# Patient Record
Sex: FEMALE | Race: OTHER | Employment: UNEMPLOYED | URBAN - METROPOLITAN AREA
[De-identification: names, ages, dates, MRNs, and addresses within clinical notes are randomized per-mention and may not be internally consistent; named-entity substitution may affect disease eponyms.]

---

## 2019-01-14 ENCOUNTER — OFFICE VISIT (OUTPATIENT)
Dept: PRIMARY CARE CLINIC | Age: 54
End: 2019-01-14
Payer: COMMERCIAL

## 2019-01-14 VITALS
WEIGHT: 157.2 LBS | OXYGEN SATURATION: 98 % | HEIGHT: 60 IN | DIASTOLIC BLOOD PRESSURE: 82 MMHG | SYSTOLIC BLOOD PRESSURE: 138 MMHG | BODY MASS INDEX: 30.86 KG/M2 | HEART RATE: 61 BPM

## 2019-01-14 DIAGNOSIS — Z13.1 SCREENING FOR DIABETES MELLITUS: ICD-10-CM

## 2019-01-14 DIAGNOSIS — Z00.00 HEALTHCARE MAINTENANCE: Primary | ICD-10-CM

## 2019-01-14 DIAGNOSIS — Z12.31 SCREENING MAMMOGRAM, ENCOUNTER FOR: ICD-10-CM

## 2019-01-14 DIAGNOSIS — Z13.220 SCREENING FOR HYPERLIPIDEMIA: ICD-10-CM

## 2019-01-14 PROCEDURE — 99386 PREV VISIT NEW AGE 40-64: CPT | Performed by: FAMILY MEDICINE

## 2019-01-14 ASSESSMENT — PATIENT HEALTH QUESTIONNAIRE - PHQ9
1. LITTLE INTEREST OR PLEASURE IN DOING THINGS: 0
SUM OF ALL RESPONSES TO PHQ QUESTIONS 1-9: 0
SUM OF ALL RESPONSES TO PHQ9 QUESTIONS 1 & 2: 0
SUM OF ALL RESPONSES TO PHQ QUESTIONS 1-9: 0
2. FEELING DOWN, DEPRESSED OR HOPELESS: 0

## 2019-01-14 ASSESSMENT — ENCOUNTER SYMPTOMS
DIARRHEA: 0
NAUSEA: 0
VOMITING: 0
BLOOD IN STOOL: 0

## 2019-01-24 ENCOUNTER — HOSPITAL ENCOUNTER (OUTPATIENT)
Age: 54
Discharge: HOME | End: 2019-01-24
Payer: COMMERCIAL

## 2019-01-24 DIAGNOSIS — Z00.00 HEALTHCARE MAINTENANCE: ICD-10-CM

## 2019-01-24 DIAGNOSIS — Z13.1 SCREENING FOR DIABETES MELLITUS: ICD-10-CM

## 2019-01-24 DIAGNOSIS — Z13.220 SCREENING FOR HYPERLIPIDEMIA: ICD-10-CM

## 2019-01-24 LAB
ALBUMIN SERPL-MCNC: 4.6 G/DL (ref 3.5–5.2)
ALBUMIN/GLOBULIN RATIO: ABNORMAL (ref 1–2.5)
ALP BLD-CCNC: 73 U/L (ref 35–104)
ALT SERPL-CCNC: 27 U/L (ref 5–33)
ANION GAP SERPL CALCULATED.3IONS-SCNC: 11 MMOL/L (ref 9–17)
AST SERPL-CCNC: 21 U/L
BILIRUB SERPL-MCNC: 1.52 MG/DL (ref 0.3–1.2)
BUN BLDV-MCNC: 9 MG/DL (ref 6–20)
BUN/CREAT BLD: ABNORMAL (ref 9–20)
CALCIUM SERPL-MCNC: 9.9 MG/DL (ref 8.6–10.4)
CHLORIDE BLD-SCNC: 101 MMOL/L (ref 98–107)
CHOLESTEROL/HDL RATIO: 5.4
CHOLESTEROL: 207 MG/DL
CO2: 28 MMOL/L (ref 20–31)
CREAT SERPL-MCNC: 0.57 MG/DL (ref 0.5–0.9)
ESTIMATED AVERAGE GLUCOSE: 123 MG/DL
GFR AFRICAN AMERICAN: >60 ML/MIN
GFR NON-AFRICAN AMERICAN: >60 ML/MIN
GFR SERPL CREATININE-BSD FRML MDRD: ABNORMAL ML/MIN/{1.73_M2}
GFR SERPL CREATININE-BSD FRML MDRD: ABNORMAL ML/MIN/{1.73_M2}
GLUCOSE BLD-MCNC: 109 MG/DL (ref 70–99)
HBA1C MFR BLD: 5.9 % (ref 4–6)
HDLC SERPL-MCNC: 38 MG/DL
LDL CHOLESTEROL: 99 MG/DL (ref 0–130)
POTASSIUM SERPL-SCNC: 4.4 MMOL/L (ref 3.7–5.3)
SODIUM BLD-SCNC: 140 MMOL/L (ref 135–144)
TOTAL PROTEIN: 7.6 G/DL (ref 6.4–8.3)
TRIGL SERPL-MCNC: 349 MG/DL
VLDLC SERPL CALC-MCNC: ABNORMAL MG/DL (ref 1–30)

## 2019-01-24 PROCEDURE — 80053 COMPREHEN METABOLIC PANEL: CPT

## 2019-01-24 PROCEDURE — 80061 LIPID PANEL: CPT

## 2019-01-24 PROCEDURE — 36415 COLL VENOUS BLD VENIPUNCTURE: CPT

## 2019-01-24 PROCEDURE — 83036 HEMOGLOBIN GLYCOSYLATED A1C: CPT

## 2019-02-01 ENCOUNTER — OFFICE VISIT (OUTPATIENT)
Dept: PRIMARY CARE CLINIC | Age: 54
End: 2019-02-01
Payer: COMMERCIAL

## 2019-02-01 ENCOUNTER — HOSPITAL ENCOUNTER (OUTPATIENT)
Age: 54
Setting detail: SPECIMEN
Discharge: HOME | End: 2019-02-01

## 2019-02-01 VITALS
OXYGEN SATURATION: 97 % | WEIGHT: 158.8 LBS | SYSTOLIC BLOOD PRESSURE: 126 MMHG | BODY MASS INDEX: 31.18 KG/M2 | DIASTOLIC BLOOD PRESSURE: 70 MMHG | HEART RATE: 63 BPM | HEIGHT: 60 IN

## 2019-02-01 DIAGNOSIS — G47.00 INSOMNIA, UNSPECIFIED TYPE: ICD-10-CM

## 2019-02-01 DIAGNOSIS — R73.03 PRE-DIABETES: ICD-10-CM

## 2019-02-01 DIAGNOSIS — R17 ELEVATED BILIRUBIN: ICD-10-CM

## 2019-02-01 DIAGNOSIS — E78.1 HYPERTRIGLYCERIDEMIA: ICD-10-CM

## 2019-02-01 DIAGNOSIS — Z12.4 SCREENING FOR CERVICAL CANCER: Primary | ICD-10-CM

## 2019-02-01 PROCEDURE — 99214 OFFICE O/P EST MOD 30 MIN: CPT | Performed by: FAMILY MEDICINE

## 2019-02-01 ASSESSMENT — ENCOUNTER SYMPTOMS
VOMITING: 0
CONSTIPATION: 0
DIARRHEA: 0
SHORTNESS OF BREATH: 0
ABDOMINAL PAIN: 0
NAUSEA: 0

## 2019-02-01 ASSESSMENT — PATIENT HEALTH QUESTIONNAIRE - PHQ9
SUM OF ALL RESPONSES TO PHQ9 QUESTIONS 1 & 2: 0
SUM OF ALL RESPONSES TO PHQ QUESTIONS 1-9: 0
1. LITTLE INTEREST OR PLEASURE IN DOING THINGS: 0
2. FEELING DOWN, DEPRESSED OR HOPELESS: 0
SUM OF ALL RESPONSES TO PHQ QUESTIONS 1-9: 0

## 2019-02-04 LAB
HPV SAMPLE: ABNORMAL
HPV SOURCE: ABNORMAL
HPV, GENOTYPE 16: NOT DETECTED
HPV, GENOTYPE 18: NOT DETECTED
HPV, HIGH RISK OTHER: DETECTED
HPV, INTERPRETATION: ABNORMAL

## 2019-02-06 ENCOUNTER — HOSPITAL ENCOUNTER (OUTPATIENT)
Dept: WOMENS IMAGING | Age: 54
Discharge: HOME | End: 2019-02-08
Payer: COMMERCIAL

## 2019-02-06 DIAGNOSIS — Z12.31 SCREENING MAMMOGRAM, ENCOUNTER FOR: ICD-10-CM

## 2019-02-06 PROCEDURE — 77067 SCR MAMMO BI INCL CAD: CPT

## 2019-02-15 LAB — CYTOLOGY REPORT: NORMAL

## 2019-02-18 DIAGNOSIS — R87.610 ASCUS WITH POSITIVE HIGH RISK HPV CERVICAL: Primary | ICD-10-CM

## 2019-02-18 DIAGNOSIS — R87.810 ASCUS WITH POSITIVE HIGH RISK HPV CERVICAL: Primary | ICD-10-CM

## 2019-03-08 ENCOUNTER — OFFICE VISIT (OUTPATIENT)
Dept: OBGYN CLINIC | Age: 54
End: 2019-03-08
Payer: COMMERCIAL

## 2019-03-08 ENCOUNTER — HOSPITAL ENCOUNTER (OUTPATIENT)
Age: 54
Discharge: HOME | End: 2019-03-08
Payer: COMMERCIAL

## 2019-03-08 VITALS
HEART RATE: 53 BPM | BODY MASS INDEX: 30.66 KG/M2 | WEIGHT: 157 LBS | DIASTOLIC BLOOD PRESSURE: 87 MMHG | SYSTOLIC BLOOD PRESSURE: 141 MMHG

## 2019-03-08 DIAGNOSIS — R87.810 ASCUS WITH POSITIVE HIGH RISK HPV CERVICAL: Primary | ICD-10-CM

## 2019-03-08 DIAGNOSIS — R87.610 ASCUS WITH POSITIVE HIGH RISK HPV CERVICAL: Primary | ICD-10-CM

## 2019-03-08 DIAGNOSIS — R17 ELEVATED BILIRUBIN: ICD-10-CM

## 2019-03-08 LAB
ALBUMIN SERPL-MCNC: 4.8 G/DL (ref 3.5–5.2)
ALBUMIN/GLOBULIN RATIO: ABNORMAL (ref 1–2.5)
ALP BLD-CCNC: 78 U/L (ref 35–104)
ALT SERPL-CCNC: 32 U/L (ref 5–33)
ANION GAP SERPL CALCULATED.3IONS-SCNC: 11 MMOL/L (ref 9–17)
AST SERPL-CCNC: 28 U/L
BILIRUB SERPL-MCNC: 1.75 MG/DL (ref 0.3–1.2)
BILIRUBIN DIRECT: 0.22 MG/DL
BUN BLDV-MCNC: 10 MG/DL (ref 6–20)
BUN/CREAT BLD: ABNORMAL (ref 9–20)
CALCIUM SERPL-MCNC: 9.9 MG/DL (ref 8.6–10.4)
CHLORIDE BLD-SCNC: 101 MMOL/L (ref 98–107)
CO2: 28 MMOL/L (ref 20–31)
CREAT SERPL-MCNC: 0.57 MG/DL (ref 0.5–0.9)
GFR AFRICAN AMERICAN: >60 ML/MIN
GFR NON-AFRICAN AMERICAN: >60 ML/MIN
GFR SERPL CREATININE-BSD FRML MDRD: ABNORMAL ML/MIN/{1.73_M2}
GFR SERPL CREATININE-BSD FRML MDRD: ABNORMAL ML/MIN/{1.73_M2}
GLUCOSE BLD-MCNC: 106 MG/DL (ref 70–99)
POTASSIUM SERPL-SCNC: 4.4 MMOL/L (ref 3.7–5.3)
SODIUM BLD-SCNC: 140 MMOL/L (ref 135–144)
TOTAL PROTEIN: 7.9 G/DL (ref 6.4–8.3)

## 2019-03-08 PROCEDURE — 36415 COLL VENOUS BLD VENIPUNCTURE: CPT

## 2019-03-08 PROCEDURE — 80053 COMPREHEN METABOLIC PANEL: CPT

## 2019-03-08 PROCEDURE — 57452 EXAM OF CERVIX W/SCOPE: CPT | Performed by: SPECIALIST

## 2019-03-08 PROCEDURE — 82248 BILIRUBIN DIRECT: CPT

## 2019-03-08 ASSESSMENT — ENCOUNTER SYMPTOMS
DIARRHEA: 0
EYE PAIN: 0
ABDOMINAL DISTENTION: 0
COUGH: 0
VOMITING: 0
APNEA: 0
NAUSEA: 0
ABDOMINAL PAIN: 0
CONSTIPATION: 0

## 2019-03-11 ENCOUNTER — TELEPHONE (OUTPATIENT)
Dept: PRIMARY CARE CLINIC | Age: 54
End: 2019-03-11

## 2019-03-22 ENCOUNTER — OFFICE VISIT (OUTPATIENT)
Dept: PRIMARY CARE CLINIC | Age: 54
End: 2019-03-22
Payer: COMMERCIAL

## 2019-03-22 VITALS
OXYGEN SATURATION: 97 % | WEIGHT: 156.97 LBS | DIASTOLIC BLOOD PRESSURE: 78 MMHG | SYSTOLIC BLOOD PRESSURE: 128 MMHG | BODY MASS INDEX: 30.82 KG/M2 | HEIGHT: 60 IN | RESPIRATION RATE: 16 BRPM | HEART RATE: 62 BPM

## 2019-03-22 DIAGNOSIS — D64.9 ANEMIA, UNSPECIFIED TYPE: ICD-10-CM

## 2019-03-22 DIAGNOSIS — E80.6 INDIRECT HYPERBILIRUBINEMIA: Primary | ICD-10-CM

## 2019-03-22 DIAGNOSIS — H10.9 CONJUNCTIVITIS OF BOTH EYES, UNSPECIFIED CONJUNCTIVITIS TYPE: ICD-10-CM

## 2019-03-22 PROCEDURE — 3017F COLORECTAL CA SCREEN DOC REV: CPT | Performed by: FAMILY MEDICINE

## 2019-03-22 PROCEDURE — 99213 OFFICE O/P EST LOW 20 MIN: CPT | Performed by: FAMILY MEDICINE

## 2019-03-22 PROCEDURE — 1036F TOBACCO NON-USER: CPT | Performed by: FAMILY MEDICINE

## 2019-03-22 PROCEDURE — G8427 DOCREV CUR MEDS BY ELIG CLIN: HCPCS | Performed by: FAMILY MEDICINE

## 2019-03-22 PROCEDURE — G8417 CALC BMI ABV UP PARAM F/U: HCPCS | Performed by: FAMILY MEDICINE

## 2019-03-22 PROCEDURE — G8484 FLU IMMUNIZE NO ADMIN: HCPCS | Performed by: FAMILY MEDICINE

## 2019-03-22 RX ORDER — POLYMYXIN B SULFATE AND TRIMETHOPRIM 1; 10000 MG/ML; [USP'U]/ML
1 SOLUTION OPHTHALMIC EVERY 6 HOURS
Qty: 1 BOTTLE | Refills: 0 | Status: SHIPPED | OUTPATIENT
Start: 2019-03-22 | End: 2019-03-29

## 2019-03-22 ASSESSMENT — ENCOUNTER SYMPTOMS
EYE ITCHING: 1
SHORTNESS OF BREATH: 0
CONSTIPATION: 0
EYE PAIN: 0
COUGH: 0
DIARRHEA: 0
SORE THROAT: 0
RHINORRHEA: 0
VOMITING: 0
ABDOMINAL PAIN: 0
EYE DISCHARGE: 1
NAUSEA: 0

## 2019-04-04 ENCOUNTER — TELEPHONE (OUTPATIENT)
Dept: PRIMARY CARE CLINIC | Age: 54
End: 2019-04-04

## 2019-04-04 NOTE — TELEPHONE ENCOUNTER
Patient's daughter Annabelle Quiroga ([phone]) called stating that Patient came in to see Dr Lobito Barber on 3/22/19 for dry eyes, and a script for eye drops was sent in. The eye drops did not help, and actually made her eyes worse and says it actually feels like \"sand in her eyes\"    She is hoping something else can be called in for her instead of having to come in again.

## 2019-04-22 ENCOUNTER — TELEPHONE (OUTPATIENT)
Dept: PRIMARY CARE CLINIC | Age: 54
End: 2019-04-22

## 2019-05-17 ENCOUNTER — HOSPITAL ENCOUNTER (OUTPATIENT)
Age: 54
Discharge: HOME | End: 2019-05-17
Payer: COMMERCIAL

## 2019-05-17 DIAGNOSIS — D64.9 ANEMIA, UNSPECIFIED TYPE: ICD-10-CM

## 2019-05-17 DIAGNOSIS — E80.6 INDIRECT HYPERBILIRUBINEMIA: ICD-10-CM

## 2019-05-17 LAB
ABSOLUTE EOS #: 0.2 K/UL (ref 0–0.4)
ABSOLUTE IMMATURE GRANULOCYTE: NORMAL K/UL (ref 0–0.3)
ABSOLUTE LYMPH #: 2 K/UL (ref 1–4.8)
ABSOLUTE MONO #: 0.3 K/UL (ref 0.1–1.3)
ABSOLUTE RETIC #: 0.06 M/UL (ref 0.02–0.1)
BASOPHILS # BLD: 1 % (ref 0–2)
BASOPHILS ABSOLUTE: 0.1 K/UL (ref 0–0.2)
DIFFERENTIAL TYPE: NORMAL
EOSINOPHILS RELATIVE PERCENT: 4 % (ref 0–4)
HAPTOGLOBIN: 134 MG/DL (ref 30–200)
HCT VFR BLD CALC: 41.2 % (ref 36–46)
HEMOGLOBIN: 14 G/DL (ref 12–16)
IMMATURE GRANULOCYTES: NORMAL %
IMMATURE RETIC FRACT: NORMAL %
LACTATE DEHYDROGENASE: 185 U/L (ref 135–214)
LYMPHOCYTES # BLD: 34 % (ref 24–44)
MCH RBC QN AUTO: 28.1 PG (ref 26–34)
MCHC RBC AUTO-ENTMCNC: 34 G/DL (ref 31–37)
MCV RBC AUTO: 82.6 FL (ref 80–100)
MONOCYTES # BLD: 6 % (ref 1–7)
NRBC AUTOMATED: NORMAL PER 100 WBC
PDW BLD-RTO: 13.5 % (ref 11.5–14.9)
PLATELET # BLD: 261 K/UL (ref 150–450)
PLATELET ESTIMATE: NORMAL
PMV BLD AUTO: 7.7 FL (ref 6–12)
RBC # BLD: 4.98 M/UL (ref 4–5.2)
RBC # BLD: NORMAL 10*6/UL
RETIC %: 1.3 % (ref 0.5–2)
RETIC HEMOGLOBIN: NORMAL PG (ref 28.2–35.7)
SEG NEUTROPHILS: 55 % (ref 36–66)
SEGMENTED NEUTROPHILS ABSOLUTE COUNT: 3.4 K/UL (ref 1.3–9.1)
WBC # BLD: 6 K/UL (ref 3.5–11)
WBC # BLD: NORMAL 10*3/UL

## 2019-05-17 PROCEDURE — 85025 COMPLETE CBC W/AUTO DIFF WBC: CPT

## 2019-05-17 PROCEDURE — 85045 AUTOMATED RETICULOCYTE COUNT: CPT

## 2019-05-17 PROCEDURE — 83615 LACTATE (LD) (LDH) ENZYME: CPT

## 2019-05-17 PROCEDURE — 36415 COLL VENOUS BLD VENIPUNCTURE: CPT

## 2019-05-17 PROCEDURE — 83010 ASSAY OF HAPTOGLOBIN QUANT: CPT

## 2019-07-18 ENCOUNTER — TELEPHONE (OUTPATIENT)
Dept: PRIMARY CARE CLINIC | Age: 54
End: 2019-07-18

## 2019-09-20 ENCOUNTER — OFFICE VISIT (OUTPATIENT)
Dept: PRIMARY CARE CLINIC | Age: 54
End: 2019-09-20
Payer: COMMERCIAL

## 2019-09-20 VITALS
DIASTOLIC BLOOD PRESSURE: 84 MMHG | RESPIRATION RATE: 18 BRPM | WEIGHT: 156.97 LBS | BODY MASS INDEX: 30.82 KG/M2 | HEIGHT: 60 IN | SYSTOLIC BLOOD PRESSURE: 120 MMHG

## 2019-09-20 DIAGNOSIS — H04.123 DRY EYES: ICD-10-CM

## 2019-09-20 DIAGNOSIS — E80.6 INDIRECT HYPERBILIRUBINEMIA: ICD-10-CM

## 2019-09-20 DIAGNOSIS — R87.810 ASCUS WITH POSITIVE HIGH RISK HPV CERVICAL: ICD-10-CM

## 2019-09-20 DIAGNOSIS — Z12.11 SCREENING FOR COLON CANCER: ICD-10-CM

## 2019-09-20 DIAGNOSIS — R10.31 RIGHT LOWER QUADRANT ABDOMINAL PAIN: ICD-10-CM

## 2019-09-20 DIAGNOSIS — R87.610 ASCUS WITH POSITIVE HIGH RISK HPV CERVICAL: ICD-10-CM

## 2019-09-20 PROCEDURE — 3017F COLORECTAL CA SCREEN DOC REV: CPT | Performed by: FAMILY MEDICINE

## 2019-09-20 PROCEDURE — G8427 DOCREV CUR MEDS BY ELIG CLIN: HCPCS | Performed by: FAMILY MEDICINE

## 2019-09-20 PROCEDURE — 1036F TOBACCO NON-USER: CPT | Performed by: FAMILY MEDICINE

## 2019-09-20 PROCEDURE — 99214 OFFICE O/P EST MOD 30 MIN: CPT | Performed by: FAMILY MEDICINE

## 2019-09-20 PROCEDURE — G8417 CALC BMI ABV UP PARAM F/U: HCPCS | Performed by: FAMILY MEDICINE

## 2019-09-20 NOTE — PROGRESS NOTES
medications for this visit. No Known Allergies    Health Maintenance   Topic Date Due    Hepatitis C screen  1965    HIV screen  02/12/1980    DTaP/Tdap/Td vaccine (1 - Tdap) 02/12/1984    Shingles Vaccine (1 of 2) 02/12/2015    Colon cancer screen colonoscopy  02/12/2015    Flu vaccine (1) 09/01/2019    A1C test (Diabetic or Prediabetic)  01/24/2020    Breast cancer screen  02/06/2021    Lipid screen  01/24/2024    Cervical cancer screen  02/01/2024    Pneumococcal 0-64 years Vaccine  Aged Out       Subjective:      Review of Systems   Constitutional: Negative for chills and fever. Eyes:        Dry eyes   Respiratory: Negative for shortness of breath. Gastrointestinal: Negative for blood in stool, constipation, diarrhea, nausea and vomiting. Once in a while gets mild RLQ/ pelvic discomfort   Genitourinary: Negative for dysuria and vaginal bleeding. Objective:     Physical Exam   Constitutional: She appears well-developed and well-nourished. No distress. HENT:   Head: Normocephalic and atraumatic. Mouth/Throat: Oropharynx is clear and moist. No oropharyngeal exudate. Eyes: Pupils are equal, round, and reactive to light. Right eye exhibits no discharge. Left eye exhibits no discharge. No scleral icterus. Neck: Neck supple. Cardiovascular: Normal rate, regular rhythm and normal heart sounds. No murmur heard. Pulmonary/Chest: Effort normal and breath sounds normal. No stridor. No respiratory distress. Abdominal: Soft. Bowel sounds are normal. She exhibits no distension. There is no tenderness. Neurological: She is alert. Skin: Skin is warm and dry. She is not diaphoretic. Psychiatric: She has a normal mood and affect. Her behavior is normal.     /84 (Site: Left Upper Arm, Position: Sitting, Cuff Size: Large Adult)   Resp 18   Ht 5' (1.524 m)   Wt 156 lb 15.5 oz (71.2 kg)   LMP 10/13/2015 (Approximate) Comment: possible menopause  Breastfeeding?  No Instructed to continue current medications, diet and exercise. Patient agreed with treatment plan. Follow up as directed.      Electronically signed by Capo Reyes DO on 9/21/2019 at 8:57 PM

## 2019-09-21 ASSESSMENT — ENCOUNTER SYMPTOMS
SHORTNESS OF BREATH: 0
CONSTIPATION: 0
DIARRHEA: 0
VOMITING: 0
BLOOD IN STOOL: 0
NAUSEA: 0

## 2019-09-27 ENCOUNTER — OFFICE VISIT (OUTPATIENT)
Dept: OBGYN CLINIC | Age: 54
End: 2019-09-27
Payer: COMMERCIAL

## 2019-09-27 ENCOUNTER — HOSPITAL ENCOUNTER (OUTPATIENT)
Age: 54
Setting detail: SPECIMEN
Discharge: HOME | End: 2019-09-27
Payer: COMMERCIAL

## 2019-09-27 VITALS
BODY MASS INDEX: 30.43 KG/M2 | WEIGHT: 155 LBS | DIASTOLIC BLOOD PRESSURE: 85 MMHG | SYSTOLIC BLOOD PRESSURE: 145 MMHG | HEART RATE: 54 BPM | HEIGHT: 60 IN

## 2019-09-27 DIAGNOSIS — R87.810 ASCUS WITH POSITIVE HIGH RISK HPV CERVICAL: Primary | ICD-10-CM

## 2019-09-27 DIAGNOSIS — R10.2 PELVIC PAIN IN FEMALE: ICD-10-CM

## 2019-09-27 DIAGNOSIS — R87.610 ASCUS WITH POSITIVE HIGH RISK HPV CERVICAL: Primary | ICD-10-CM

## 2019-09-27 PROCEDURE — 1036F TOBACCO NON-USER: CPT | Performed by: SPECIALIST

## 2019-09-27 PROCEDURE — G8417 CALC BMI ABV UP PARAM F/U: HCPCS | Performed by: SPECIALIST

## 2019-09-27 PROCEDURE — 3017F COLORECTAL CA SCREEN DOC REV: CPT | Performed by: SPECIALIST

## 2019-09-27 PROCEDURE — G8427 DOCREV CUR MEDS BY ELIG CLIN: HCPCS | Performed by: SPECIALIST

## 2019-09-27 PROCEDURE — 99213 OFFICE O/P EST LOW 20 MIN: CPT | Performed by: SPECIALIST

## 2019-09-27 ASSESSMENT — ENCOUNTER SYMPTOMS
CONSTIPATION: 0
DIARRHEA: 0
EYE PAIN: 0
VOMITING: 0
NAUSEA: 0
APNEA: 0
ABDOMINAL DISTENTION: 0
COUGH: 0
ABDOMINAL PAIN: 0

## 2019-10-08 LAB — CYTOLOGY REPORT: NORMAL

## 2019-10-10 LAB
HPV SAMPLE: ABNORMAL
HPV, GENOTYPE 16: NOT DETECTED
HPV, GENOTYPE 18: NOT DETECTED
HPV, HIGH RISK OTHER: DETECTED
HPV, INTERPRETATION: ABNORMAL
SPECIMEN DESCRIPTION: ABNORMAL

## 2019-10-21 ENCOUNTER — OFFICE VISIT (OUTPATIENT)
Dept: OBGYN CLINIC | Age: 54
End: 2019-10-21
Payer: COMMERCIAL

## 2019-10-21 VITALS
BODY MASS INDEX: 30.63 KG/M2 | SYSTOLIC BLOOD PRESSURE: 126 MMHG | HEIGHT: 60 IN | HEART RATE: 60 BPM | WEIGHT: 156 LBS | DIASTOLIC BLOOD PRESSURE: 75 MMHG

## 2019-10-21 DIAGNOSIS — D21.9 FIBROID: Primary | ICD-10-CM

## 2019-10-21 DIAGNOSIS — R87.810 ASCUS WITH POSITIVE HIGH RISK HPV CERVICAL: ICD-10-CM

## 2019-10-21 DIAGNOSIS — R87.610 ASCUS WITH POSITIVE HIGH RISK HPV CERVICAL: ICD-10-CM

## 2019-10-21 PROCEDURE — 3017F COLORECTAL CA SCREEN DOC REV: CPT | Performed by: SPECIALIST

## 2019-10-21 PROCEDURE — G8427 DOCREV CUR MEDS BY ELIG CLIN: HCPCS | Performed by: SPECIALIST

## 2019-10-21 PROCEDURE — G8417 CALC BMI ABV UP PARAM F/U: HCPCS | Performed by: SPECIALIST

## 2019-10-21 PROCEDURE — G8484 FLU IMMUNIZE NO ADMIN: HCPCS | Performed by: SPECIALIST

## 2019-10-21 PROCEDURE — 1036F TOBACCO NON-USER: CPT | Performed by: SPECIALIST

## 2019-10-21 PROCEDURE — 99213 OFFICE O/P EST LOW 20 MIN: CPT | Performed by: SPECIALIST

## 2019-10-21 ASSESSMENT — ENCOUNTER SYMPTOMS
ABDOMINAL PAIN: 0
COUGH: 0
NAUSEA: 0
EYE PAIN: 0
APNEA: 0
DIARRHEA: 0
CONSTIPATION: 0
ABDOMINAL DISTENTION: 0
VOMITING: 0

## 2020-01-27 ENCOUNTER — TELEPHONE (OUTPATIENT)
Dept: PRIMARY CARE CLINIC | Age: 55
End: 2020-01-27

## 2020-05-08 ENCOUNTER — OFFICE VISIT (OUTPATIENT)
Dept: OBGYN CLINIC | Age: 55
End: 2020-05-08
Payer: COMMERCIAL

## 2020-05-08 ENCOUNTER — HOSPITAL ENCOUNTER (OUTPATIENT)
Age: 55
Setting detail: SPECIMEN
Discharge: HOME | End: 2020-05-08
Payer: COMMERCIAL

## 2020-05-08 VITALS
HEART RATE: 58 BPM | SYSTOLIC BLOOD PRESSURE: 150 MMHG | WEIGHT: 157 LBS | DIASTOLIC BLOOD PRESSURE: 94 MMHG | BODY MASS INDEX: 30.66 KG/M2 | TEMPERATURE: 98.3 F

## 2020-05-08 PROBLEM — R87.610 ASCUS WITH POSITIVE HIGH RISK HPV CERVICAL: Status: ACTIVE | Noted: 2020-05-08

## 2020-05-08 PROBLEM — R87.810 ASCUS WITH POSITIVE HIGH RISK HPV CERVICAL: Status: ACTIVE | Noted: 2020-05-08

## 2020-05-08 PROCEDURE — 3017F COLORECTAL CA SCREEN DOC REV: CPT | Performed by: SPECIALIST

## 2020-05-08 PROCEDURE — G8417 CALC BMI ABV UP PARAM F/U: HCPCS | Performed by: SPECIALIST

## 2020-05-08 PROCEDURE — 99213 OFFICE O/P EST LOW 20 MIN: CPT | Performed by: SPECIALIST

## 2020-05-08 PROCEDURE — 1036F TOBACCO NON-USER: CPT | Performed by: SPECIALIST

## 2020-05-08 PROCEDURE — G8427 DOCREV CUR MEDS BY ELIG CLIN: HCPCS | Performed by: SPECIALIST

## 2020-05-08 ASSESSMENT — ENCOUNTER SYMPTOMS
ABDOMINAL PAIN: 0
APNEA: 0
VOMITING: 0
COUGH: 0
NAUSEA: 0
ABDOMINAL DISTENTION: 0
CONSTIPATION: 0
DIARRHEA: 0
EYE PAIN: 0

## 2020-05-12 LAB — CYTOLOGY REPORT: NORMAL

## 2023-03-27 ENCOUNTER — HOSPITAL ENCOUNTER (OUTPATIENT)
Age: 58
Setting detail: SPECIMEN
Discharge: HOME | End: 2023-03-27

## 2023-03-27 ENCOUNTER — OFFICE VISIT (OUTPATIENT)
Dept: OBGYN CLINIC | Age: 58
End: 2023-03-27
Payer: COMMERCIAL

## 2023-03-27 VITALS
DIASTOLIC BLOOD PRESSURE: 72 MMHG | WEIGHT: 158 LBS | HEIGHT: 60 IN | BODY MASS INDEX: 31.02 KG/M2 | HEART RATE: 97 BPM | SYSTOLIC BLOOD PRESSURE: 116 MMHG

## 2023-03-27 DIAGNOSIS — Z11.51 SCREENING FOR HPV (HUMAN PAPILLOMAVIRUS): ICD-10-CM

## 2023-03-27 DIAGNOSIS — Z01.419 WELL WOMAN EXAM: Primary | ICD-10-CM

## 2023-03-27 DIAGNOSIS — G47.9 SLEEP DISTURBANCE: ICD-10-CM

## 2023-03-27 DIAGNOSIS — Z12.31 SCREENING MAMMOGRAM FOR BREAST CANCER: ICD-10-CM

## 2023-03-27 PROCEDURE — 1036F TOBACCO NON-USER: CPT | Performed by: SPECIALIST

## 2023-03-27 PROCEDURE — G8427 DOCREV CUR MEDS BY ELIG CLIN: HCPCS | Performed by: SPECIALIST

## 2023-03-27 PROCEDURE — G8484 FLU IMMUNIZE NO ADMIN: HCPCS | Performed by: SPECIALIST

## 2023-03-27 PROCEDURE — 99396 PREV VISIT EST AGE 40-64: CPT | Performed by: SPECIALIST

## 2023-03-27 PROCEDURE — 3017F COLORECTAL CA SCREEN DOC REV: CPT | Performed by: SPECIALIST

## 2023-03-27 PROCEDURE — G8417 CALC BMI ABV UP PARAM F/U: HCPCS | Performed by: SPECIALIST

## 2023-03-27 SDOH — ECONOMIC STABILITY: INCOME INSECURITY: HOW HARD IS IT FOR YOU TO PAY FOR THE VERY BASICS LIKE FOOD, HOUSING, MEDICAL CARE, AND HEATING?: NOT HARD AT ALL

## 2023-03-27 SDOH — ECONOMIC STABILITY: FOOD INSECURITY: WITHIN THE PAST 12 MONTHS, THE FOOD YOU BOUGHT JUST DIDN'T LAST AND YOU DIDN'T HAVE MONEY TO GET MORE.: NEVER TRUE

## 2023-03-27 SDOH — ECONOMIC STABILITY: FOOD INSECURITY: WITHIN THE PAST 12 MONTHS, YOU WORRIED THAT YOUR FOOD WOULD RUN OUT BEFORE YOU GOT MONEY TO BUY MORE.: NEVER TRUE

## 2023-03-27 SDOH — ECONOMIC STABILITY: HOUSING INSECURITY
IN THE LAST 12 MONTHS, WAS THERE A TIME WHEN YOU DID NOT HAVE A STEADY PLACE TO SLEEP OR SLEPT IN A SHELTER (INCLUDING NOW)?: NO

## 2023-03-27 ASSESSMENT — ENCOUNTER SYMPTOMS
NAUSEA: 0
COUGH: 0
CONSTIPATION: 0
ABDOMINAL DISTENTION: 0
APNEA: 0
ABDOMINAL PAIN: 0
EYE PAIN: 0
DIARRHEA: 0
VOMITING: 0

## 2023-03-27 ASSESSMENT — PATIENT HEALTH QUESTIONNAIRE - PHQ9
2. FEELING DOWN, DEPRESSED OR HOPELESS: 0
SUM OF ALL RESPONSES TO PHQ QUESTIONS 1-9: 0
SUM OF ALL RESPONSES TO PHQ9 QUESTIONS 1 & 2: 0
SUM OF ALL RESPONSES TO PHQ QUESTIONS 1-9: 0
1. LITTLE INTEREST OR PLEASURE IN DOING THINGS: 0

## 2023-03-27 NOTE — PROGRESS NOTES
discharge. Uterus: Normal. Not enlarged, not fixed and not tender. Adnexa: Right adnexa normal and left adnexa normal.        Right: No mass or tenderness. Left: No mass or tenderness. Rectum: Normal. No mass or anal fissure. Normal anal tone. Musculoskeletal:         General: No tenderness. Normal range of motion. Skin:     General: Skin is warm and dry. Neurological:      Mental Status: She is alert and oriented to person, place, and time. Psychiatric:         Judgment: Judgment normal.       Assessment:      Patient with normal annual exam.  Pap smear was done. Patient was told to have mammogram done. Patient was advised to use OTC Melatonin or Unison for sleep disturbance. Plan:      Orders Placed This Encounter   Procedures    [facility] DIGITAL SCREEN W OR WO CAD BILATERAL    PAP SMEAR       Appointment in 1 year    IShiv am scribing for, and in the presence of Dr. Paolo Green. Electronically signed by: Shiv Tapia 3/27/23 11:20 AM       I agree to the above documentation placed by my scribe Shiv Tapia. I reviewed the scribe's note and agree with the documented findings and plan of care. Any areas of disagreement are noted on the chart. I have personally evaluated this patient. Additional findings are as noted. I agree with the chief complaint, past medical history, past surgical history, allergies, medications, social and family history as documented unless otherwise noted below.      Electronically signed by Paolo Green MD on 3/28/2023 at 11:23 AM

## 2023-03-29 LAB
HPV SAMPLE: NORMAL
HPV, GENOTYPE 16: NOT DETECTED
HPV, GENOTYPE 18: NOT DETECTED
HPV, HIGH RISK OTHER: NOT DETECTED
HPV, INTERPRETATION: NORMAL
SPECIMEN DESCRIPTION: NORMAL

## 2023-04-03 LAB — CYTOLOGY REPORT: NORMAL

## 2023-04-24 ENCOUNTER — HOSPITAL ENCOUNTER (OUTPATIENT)
Dept: WOMENS IMAGING | Age: 58
Discharge: HOME | End: 2023-04-26
Payer: COMMERCIAL

## 2023-04-24 DIAGNOSIS — Z12.31 SCREENING MAMMOGRAM FOR BREAST CANCER: ICD-10-CM

## 2023-04-24 PROCEDURE — 77063 BREAST TOMOSYNTHESIS BI: CPT

## 2024-10-15 NOTE — PROGRESS NOTES
documentation placed by my scribe Lucy Lui. I reviewed the scribe's note and agree with the documented findings and plan of care. Any areas of disagreement are noted on the chart. I have personally evaluated this patient. Additional findings are as noted. I agree with the chief complaint, past medical history, past surgical history, allergies, medications, social and family history as documented unless otherwise noted below.      Electronically signed by Bia Kahn MD on 5/9/2020 at 5:43 PM 24

## 2024-12-16 ENCOUNTER — APPOINTMENT (OUTPATIENT)
Dept: CT IMAGING | Age: 59
End: 2024-12-16
Payer: COMMERCIAL

## 2024-12-16 ENCOUNTER — APPOINTMENT (OUTPATIENT)
Dept: GENERAL RADIOLOGY | Age: 59
End: 2024-12-16
Payer: COMMERCIAL

## 2024-12-16 ENCOUNTER — HOSPITAL ENCOUNTER (EMERGENCY)
Age: 59
Discharge: HOME | End: 2024-12-16
Attending: EMERGENCY MEDICINE
Payer: COMMERCIAL

## 2024-12-16 VITALS
SYSTOLIC BLOOD PRESSURE: 151 MMHG | OXYGEN SATURATION: 97 % | DIASTOLIC BLOOD PRESSURE: 83 MMHG | TEMPERATURE: 98 F | BODY MASS INDEX: 31.41 KG/M2 | HEART RATE: 54 BPM | WEIGHT: 160 LBS | RESPIRATION RATE: 15 BRPM | HEIGHT: 60 IN

## 2024-12-16 DIAGNOSIS — M54.6 ACUTE LEFT-SIDED THORACIC BACK PAIN: Primary | ICD-10-CM

## 2024-12-16 LAB
ANION GAP SERPL CALCULATED.3IONS-SCNC: 10 MMOL/L (ref 9–16)
BASOPHILS # BLD: 0.1 K/UL (ref 0–0.2)
BASOPHILS NFR BLD: 1 % (ref 0–2)
BUN SERPL-MCNC: 18 MG/DL (ref 6–20)
CALCIUM SERPL-MCNC: 9.1 MG/DL (ref 8.6–10.4)
CHLORIDE SERPL-SCNC: 103 MMOL/L (ref 98–107)
CO2 SERPL-SCNC: 25 MMOL/L (ref 20–31)
CREAT SERPL-MCNC: 0.7 MG/DL (ref 0.7–1.2)
D DIMER PPP FEU-MCNC: 0.73 UG/ML FEU (ref 0–0.59)
EKG ATRIAL RATE: 54 BPM
EKG ATRIAL RATE: 55 BPM
EKG P AXIS: 49 DEGREES
EKG P AXIS: 70 DEGREES
EKG P-R INTERVAL: 140 MS
EKG P-R INTERVAL: 144 MS
EKG Q-T INTERVAL: 426 MS
EKG Q-T INTERVAL: 438 MS
EKG QRS DURATION: 76 MS
EKG QRS DURATION: 82 MS
EKG QTC CALCULATION (BAZETT): 407 MS
EKG QTC CALCULATION (BAZETT): 415 MS
EKG R AXIS: -33 DEGREES
EKG R AXIS: -41 DEGREES
EKG T AXIS: 29 DEGREES
EKG T AXIS: 53 DEGREES
EKG VENTRICULAR RATE: 54 BPM
EKG VENTRICULAR RATE: 55 BPM
EOSINOPHIL # BLD: 0.7 K/UL (ref 0–0.4)
EOSINOPHILS RELATIVE PERCENT: 10 % (ref 0–4)
ERYTHROCYTE [DISTWIDTH] IN BLOOD BY AUTOMATED COUNT: 14.5 % (ref 11.5–14.9)
GFR, ESTIMATED: >90 ML/MIN/1.73M2
GLUCOSE SERPL-MCNC: 106 MG/DL (ref 74–99)
HCT VFR BLD AUTO: 39.4 % (ref 36–46)
HGB BLD-MCNC: 13.1 G/DL (ref 12–16)
INR PPP: 1
LYMPHOCYTES NFR BLD: 1.3 K/UL (ref 1–4.8)
LYMPHOCYTES RELATIVE PERCENT: 17 % (ref 24–44)
MCH RBC QN AUTO: 27.6 PG (ref 26–34)
MCHC RBC AUTO-ENTMCNC: 33.2 G/DL (ref 31–37)
MCV RBC AUTO: 83.1 FL (ref 80–100)
MONOCYTES NFR BLD: 0.6 K/UL (ref 0.1–1.3)
MONOCYTES NFR BLD: 8 % (ref 1–7)
MYOGLOBIN SERPL-MCNC: 112 NG/ML (ref 25–58)
MYOGLOBIN SERPL-MCNC: 118 NG/ML (ref 25–58)
NEUTROPHILS NFR BLD: 64 % (ref 36–66)
NEUTS SEG NFR BLD: 4.8 K/UL (ref 1.3–9.1)
PARTIAL THROMBOPLASTIN TIME: 36 SEC (ref 24–36)
PLATELET # BLD AUTO: 175 K/UL (ref 150–450)
PMV BLD AUTO: 7.6 FL (ref 6–12)
POTASSIUM SERPL-SCNC: 3.9 MMOL/L (ref 3.7–5.3)
PROTHROMBIN TIME: 13.8 SEC (ref 11.8–14.6)
RBC # BLD AUTO: 4.74 M/UL (ref 4–5.2)
SODIUM SERPL-SCNC: 138 MMOL/L (ref 136–145)
T4 FREE SERPL-MCNC: 1.1 NG/DL (ref 0.9–1.7)
TROPONIN I SERPL HS-MCNC: 17 NG/L (ref 0–14)
TROPONIN I SERPL HS-MCNC: 20 NG/L (ref 0–14)
TSH SERPL DL<=0.05 MIU/L-ACNC: 3.38 UIU/ML (ref 0.27–4.2)
WBC OTHER # BLD: 7.4 K/UL (ref 3.5–11)

## 2024-12-16 PROCEDURE — 6360000004 HC RX CONTRAST MEDICATION: Performed by: EMERGENCY MEDICINE

## 2024-12-16 PROCEDURE — 6360000002 HC RX W HCPCS: Performed by: EMERGENCY MEDICINE

## 2024-12-16 PROCEDURE — 93010 ELECTROCARDIOGRAM REPORT: CPT | Performed by: INTERNAL MEDICINE

## 2024-12-16 PROCEDURE — 85025 COMPLETE CBC W/AUTO DIFF WBC: CPT

## 2024-12-16 PROCEDURE — 71260 CT THORAX DX C+: CPT

## 2024-12-16 PROCEDURE — 2580000003 HC RX 258: Performed by: EMERGENCY MEDICINE

## 2024-12-16 PROCEDURE — 84484 ASSAY OF TROPONIN QUANT: CPT

## 2024-12-16 PROCEDURE — 80048 BASIC METABOLIC PNL TOTAL CA: CPT

## 2024-12-16 PROCEDURE — 71045 X-RAY EXAM CHEST 1 VIEW: CPT

## 2024-12-16 PROCEDURE — 85730 THROMBOPLASTIN TIME PARTIAL: CPT

## 2024-12-16 PROCEDURE — 84439 ASSAY OF FREE THYROXINE: CPT

## 2024-12-16 PROCEDURE — 84443 ASSAY THYROID STIM HORMONE: CPT

## 2024-12-16 PROCEDURE — 85610 PROTHROMBIN TIME: CPT

## 2024-12-16 PROCEDURE — 36415 COLL VENOUS BLD VENIPUNCTURE: CPT

## 2024-12-16 PROCEDURE — 85379 FIBRIN DEGRADATION QUANT: CPT

## 2024-12-16 PROCEDURE — 99285 EMERGENCY DEPT VISIT HI MDM: CPT

## 2024-12-16 PROCEDURE — 6370000000 HC RX 637 (ALT 250 FOR IP): Performed by: EMERGENCY MEDICINE

## 2024-12-16 PROCEDURE — 83874 ASSAY OF MYOGLOBIN: CPT

## 2024-12-16 PROCEDURE — 96374 THER/PROPH/DIAG INJ IV PUSH: CPT

## 2024-12-16 PROCEDURE — 93005 ELECTROCARDIOGRAM TRACING: CPT | Performed by: EMERGENCY MEDICINE

## 2024-12-16 RX ORDER — ASPIRIN 81 MG/1
324 TABLET, CHEWABLE ORAL ONCE
Status: COMPLETED | OUTPATIENT
Start: 2024-12-16 | End: 2024-12-16

## 2024-12-16 RX ORDER — CYCLOBENZAPRINE HCL 10 MG
10 TABLET ORAL 3 TIMES DAILY PRN
Qty: 20 TABLET | Refills: 0 | Status: ON HOLD | OUTPATIENT
Start: 2024-12-16 | End: 2024-12-23

## 2024-12-16 RX ORDER — NAPROXEN 500 MG/1
500 TABLET ORAL 2 TIMES DAILY PRN
Qty: 20 TABLET | Refills: 0 | Status: ON HOLD | OUTPATIENT
Start: 2024-12-16 | End: 2024-12-21

## 2024-12-16 RX ORDER — SODIUM CHLORIDE 0.9 % (FLUSH) 0.9 %
10 SYRINGE (ML) INJECTION PRN
Status: DISCONTINUED | OUTPATIENT
Start: 2024-12-16 | End: 2024-12-16 | Stop reason: HOSPADM

## 2024-12-16 RX ORDER — LIDOCAINE 50 MG/G
1 PATCH TOPICAL EVERY 24 HOURS
Qty: 30 PATCH | Refills: 0 | Status: ON HOLD | OUTPATIENT
Start: 2024-12-16 | End: 2025-01-15

## 2024-12-16 RX ORDER — IOPAMIDOL 755 MG/ML
75 INJECTION, SOLUTION INTRAVASCULAR
Status: COMPLETED | OUTPATIENT
Start: 2024-12-16 | End: 2024-12-16

## 2024-12-16 RX ORDER — ORPHENADRINE CITRATE 30 MG/ML
60 INJECTION INTRAMUSCULAR; INTRAVENOUS ONCE
Status: COMPLETED | OUTPATIENT
Start: 2024-12-16 | End: 2024-12-16

## 2024-12-16 RX ORDER — 0.9 % SODIUM CHLORIDE 0.9 %
100 INTRAVENOUS SOLUTION INTRAVENOUS ONCE
Status: COMPLETED | OUTPATIENT
Start: 2024-12-16 | End: 2024-12-16

## 2024-12-16 RX ADMIN — IOPAMIDOL 75 ML: 755 INJECTION, SOLUTION INTRAVENOUS at 03:39

## 2024-12-16 RX ADMIN — SODIUM CHLORIDE 100 ML: 9 INJECTION, SOLUTION INTRAVENOUS at 03:40

## 2024-12-16 RX ADMIN — ASPIRIN 324 MG: 81 TABLET, CHEWABLE ORAL at 02:36

## 2024-12-16 RX ADMIN — ORPHENADRINE CITRATE 60 MG: 60 INJECTION INTRAMUSCULAR; INTRAVENOUS at 02:36

## 2024-12-16 RX ADMIN — SODIUM CHLORIDE, PRESERVATIVE FREE 10 ML: 5 INJECTION INTRAVENOUS at 03:40

## 2024-12-16 ASSESSMENT — PAIN SCALES - GENERAL
PAINLEVEL_OUTOF10: 4
PAINLEVEL_OUTOF10: 7

## 2024-12-16 ASSESSMENT — ENCOUNTER SYMPTOMS
EYE PAIN: 0
WHEEZING: 0
ABDOMINAL PAIN: 0
SINUS PRESSURE: 0
RHINORRHEA: 0
BLOOD IN STOOL: 0
EYE DISCHARGE: 0
CHEST TIGHTNESS: 0
EYE REDNESS: 0
VOMITING: 0
TROUBLE SWALLOWING: 0
CONSTIPATION: 0
NAUSEA: 0
COLOR CHANGE: 0
SHORTNESS OF BREATH: 0
DIARRHEA: 0
SORE THROAT: 0
FACIAL SWELLING: 0
COUGH: 0
BACK PAIN: 1

## 2024-12-16 ASSESSMENT — PAIN DESCRIPTION - LOCATION
LOCATION: ABDOMEN
LOCATION: ABDOMEN

## 2024-12-16 ASSESSMENT — PAIN DESCRIPTION - ORIENTATION
ORIENTATION: MID
ORIENTATION: MID

## 2024-12-16 ASSESSMENT — LIFESTYLE VARIABLES
HOW MANY STANDARD DRINKS CONTAINING ALCOHOL DO YOU HAVE ON A TYPICAL DAY: PATIENT DOES NOT DRINK
HOW OFTEN DO YOU HAVE A DRINK CONTAINING ALCOHOL: NEVER

## 2024-12-16 ASSESSMENT — PAIN DESCRIPTION - DESCRIPTORS: DESCRIPTORS: STABBING

## 2024-12-16 ASSESSMENT — PAIN - FUNCTIONAL ASSESSMENT: PAIN_FUNCTIONAL_ASSESSMENT: 0-10

## 2024-12-16 NOTE — ED NOTES
Mode of arrival (squad #, walk in, police, etc) : Walk-in        Chief complaint(s): CP, Back pain, joint pain        Arrival Note (brief scenario, treatment PTA, etc).: Pt arrived to the ED with above complaints x3 days. Pt states that the pain startes in her back and will radiate to chest and down arms in the joints.         C= \"Have you ever felt that you should Cut down on your drinking?\"  No  A= \"Have people Annoyed you by criticizing your drinking?\"  No  G= \"Have you ever felt bad or Guilty about your drinking?\"  No  E= \"Have you ever had a drink as an Eye-opener first thing in the morning to steady your nerves or to help a hangover?\"  No      Deferred []      Reason for deferring: N/A    *If yes to two or more: probable alcohol abuse.*

## 2024-12-16 NOTE — ED PROVIDER NOTES
eMERGENCY dEPARTMENT eNCOUnter      Pt Name: Suzan Garcia  MRN: [id number]  Birthdate 1965  Date of evaluation: 12/16/24      CHIEF COMPLAINT       Chief Complaint   Patient presents with    Back Pain    Chest Pain    Joint Pain         HISTORY OF PRESENT ILLNESS    Suzan Garcia is a 59 y.o. female who presents complaining of back pain.  Patient is a Niuean-speaking patient that we used a certified  for.  Patient states for the last 3 days she has been having pain mostly in her left upper back going down to the middle part of her back and around into her chest.  Patient states it hurts when she takes of breath but no shortness of breath or cough.  Patient states she gets tingling in both arms.  Patient denies any injuries.  Patient states she also gets pain going into her head a little bit.  Patient states she had something like this about 10 years ago but never got any kind diagnosis for it.      REVIEW OF SYSTEMS       Review of Systems   Constitutional:  Negative for activity change, appetite change, chills, diaphoresis and fever.   HENT:  Negative for congestion, ear pain, facial swelling, nosebleeds, rhinorrhea, sinus pressure, sore throat and trouble swallowing.    Eyes:  Negative for pain, discharge and redness.   Respiratory:  Negative for cough, chest tightness, shortness of breath and wheezing.    Cardiovascular:  Positive for chest pain. Negative for palpitations and leg swelling.   Gastrointestinal:  Negative for abdominal pain, blood in stool, constipation, diarrhea, nausea and vomiting.   Genitourinary:  Negative for difficulty urinating, dysuria, flank pain, frequency, genital sores and hematuria.   Musculoskeletal:  Positive for back pain. Negative for arthralgias, gait problem, joint swelling, myalgias and neck pain.   Skin:  Negative for color change, pallor, rash and wound.   Neurological:  Negative for dizziness, tremors, seizures, syncope, speech difficulty,

## 2024-12-21 ENCOUNTER — HOSPITAL ENCOUNTER (INPATIENT)
Age: 59
LOS: 3 days | Discharge: HOME | DRG: 804 | End: 2024-12-24
Attending: STUDENT IN AN ORGANIZED HEALTH CARE EDUCATION/TRAINING PROGRAM
Payer: COMMERCIAL

## 2024-12-21 ENCOUNTER — APPOINTMENT (OUTPATIENT)
Dept: CT IMAGING | Age: 59
DRG: 804 | End: 2024-12-21
Payer: COMMERCIAL

## 2024-12-21 DIAGNOSIS — K80.50 BILIARY COLIC: Primary | ICD-10-CM

## 2024-12-21 DIAGNOSIS — K80.20 GALLSTONES: ICD-10-CM

## 2024-12-21 LAB
ALBUMIN SERPL-MCNC: 3.9 G/DL (ref 3.5–5.2)
ALP SERPL-CCNC: 124 U/L (ref 35–104)
ALT SERPL-CCNC: 29 U/L (ref 10–35)
ANION GAP SERPL CALCULATED.3IONS-SCNC: 10 MMOL/L (ref 9–16)
AST SERPL-CCNC: 21 U/L (ref 10–35)
BACTERIA URNS QL MICRO: ABNORMAL
BASOPHILS # BLD: 0.1 K/UL (ref 0–0.2)
BASOPHILS NFR BLD: 1 % (ref 0–2)
BILIRUB DIRECT SERPL-MCNC: 0.5 MG/DL (ref 0–0.3)
BILIRUB INDIRECT SERPL-MCNC: 0.8 MG/DL (ref 0–1)
BILIRUB SERPL-MCNC: 1.3 MG/DL (ref 0–1.2)
BILIRUB UR QL STRIP: NEGATIVE
BUN SERPL-MCNC: 9 MG/DL (ref 6–20)
CALCIUM SERPL-MCNC: 9.3 MG/DL (ref 8.6–10.4)
CHLORIDE SERPL-SCNC: 98 MMOL/L (ref 98–107)
CLARITY UR: CLEAR
CO2 SERPL-SCNC: 26 MMOL/L (ref 20–31)
COLOR UR: YELLOW
CREAT SERPL-MCNC: 0.6 MG/DL (ref 0.7–1.2)
EOSINOPHIL # BLD: 1 K/UL (ref 0–0.4)
EOSINOPHILS RELATIVE PERCENT: 12 % (ref 0–4)
EPI CELLS #/AREA URNS HPF: ABNORMAL /HPF
ERYTHROCYTE [DISTWIDTH] IN BLOOD BY AUTOMATED COUNT: 14.5 % (ref 11.5–14.9)
GFR, ESTIMATED: >90 ML/MIN/1.73M2
GLUCOSE SERPL-MCNC: 133 MG/DL (ref 74–99)
GLUCOSE UR STRIP-MCNC: NEGATIVE MG/DL
HCT VFR BLD AUTO: 39.7 % (ref 36–46)
HGB BLD-MCNC: 13.3 G/DL (ref 12–16)
HGB UR QL STRIP.AUTO: NEGATIVE
KETONES UR STRIP-MCNC: NEGATIVE MG/DL
LEUKOCYTE ESTERASE UR QL STRIP: ABNORMAL
LIPASE SERPL-CCNC: 32 U/L (ref 13–60)
LYMPHOCYTES NFR BLD: 1.1 K/UL (ref 1–4.8)
LYMPHOCYTES RELATIVE PERCENT: 12 % (ref 24–44)
MAGNESIUM SERPL-MCNC: 2.2 MG/DL (ref 1.6–2.6)
MCH RBC QN AUTO: 27.6 PG (ref 26–34)
MCHC RBC AUTO-ENTMCNC: 33.5 G/DL (ref 31–37)
MCV RBC AUTO: 82.5 FL (ref 80–100)
MONOCYTES NFR BLD: 0.6 K/UL (ref 0.1–1.3)
MONOCYTES NFR BLD: 7 % (ref 1–7)
NEUTROPHILS NFR BLD: 68 % (ref 36–66)
NEUTS SEG NFR BLD: 6 K/UL (ref 1.3–9.1)
NITRITE UR QL STRIP: NEGATIVE
PH UR STRIP: 6.5 [PH] (ref 5–8)
PLATELET # BLD AUTO: 136 K/UL (ref 150–450)
PMV BLD AUTO: 7.9 FL (ref 6–12)
POTASSIUM SERPL-SCNC: 3.8 MMOL/L (ref 3.7–5.3)
PROT SERPL-MCNC: 8.3 G/DL (ref 6.6–8.7)
PROT UR STRIP-MCNC: NEGATIVE MG/DL
RBC # BLD AUTO: 4.81 M/UL (ref 4–5.2)
RBC #/AREA URNS HPF: ABNORMAL /HPF
SODIUM SERPL-SCNC: 134 MMOL/L (ref 136–145)
SP GR UR STRIP: 1.05 (ref 1–1.03)
TROPONIN I SERPL HS-MCNC: 26 NG/L (ref 0–14)
UROBILINOGEN UR STRIP-ACNC: NORMAL EU/DL (ref 0–1)
WBC #/AREA URNS HPF: ABNORMAL /HPF
WBC OTHER # BLD: 8.7 K/UL (ref 3.5–11)

## 2024-12-21 PROCEDURE — 84484 ASSAY OF TROPONIN QUANT: CPT

## 2024-12-21 PROCEDURE — 1200000000 HC SEMI PRIVATE

## 2024-12-21 PROCEDURE — 2580000003 HC RX 258: Performed by: STUDENT IN AN ORGANIZED HEALTH CARE EDUCATION/TRAINING PROGRAM

## 2024-12-21 PROCEDURE — 80048 BASIC METABOLIC PNL TOTAL CA: CPT

## 2024-12-21 PROCEDURE — 85025 COMPLETE CBC W/AUTO DIFF WBC: CPT

## 2024-12-21 PROCEDURE — 2500000003 HC RX 250 WO HCPCS: Performed by: STUDENT IN AN ORGANIZED HEALTH CARE EDUCATION/TRAINING PROGRAM

## 2024-12-21 PROCEDURE — 83735 ASSAY OF MAGNESIUM: CPT

## 2024-12-21 PROCEDURE — 6360000002 HC RX W HCPCS

## 2024-12-21 PROCEDURE — 74177 CT ABD & PELVIS W/CONTRAST: CPT

## 2024-12-21 PROCEDURE — 6360000002 HC RX W HCPCS: Performed by: STUDENT IN AN ORGANIZED HEALTH CARE EDUCATION/TRAINING PROGRAM

## 2024-12-21 PROCEDURE — 6360000004 HC RX CONTRAST MEDICATION: Performed by: STUDENT IN AN ORGANIZED HEALTH CARE EDUCATION/TRAINING PROGRAM

## 2024-12-21 PROCEDURE — 93005 ELECTROCARDIOGRAM TRACING: CPT | Performed by: STUDENT IN AN ORGANIZED HEALTH CARE EDUCATION/TRAINING PROGRAM

## 2024-12-21 PROCEDURE — 81001 URINALYSIS AUTO W/SCOPE: CPT

## 2024-12-21 PROCEDURE — 99285 EMERGENCY DEPT VISIT HI MDM: CPT

## 2024-12-21 PROCEDURE — 96374 THER/PROPH/DIAG INJ IV PUSH: CPT

## 2024-12-21 PROCEDURE — 96375 TX/PRO/DX INJ NEW DRUG ADDON: CPT

## 2024-12-21 PROCEDURE — 83690 ASSAY OF LIPASE: CPT

## 2024-12-21 PROCEDURE — 36415 COLL VENOUS BLD VENIPUNCTURE: CPT

## 2024-12-21 PROCEDURE — 2580000003 HC RX 258

## 2024-12-21 PROCEDURE — 80076 HEPATIC FUNCTION PANEL: CPT

## 2024-12-21 PROCEDURE — 2500000003 HC RX 250 WO HCPCS

## 2024-12-21 RX ORDER — SODIUM CHLORIDE 9 MG/ML
INJECTION, SOLUTION INTRAVENOUS PRN
Status: DISCONTINUED | OUTPATIENT
Start: 2024-12-21 | End: 2024-12-24 | Stop reason: HOSPADM

## 2024-12-21 RX ORDER — POTASSIUM CHLORIDE 1500 MG/1
40 TABLET, EXTENDED RELEASE ORAL PRN
Status: DISCONTINUED | OUTPATIENT
Start: 2024-12-21 | End: 2024-12-24 | Stop reason: HOSPADM

## 2024-12-21 RX ORDER — FENTANYL CITRATE 0.05 MG/ML
25 INJECTION, SOLUTION INTRAMUSCULAR; INTRAVENOUS
Status: DISCONTINUED | OUTPATIENT
Start: 2024-12-21 | End: 2024-12-22

## 2024-12-21 RX ORDER — ACETAMINOPHEN 650 MG/1
650 SUPPOSITORY RECTAL EVERY 6 HOURS PRN
Status: DISCONTINUED | OUTPATIENT
Start: 2024-12-21 | End: 2024-12-24 | Stop reason: HOSPADM

## 2024-12-21 RX ORDER — SODIUM CHLORIDE 0.9 % (FLUSH) 0.9 %
10 SYRINGE (ML) INJECTION PRN
Status: COMPLETED | OUTPATIENT
Start: 2024-12-21 | End: 2024-12-21

## 2024-12-21 RX ORDER — POTASSIUM CHLORIDE 7.45 MG/ML
10 INJECTION INTRAVENOUS PRN
Status: DISCONTINUED | OUTPATIENT
Start: 2024-12-21 | End: 2024-12-24 | Stop reason: HOSPADM

## 2024-12-21 RX ORDER — ONDANSETRON 4 MG/1
4 TABLET, ORALLY DISINTEGRATING ORAL EVERY 8 HOURS PRN
Status: DISCONTINUED | OUTPATIENT
Start: 2024-12-21 | End: 2024-12-24 | Stop reason: HOSPADM

## 2024-12-21 RX ORDER — IOPAMIDOL 755 MG/ML
75 INJECTION, SOLUTION INTRAVASCULAR
Status: COMPLETED | OUTPATIENT
Start: 2024-12-21 | End: 2024-12-21

## 2024-12-21 RX ORDER — SODIUM CHLORIDE 0.9 % (FLUSH) 0.9 %
10 SYRINGE (ML) INJECTION PRN
Status: DISCONTINUED | OUTPATIENT
Start: 2024-12-21 | End: 2024-12-24 | Stop reason: HOSPADM

## 2024-12-21 RX ORDER — LIDOCAINE 4 G/G
1 PATCH TOPICAL DAILY
Status: DISCONTINUED | OUTPATIENT
Start: 2024-12-22 | End: 2024-12-24 | Stop reason: HOSPADM

## 2024-12-21 RX ORDER — BISACODYL 10 MG
10 SUPPOSITORY, RECTAL RECTAL DAILY PRN
Status: DISCONTINUED | OUTPATIENT
Start: 2024-12-21 | End: 2024-12-22

## 2024-12-21 RX ORDER — ONDANSETRON 2 MG/ML
4 INJECTION INTRAMUSCULAR; INTRAVENOUS EVERY 6 HOURS PRN
Status: DISCONTINUED | OUTPATIENT
Start: 2024-12-21 | End: 2024-12-24 | Stop reason: HOSPADM

## 2024-12-21 RX ORDER — MAGNESIUM SULFATE HEPTAHYDRATE 40 MG/ML
2000 INJECTION, SOLUTION INTRAVENOUS PRN
Status: DISCONTINUED | OUTPATIENT
Start: 2024-12-21 | End: 2024-12-24 | Stop reason: HOSPADM

## 2024-12-21 RX ORDER — KETOROLAC TROMETHAMINE 30 MG/ML
15 INJECTION, SOLUTION INTRAMUSCULAR; INTRAVENOUS ONCE
Status: COMPLETED | OUTPATIENT
Start: 2024-12-21 | End: 2024-12-21

## 2024-12-21 RX ORDER — ENOXAPARIN SODIUM 100 MG/ML
40 INJECTION SUBCUTANEOUS DAILY
Status: DISCONTINUED | OUTPATIENT
Start: 2024-12-22 | End: 2024-12-24 | Stop reason: HOSPADM

## 2024-12-21 RX ORDER — DIPHENHYDRAMINE HYDROCHLORIDE 50 MG/ML
25 INJECTION INTRAMUSCULAR; INTRAVENOUS ONCE
Status: COMPLETED | OUTPATIENT
Start: 2024-12-21 | End: 2024-12-21

## 2024-12-21 RX ORDER — ACETAMINOPHEN 325 MG/1
650 TABLET ORAL EVERY 6 HOURS PRN
Status: DISCONTINUED | OUTPATIENT
Start: 2024-12-21 | End: 2024-12-24 | Stop reason: HOSPADM

## 2024-12-21 RX ORDER — SODIUM CHLORIDE 9 MG/ML
INJECTION, SOLUTION INTRAVENOUS CONTINUOUS
Status: ACTIVE | OUTPATIENT
Start: 2024-12-21 | End: 2024-12-22

## 2024-12-21 RX ORDER — SODIUM CHLORIDE 0.9 % (FLUSH) 0.9 %
5-40 SYRINGE (ML) INJECTION EVERY 12 HOURS SCHEDULED
Status: DISCONTINUED | OUTPATIENT
Start: 2024-12-21 | End: 2024-12-24 | Stop reason: HOSPADM

## 2024-12-21 RX ORDER — MORPHINE SULFATE 4 MG/ML
4 INJECTION, SOLUTION INTRAMUSCULAR; INTRAVENOUS ONCE
Status: COMPLETED | OUTPATIENT
Start: 2024-12-21 | End: 2024-12-21

## 2024-12-21 RX ORDER — FENTANYL CITRATE 0.05 MG/ML
50 INJECTION, SOLUTION INTRAMUSCULAR; INTRAVENOUS ONCE
Status: COMPLETED | OUTPATIENT
Start: 2024-12-21 | End: 2024-12-21

## 2024-12-21 RX ORDER — SODIUM CHLORIDE 9 MG/ML
INJECTION, SOLUTION INTRAVENOUS ONCE
Status: COMPLETED | OUTPATIENT
Start: 2024-12-21 | End: 2024-12-21

## 2024-12-21 RX ORDER — POLYETHYLENE GLYCOL 3350 17 G/17G
17 POWDER, FOR SOLUTION ORAL DAILY PRN
Status: DISCONTINUED | OUTPATIENT
Start: 2024-12-21 | End: 2024-12-22

## 2024-12-21 RX ADMIN — FENTANYL CITRATE 50 MCG: 50 INJECTION INTRAMUSCULAR; INTRAVENOUS at 18:17

## 2024-12-21 RX ADMIN — SODIUM CHLORIDE: 9 INJECTION, SOLUTION INTRAVENOUS at 19:13

## 2024-12-21 RX ADMIN — MORPHINE SULFATE 4 MG: 4 INJECTION, SOLUTION INTRAMUSCULAR; INTRAVENOUS at 20:40

## 2024-12-21 RX ADMIN — KETOROLAC TROMETHAMINE 15 MG: 30 INJECTION, SOLUTION INTRAMUSCULAR at 18:17

## 2024-12-21 RX ADMIN — IOPAMIDOL 75 ML: 755 INJECTION, SOLUTION INTRAVENOUS at 19:11

## 2024-12-21 RX ADMIN — DIPHENHYDRAMINE HYDROCHLORIDE 25 MG: 50 INJECTION INTRAMUSCULAR; INTRAVENOUS at 18:16

## 2024-12-21 RX ADMIN — FENTANYL CITRATE 25 MCG: 0.05 INJECTION, SOLUTION INTRAMUSCULAR; INTRAVENOUS at 23:33

## 2024-12-21 RX ADMIN — SODIUM CHLORIDE, PRESERVATIVE FREE 10 ML: 5 INJECTION INTRAVENOUS at 23:41

## 2024-12-21 RX ADMIN — SODIUM CHLORIDE, PRESERVATIVE FREE 10 ML: 5 INJECTION INTRAVENOUS at 19:11

## 2024-12-21 RX ADMIN — SODIUM CHLORIDE: 9 INJECTION, SOLUTION INTRAVENOUS at 23:30

## 2024-12-21 RX ADMIN — ONDANSETRON 4 MG: 2 INJECTION, SOLUTION INTRAMUSCULAR; INTRAVENOUS at 23:33

## 2024-12-21 ASSESSMENT — PAIN DESCRIPTION - LOCATION
LOCATION: BACK
LOCATION: ABDOMEN;BACK

## 2024-12-21 ASSESSMENT — PAIN - FUNCTIONAL ASSESSMENT: PAIN_FUNCTIONAL_ASSESSMENT: ACTIVITIES ARE NOT PREVENTED

## 2024-12-21 ASSESSMENT — PAIN SCALES - GENERAL
PAINLEVEL_OUTOF10: 5
PAINLEVEL_OUTOF10: 10

## 2024-12-21 ASSESSMENT — PAIN DESCRIPTION - ORIENTATION: ORIENTATION: RIGHT

## 2024-12-21 NOTE — ED PROVIDER NOTES
[facility]  Emergency Department Encounter  Emergency Medicine Physician     Pt Name: Suzan Garcia  MRN: [id number]  Birthdate 1965  Date of evaluation: 24  PCP:  No primary care provider on file.    CHIEF COMPLAINT       Chief Complaint   Patient presents with    Back Pain    Abdominal Pain    Nausea       HISTORY OF PRESENT ILLNESS  (Location/Symptom, Timing/Onset, Context/Setting, Quality, Duration, Modifying Factors, Severity.)    Suzan Garcia is a 59 y.o. female who presents with right-sided back pain.  Patient only speak Sami, utilizing a certified , interview was conducted.      Patient states it has been going on for a few weeks now.  Worsening.  States is becoming more severe, radiates throughout her back, abdomen into her pelvis.  She denies any numbness or tingling or paresthesias.  She denies any dysuria or hematuria.        PAST MEDICAL / SURGICAL / SOCIAL / FAMILY HISTORY    has a past medical history of Chronic back pain.     has a past surgical history that includes  section.    Social History     Socioeconomic History    Marital status:      Spouse name: Not on file    Number of children: Not on file    Years of education: Not on file    Highest education level: Not on file   Occupational History    Not on file   Tobacco Use    Smoking status: Never    Smokeless tobacco: Never   Vaping Use    Vaping status: Never Used   Substance and Sexual Activity    Alcohol use: No     Alcohol/week: 0.0 standard drinks of alcohol    Drug use: No    Sexual activity: Never     Partners: Male   Other Topics Concern    Not on file   Social History Narrative    Not on file     Social Determinants of Health     Financial Resource Strain: Low Risk  (3/27/2023)    Overall Financial Resource Strain (CARDIA)     Difficulty of Paying Living Expenses: Not hard at all   Food Insecurity: Not on file (3/27/2023)   Transportation Needs: Unknown

## 2024-12-22 ENCOUNTER — APPOINTMENT (OUTPATIENT)
Dept: GENERAL RADIOLOGY | Age: 59
DRG: 804 | End: 2024-12-22
Payer: COMMERCIAL

## 2024-12-22 PROBLEM — R59.1 LYMPHADENOPATHY, GENERALIZED: Status: ACTIVE | Noted: 2024-12-22

## 2024-12-22 PROBLEM — K80.50 BILIARY COLIC: Status: ACTIVE | Noted: 2024-12-22

## 2024-12-22 PROBLEM — D69.6 THROMBOCYTOPENIA (HCC): Status: ACTIVE | Noted: 2024-12-22

## 2024-12-22 PROBLEM — R59.0 CERVICAL LYMPHADENOPATHY: Status: ACTIVE | Noted: 2024-12-22

## 2024-12-22 LAB
ANION GAP SERPL CALCULATED.3IONS-SCNC: 8 MMOL/L (ref 9–16)
BASOPHILS # BLD: 0 K/UL (ref 0–0.2)
BASOPHILS NFR BLD: 0 % (ref 0–2)
BUN SERPL-MCNC: 9 MG/DL (ref 6–20)
CALCIUM SERPL-MCNC: 8.3 MG/DL (ref 8.6–10.4)
CHLORIDE SERPL-SCNC: 102 MMOL/L (ref 98–107)
CO2 SERPL-SCNC: 26 MMOL/L (ref 20–31)
CREAT SERPL-MCNC: 0.7 MG/DL (ref 0.7–1.2)
EOSINOPHIL # BLD: 1.48 K/UL (ref 0–0.4)
EOSINOPHILS RELATIVE PERCENT: 19 % (ref 0–4)
ERYTHROCYTE [DISTWIDTH] IN BLOOD BY AUTOMATED COUNT: 14.7 % (ref 11.5–14.9)
ERYTHROCYTE [SEDIMENTATION RATE] IN BLOOD BY PHOTOMETRIC METHOD: 12 MM/HR (ref 0–30)
FOLATE SERPL-MCNC: 24.1 NG/ML (ref 4.8–24.2)
GFR, ESTIMATED: >90 ML/MIN/1.73M2
GLUCOSE SERPL-MCNC: 113 MG/DL (ref 74–99)
HCT VFR BLD AUTO: 39.3 % (ref 36–46)
HGB BLD-MCNC: 13.1 G/DL (ref 12–16)
LDH SERPL-CCNC: 198 U/L (ref 135–214)
LYMPHOCYTES NFR BLD: 0.86 K/UL (ref 1–4.8)
LYMPHOCYTES RELATIVE PERCENT: 11 % (ref 24–44)
MCH RBC QN AUTO: 28.2 PG (ref 26–34)
MCHC RBC AUTO-ENTMCNC: 33.5 G/DL (ref 31–37)
MCV RBC AUTO: 84.3 FL (ref 80–100)
MONOCYTES NFR BLD: 0.78 K/UL (ref 0.1–1.3)
MONOCYTES NFR BLD: 10 % (ref 1–7)
MORPHOLOGY: ABNORMAL
NEUTROPHILS NFR BLD: 60 % (ref 36–66)
NEUTS SEG NFR BLD: 4.68 K/UL (ref 1.3–9.1)
PLATELET # BLD AUTO: 135 K/UL (ref 150–450)
PMV BLD AUTO: 7.9 FL (ref 6–12)
POTASSIUM SERPL-SCNC: 4.2 MMOL/L (ref 3.7–5.3)
RBC # BLD AUTO: 4.66 M/UL (ref 4–5.2)
RETICS # AUTO: 0.06 M/UL (ref 0.02–0.1)
RETICS/RBC NFR AUTO: 1.3 % (ref 0.5–2)
SODIUM SERPL-SCNC: 136 MMOL/L (ref 136–145)
TROPONIN I SERPL HS-MCNC: 22 NG/L (ref 0–14)
VIT B12 SERPL-MCNC: >2000 PG/ML (ref 232–1245)
WBC OTHER # BLD: 7.8 K/UL (ref 3.5–11)

## 2024-12-22 PROCEDURE — 86665 EPSTEIN-BARR CAPSID VCA: CPT

## 2024-12-22 PROCEDURE — 85025 COMPLETE CBC W/AUTO DIFF WBC: CPT

## 2024-12-22 PROCEDURE — 99222 1ST HOSP IP/OBS MODERATE 55: CPT | Performed by: INTERNAL MEDICINE

## 2024-12-22 PROCEDURE — 86663 EPSTEIN-BARR ANTIBODY: CPT

## 2024-12-22 PROCEDURE — 84484 ASSAY OF TROPONIN QUANT: CPT

## 2024-12-22 PROCEDURE — 72050 X-RAY EXAM NECK SPINE 4/5VWS: CPT

## 2024-12-22 PROCEDURE — 83615 LACTATE (LD) (LDH) ENZYME: CPT

## 2024-12-22 PROCEDURE — 88230 TISSUE CULTURE LYMPHOCYTE: CPT

## 2024-12-22 PROCEDURE — 85045 AUTOMATED RETICULOCYTE COUNT: CPT

## 2024-12-22 PROCEDURE — 6370000000 HC RX 637 (ALT 250 FOR IP): Performed by: STUDENT IN AN ORGANIZED HEALTH CARE EDUCATION/TRAINING PROGRAM

## 2024-12-22 PROCEDURE — 1200000000 HC SEMI PRIVATE

## 2024-12-22 PROCEDURE — 88185 FLOWCYTOMETRY/TC ADD-ON: CPT

## 2024-12-22 PROCEDURE — 6370000000 HC RX 637 (ALT 250 FOR IP)

## 2024-12-22 PROCEDURE — 85652 RBC SED RATE AUTOMATED: CPT

## 2024-12-22 PROCEDURE — 88271 CYTOGENETICS DNA PROBE: CPT

## 2024-12-22 PROCEDURE — 99223 1ST HOSP IP/OBS HIGH 75: CPT

## 2024-12-22 PROCEDURE — 2500000003 HC RX 250 WO HCPCS

## 2024-12-22 PROCEDURE — APPNB60 APP NON BILLABLE TIME 46-60 MINS: Performed by: NURSE PRACTITIONER

## 2024-12-22 PROCEDURE — 86664 EPSTEIN-BARR NUCLEAR ANTIGEN: CPT

## 2024-12-22 PROCEDURE — 2580000003 HC RX 258

## 2024-12-22 PROCEDURE — 88184 FLOWCYTOMETRY/ TC 1 MARKER: CPT

## 2024-12-22 PROCEDURE — 36415 COLL VENOUS BLD VENIPUNCTURE: CPT

## 2024-12-22 PROCEDURE — 82746 ASSAY OF FOLIC ACID SERUM: CPT

## 2024-12-22 PROCEDURE — 88273 CYTOGENETICS 10-30: CPT

## 2024-12-22 PROCEDURE — 82607 VITAMIN B-12: CPT

## 2024-12-22 PROCEDURE — 6360000002 HC RX W HCPCS

## 2024-12-22 PROCEDURE — 80048 BASIC METABOLIC PNL TOTAL CA: CPT

## 2024-12-22 RX ORDER — DICYCLOMINE HYDROCHLORIDE 10 MG/ML
20 INJECTION INTRAMUSCULAR ONCE
Status: COMPLETED | OUTPATIENT
Start: 2024-12-22 | End: 2024-12-22

## 2024-12-22 RX ORDER — AMLODIPINE BESYLATE 2.5 MG/1
2.5 TABLET ORAL DAILY
Status: DISCONTINUED | OUTPATIENT
Start: 2024-12-22 | End: 2024-12-24 | Stop reason: HOSPADM

## 2024-12-22 RX ORDER — POLYETHYLENE GLYCOL 3350 17 G/17G
17 POWDER, FOR SOLUTION ORAL 2 TIMES DAILY
Status: DISCONTINUED | OUTPATIENT
Start: 2024-12-22 | End: 2024-12-24 | Stop reason: HOSPADM

## 2024-12-22 RX ORDER — BISACODYL 10 MG
10 SUPPOSITORY, RECTAL RECTAL ONCE
Status: DISCONTINUED | OUTPATIENT
Start: 2024-12-22 | End: 2024-12-22

## 2024-12-22 RX ADMIN — MAGNESIUM HYDROXIDE 30 ML: 400 SUSPENSION ORAL at 12:58

## 2024-12-22 RX ADMIN — DICYCLOMINE HYDROCHLORIDE 20 MG: 10 INJECTION, SOLUTION INTRAMUSCULAR at 01:56

## 2024-12-22 RX ADMIN — POLYETHYLENE GLYCOL 3350 17 G: 17 POWDER, FOR SOLUTION ORAL at 19:42

## 2024-12-22 RX ADMIN — ONDANSETRON 4 MG: 2 INJECTION, SOLUTION INTRAMUSCULAR; INTRAVENOUS at 19:59

## 2024-12-22 RX ADMIN — BISACODYL 10 MG: 10 SUPPOSITORY RECTAL at 12:29

## 2024-12-22 RX ADMIN — ACETAMINOPHEN 650 MG: 325 TABLET ORAL at 19:40

## 2024-12-22 RX ADMIN — HYDROMORPHONE HYDROCHLORIDE 1 MG: 1 INJECTION, SOLUTION INTRAMUSCULAR; INTRAVENOUS; SUBCUTANEOUS at 09:35

## 2024-12-22 RX ADMIN — POLYETHYLENE GLYCOL 3350 17 G: 17 POWDER, FOR SOLUTION ORAL at 12:29

## 2024-12-22 RX ADMIN — PIPERACILLIN AND TAZOBACTAM 4500 MG: 4; .5 INJECTION, POWDER, FOR SOLUTION INTRAVENOUS at 06:41

## 2024-12-22 RX ADMIN — HYDROMORPHONE HYDROCHLORIDE 1 MG: 1 INJECTION, SOLUTION INTRAMUSCULAR; INTRAVENOUS; SUBCUTANEOUS at 01:28

## 2024-12-22 RX ADMIN — SODIUM CHLORIDE, PRESERVATIVE FREE 10 ML: 5 INJECTION INTRAVENOUS at 09:37

## 2024-12-22 RX ADMIN — PIPERACILLIN AND TAZOBACTAM 3375 MG: 3; .375 INJECTION, POWDER, LYOPHILIZED, FOR SOLUTION INTRAVENOUS at 11:17

## 2024-12-22 RX ADMIN — ONDANSETRON 4 MG: 2 INJECTION, SOLUTION INTRAMUSCULAR; INTRAVENOUS at 13:02

## 2024-12-22 RX ADMIN — MAGNESIUM HYDROXIDE 30 ML: 400 SUSPENSION ORAL at 19:42

## 2024-12-22 RX ADMIN — PIPERACILLIN AND TAZOBACTAM 3375 MG: 3; .375 INJECTION, POWDER, LYOPHILIZED, FOR SOLUTION INTRAVENOUS at 20:01

## 2024-12-22 RX ADMIN — ENOXAPARIN SODIUM 40 MG: 100 INJECTION SUBCUTANEOUS at 09:35

## 2024-12-22 RX ADMIN — HYDROMORPHONE HYDROCHLORIDE 1 MG: 1 INJECTION, SOLUTION INTRAMUSCULAR; INTRAVENOUS; SUBCUTANEOUS at 04:37

## 2024-12-22 RX ADMIN — HYDROMORPHONE HYDROCHLORIDE 1 MG: 1 INJECTION, SOLUTION INTRAMUSCULAR; INTRAVENOUS; SUBCUTANEOUS at 22:19

## 2024-12-22 RX ADMIN — AMLODIPINE BESYLATE 2.5 MG: 2.5 TABLET ORAL at 19:36

## 2024-12-22 ASSESSMENT — PAIN DESCRIPTION - LOCATION
LOCATION: ABDOMEN;BACK
LOCATION: HEAD
LOCATION: ABDOMEN;BACK
LOCATION: HEAD
LOCATION: ABDOMEN;BACK
LOCATION: ABDOMEN

## 2024-12-22 ASSESSMENT — PAIN SCALES - GENERAL
PAINLEVEL_OUTOF10: 0
PAINLEVEL_OUTOF10: 10
PAINLEVEL_OUTOF10: 10
PAINLEVEL_OUTOF10: 4
PAINLEVEL_OUTOF10: 5
PAINLEVEL_OUTOF10: 10
PAINLEVEL_OUTOF10: 4
PAINLEVEL_OUTOF10: 10

## 2024-12-22 ASSESSMENT — PAIN DESCRIPTION - DESCRIPTORS
DESCRIPTORS: THROBBING
DESCRIPTORS: THROBBING
DESCRIPTORS: STABBING
DESCRIPTORS: SHARP
DESCRIPTORS: STABBING

## 2024-12-22 ASSESSMENT — PAIN DESCRIPTION - ORIENTATION
ORIENTATION: RIGHT
ORIENTATION: RIGHT;LEFT
ORIENTATION: RIGHT

## 2024-12-22 ASSESSMENT — PAIN - FUNCTIONAL ASSESSMENT
PAIN_FUNCTIONAL_ASSESSMENT: ACTIVITIES ARE NOT PREVENTED
PAIN_FUNCTIONAL_ASSESSMENT: PREVENTS OR INTERFERES SOME ACTIVE ACTIVITIES AND ADLS

## 2024-12-22 ASSESSMENT — PAIN SCALES - WONG BAKER
WONGBAKER_NUMERICALRESPONSE: 0;2
WONGBAKER_NUMERICALRESPONSE: HURTS A LITTLE BIT

## 2024-12-22 NOTE — PLAN OF CARE
Problem: Discharge Planning  Goal: Discharge to home or other facility with appropriate resources  12/22/2024 1131 by Reena Lopez RN  Outcome: Progressing  Flowsheets (Taken 12/22/2024 1131)  Discharge to home or other facility with appropriate resources: Identify barriers to discharge with patient and caregiver  12/22/2024 0321 by Zeina Cadet RN  Outcome: Progressing     Problem: Safety - Adult  Goal: Free from fall injury  12/22/2024 1131 by Reena Lopez RN  Outcome: Progressing  Flowsheets (Taken 12/22/2024 1131)  Free From Fall Injury: Instruct family/caregiver on patient safety  12/22/2024 0321 by Zeina Cadet RN  Outcome: Progressing     Problem: Pain  Goal: Verbalizes/displays adequate comfort level or baseline comfort level  Outcome: Progressing  Flowsheets (Taken 12/22/2024 1131)  Verbalizes/displays adequate comfort level or baseline comfort level:   Encourage patient to monitor pain and request assistance   Assess pain using appropriate pain scale   Administer analgesics based on type and severity of pain and evaluate response   Implement non-pharmacological measures as appropriate and evaluate response

## 2024-12-22 NOTE — H&P
n.p.o. at midnight.  Elevated LFTs likely secondary to #2.  Continue to monitor.  Thoracic and abdominal lymphadenopathy.  Talked with oncologist, he will proceed with excisional biopsy, will talk with general surgery.  Mild thrombocytopenia.  DVT prophylaxis Lovenox.  PT OT.    Consultations:   IP CONSULT TO INTERNAL MEDICINE  IP CONSULT TO GENERAL SURGERY  IP CONSULT TO GI  IP CONSULT TO HEM/ONC      Patient is admitted as inpatient status because of co-morbidities listed above, severity of signs and symptoms as outlined, requirement for current medical therapies and most importantly because of direct risk to patient if care not provided in a hospital setting.  Expected length of stay > 48 hours.    Edita Hawthorne MD  12/22/2024  4:24 PM    Copy sent to Dr. Raymond primary care provider on file.    Please note that this chart was generated using voice recognition Dragon dictation software.  Although every effort was made to ensure the accuracy of this automated transcription, some errors in transcription may have occurred.

## 2024-12-22 NOTE — CARE COORDINATION
Case Management Assessment  Initial Evaluation    Date/Time of Evaluation: 12/22/2024 9:37 AM  Assessment Completed by: Maryanne Vallejo RN    If patient is discharged prior to next notation, then this note serves as note for discharge by case management.    Patient Name: Suzan Garcia                   YOB: 1965  Diagnosis: Biliary colic [K80.50]  Gallstones [K80.20]  Cholelithiasis without cholangitis [K80.20]                   Date / Time: 12/21/2024  5:06 PM    Patient Admission Status: Inpatient   Readmission Risk (Low < 19, Mod (19-27), High > 27): Readmission Risk Score: 4.7    Current PCP: No primary care provider on file.  PCP verified by CM? Yes    Chart Reviewed: Yes      History Provided by: Patient, Other (see comment) (Used , Damion [id number], Pt. Speaks [place] citizen)  Patient Orientation: Alert and Oriented    Patient Cognition: Alert    Hospitalization in the last 30 days (Readmission):  No    If yes, Readmission Assessment in CM Navigator will be completed.    Advance Directives:      Code Status: Full Code   Patient's Primary Decision Maker is:        Discharge Planning:    Patient lives with: Spouse/Significant Other, Children, Family Members Type of Home: House  Primary Care Giver: Self  Patient Support Systems include: Spouse/Significant Other, Children, Family Members   Current Financial resources: None  Current community resources: None  Current services prior to admission: Durable Medical Equipment            Current DME: Shower Chair            Type of Home Care services:  None    ADLS  Prior functional level: Independent in ADLs/IADLs, Assistance with the following:, Shopping  Current functional level: Independent in ADLs/IADLs, Assistance with the following:, Shopping    PT AM-PAC:   /24  OT AM-PAC:   /24    Family can provide assistance at DC: Yes  Would you like Case Management to discuss the discharge plan with any other family members/significant others, and

## 2024-12-23 ENCOUNTER — APPOINTMENT (OUTPATIENT)
Dept: ULTRASOUND IMAGING | Age: 59
DRG: 804 | End: 2024-12-23
Payer: COMMERCIAL

## 2024-12-23 ENCOUNTER — APPOINTMENT (OUTPATIENT)
Dept: NUCLEAR MEDICINE | Age: 59
DRG: 804 | End: 2024-12-23
Payer: COMMERCIAL

## 2024-12-23 ENCOUNTER — APPOINTMENT (OUTPATIENT)
Dept: INTERVENTIONAL RADIOLOGY/VASCULAR | Age: 59
DRG: 804 | End: 2024-12-23
Attending: STUDENT IN AN ORGANIZED HEALTH CARE EDUCATION/TRAINING PROGRAM
Payer: COMMERCIAL

## 2024-12-23 LAB
ABSOLUTE BANDS: 0.41 K/UL (ref 0–1)
ALBUMIN SERPL-MCNC: 3.7 G/DL (ref 3.5–5.2)
ALP SERPL-CCNC: 111 U/L (ref 35–104)
ALT SERPL-CCNC: 35 U/L (ref 10–35)
ANION GAP SERPL CALCULATED.3IONS-SCNC: 9 MMOL/L (ref 9–16)
AST SERPL-CCNC: 28 U/L (ref 10–35)
BANDS: 5 % (ref 0–10)
BASOPHILS # BLD: 0 K/UL (ref 0–0.2)
BASOPHILS NFR BLD: 0 % (ref 0–2)
BILIRUB DIRECT SERPL-MCNC: 0.6 MG/DL (ref 0–0.3)
BILIRUB INDIRECT SERPL-MCNC: 1 MG/DL (ref 0–1)
BILIRUB SERPL-MCNC: 1.6 MG/DL (ref 0–1.2)
BUN SERPL-MCNC: 11 MG/DL (ref 6–20)
CALCIUM SERPL-MCNC: 8.7 MG/DL (ref 8.6–10.4)
CASE NUMBER:: NORMAL
CHLORIDE SERPL-SCNC: 101 MMOL/L (ref 98–107)
CO2 SERPL-SCNC: 27 MMOL/L (ref 20–31)
CREAT SERPL-MCNC: 0.8 MG/DL (ref 0.7–1.2)
EBV EA-D IGG SER-ACNC: 33 U/ML
EBV INTERPRETATION: ABNORMAL
EBV NA IGG SER IA-ACNC: 499 U/ML
EBV VCA IGG SER-ACNC: 772 U/ML
EBV VCA IGM SER-ACNC: 20 U/ML
EKG ATRIAL RATE: 72 BPM
EKG P AXIS: 55 DEGREES
EKG P-R INTERVAL: 138 MS
EKG Q-T INTERVAL: 396 MS
EKG QRS DURATION: 84 MS
EKG QTC CALCULATION (BAZETT): 433 MS
EKG R AXIS: -51 DEGREES
EKG T AXIS: 29 DEGREES
EKG VENTRICULAR RATE: 72 BPM
EOSINOPHIL # BLD: 0.89 K/UL (ref 0–0.4)
EOSINOPHILS RELATIVE PERCENT: 11 % (ref 0–4)
ERYTHROCYTE [DISTWIDTH] IN BLOOD BY AUTOMATED COUNT: 14.6 % (ref 11.5–14.9)
GFR, ESTIMATED: 85 ML/MIN/1.73M2
GLUCOSE SERPL-MCNC: 106 MG/DL (ref 74–99)
HCT VFR BLD AUTO: 37.1 % (ref 36–46)
HCT VFR BLD AUTO: 37.6 % (ref 36–46)
HGB BLD-MCNC: 12.4 G/DL (ref 12–16)
HGB BLD-MCNC: 12.5 G/DL (ref 12–16)
LYMPHOCYTES NFR BLD: 1.05 K/UL (ref 1–4.8)
LYMPHOCYTES RELATIVE PERCENT: 13 % (ref 24–44)
MCH RBC QN AUTO: 27.8 PG (ref 26–34)
MCHC RBC AUTO-ENTMCNC: 33.4 G/DL (ref 31–37)
MCV RBC AUTO: 83.3 FL (ref 80–100)
MONOCYTES NFR BLD: 0.57 K/UL (ref 0.1–1.3)
MONOCYTES NFR BLD: 7 % (ref 1–7)
MORPHOLOGY: NORMAL
NEUTROPHILS NFR BLD: 64 % (ref 36–66)
NEUTS SEG NFR BLD: 5.18 K/UL (ref 1.3–9.1)
PLATELET # BLD AUTO: 149 K/UL (ref 150–450)
PMV BLD AUTO: 7.4 FL (ref 6–12)
POTASSIUM SERPL-SCNC: 4.1 MMOL/L (ref 3.7–5.3)
PROT SERPL-MCNC: 7.9 G/DL (ref 6.6–8.7)
RBC # BLD AUTO: 4.51 M/UL (ref 4–5.2)
SEND OUT REPORT: NORMAL
SEND OUT REPORT: NORMAL
SODIUM SERPL-SCNC: 137 MMOL/L (ref 136–145)
SPECIMEN DESCRIPTION: NORMAL
SURGICAL PATHOLOGY REPORT: NORMAL
TEST NAME: NORMAL
TEST NAME: NORMAL
WBC OTHER # BLD: 8.1 K/UL (ref 3.5–11)

## 2024-12-23 PROCEDURE — 99232 SBSQ HOSP IP/OBS MODERATE 35: CPT | Performed by: INTERNAL MEDICINE

## 2024-12-23 PROCEDURE — 6360000002 HC RX W HCPCS

## 2024-12-23 PROCEDURE — 85025 COMPLETE CBC W/AUTO DIFF WBC: CPT

## 2024-12-23 PROCEDURE — 2580000003 HC RX 258

## 2024-12-23 PROCEDURE — 88333 PATH CONSLTJ SURG CYTO XM 1: CPT

## 2024-12-23 PROCEDURE — 78227 HEPATOBIL SYST IMAGE W/DRUG: CPT

## 2024-12-23 PROCEDURE — 76705 ECHO EXAM OF ABDOMEN: CPT

## 2024-12-23 PROCEDURE — 88342 IMHCHEM/IMCYTCHM 1ST ANTB: CPT

## 2024-12-23 PROCEDURE — 88307 TISSUE EXAM BY PATHOLOGIST: CPT

## 2024-12-23 PROCEDURE — 88341 IMHCHEM/IMCYTCHM EA ADD ANTB: CPT

## 2024-12-23 PROCEDURE — 2709999900 IR BIOPSY LYMPH NODE SUPERFICIAL

## 2024-12-23 PROCEDURE — 99231 SBSQ HOSP IP/OBS SF/LOW 25: CPT | Performed by: STUDENT IN AN ORGANIZED HEALTH CARE EDUCATION/TRAINING PROGRAM

## 2024-12-23 PROCEDURE — 88173 CYTOPATH EVAL FNA REPORT: CPT

## 2024-12-23 PROCEDURE — 99233 SBSQ HOSP IP/OBS HIGH 50: CPT | Performed by: INTERNAL MEDICINE

## 2024-12-23 PROCEDURE — 6360000002 HC RX W HCPCS: Performed by: INTERNAL MEDICINE

## 2024-12-23 PROCEDURE — A9537 TC99M MEBROFENIN: HCPCS

## 2024-12-23 PROCEDURE — 88185 FLOWCYTOMETRY/TC ADD-ON: CPT

## 2024-12-23 PROCEDURE — 88305 TISSUE EXAM BY PATHOLOGIST: CPT

## 2024-12-23 PROCEDURE — 2500000003 HC RX 250 WO HCPCS

## 2024-12-23 PROCEDURE — 80076 HEPATIC FUNCTION PANEL: CPT

## 2024-12-23 PROCEDURE — 6370000000 HC RX 637 (ALT 250 FOR IP): Performed by: STUDENT IN AN ORGANIZED HEALTH CARE EDUCATION/TRAINING PROGRAM

## 2024-12-23 PROCEDURE — 88184 FLOWCYTOMETRY/ TC 1 MARKER: CPT

## 2024-12-23 PROCEDURE — 80048 BASIC METABOLIC PNL TOTAL CA: CPT

## 2024-12-23 PROCEDURE — 36415 COLL VENOUS BLD VENIPUNCTURE: CPT

## 2024-12-23 PROCEDURE — 85014 HEMATOCRIT: CPT

## 2024-12-23 PROCEDURE — 85018 HEMOGLOBIN: CPT

## 2024-12-23 PROCEDURE — 6370000000 HC RX 637 (ALT 250 FOR IP): Performed by: NURSE PRACTITIONER

## 2024-12-23 PROCEDURE — 88360 TUMOR IMMUNOHISTOCHEM/MANUAL: CPT

## 2024-12-23 PROCEDURE — 1200000000 HC SEMI PRIVATE

## 2024-12-23 PROCEDURE — 88172 CYTP DX EVAL FNA 1ST EA SITE: CPT

## 2024-12-23 PROCEDURE — 07B23ZX EXCISION OF LEFT NECK LYMPHATIC, PERCUTANEOUS APPROACH, DIAGNOSTIC: ICD-10-PCS | Performed by: RADIOLOGY

## 2024-12-23 PROCEDURE — 10005 FNA BX W/US GDN 1ST LES: CPT

## 2024-12-23 PROCEDURE — 2580000003 HC RX 258: Performed by: INTERNAL MEDICINE

## 2024-12-23 PROCEDURE — 88334 PATH CONSLTJ SURG CYTO XM EA: CPT

## 2024-12-23 PROCEDURE — 93010 ELECTROCARDIOGRAM REPORT: CPT | Performed by: INTERNAL MEDICINE

## 2024-12-23 PROCEDURE — 3430000000 HC RX DIAGNOSTIC RADIOPHARMACEUTICAL

## 2024-12-23 RX ORDER — SODIUM CHLORIDE 0.9 % (FLUSH) 0.9 %
10 SYRINGE (ML) INJECTION PRN
Status: DISCONTINUED | OUTPATIENT
Start: 2024-12-23 | End: 2024-12-24 | Stop reason: HOSPADM

## 2024-12-23 RX ORDER — AMLODIPINE BESYLATE 2.5 MG/1
2.5 TABLET ORAL ONCE
Status: COMPLETED | OUTPATIENT
Start: 2024-12-23 | End: 2024-12-23

## 2024-12-23 RX ADMIN — HYDROMORPHONE HYDROCHLORIDE 1 MG: 1 INJECTION, SOLUTION INTRAMUSCULAR; INTRAVENOUS; SUBCUTANEOUS at 22:34

## 2024-12-23 RX ADMIN — Medication 4.7 MILLICURIE: at 08:32

## 2024-12-23 RX ADMIN — PIPERACILLIN AND TAZOBACTAM 3375 MG: 3; .375 INJECTION, POWDER, LYOPHILIZED, FOR SOLUTION INTRAVENOUS at 03:04

## 2024-12-23 RX ADMIN — AMLODIPINE BESYLATE 2.5 MG: 2.5 TABLET ORAL at 22:24

## 2024-12-23 RX ADMIN — POLYETHYLENE GLYCOL 3350 17 G: 17 POWDER, FOR SOLUTION ORAL at 07:22

## 2024-12-23 RX ADMIN — SODIUM CHLORIDE, PRESERVATIVE FREE 10 ML: 5 INJECTION INTRAVENOUS at 20:11

## 2024-12-23 RX ADMIN — SODIUM CHLORIDE, PRESERVATIVE FREE 10 ML: 5 INJECTION INTRAVENOUS at 08:32

## 2024-12-23 RX ADMIN — ONDANSETRON 4 MG: 2 INJECTION, SOLUTION INTRAMUSCULAR; INTRAVENOUS at 06:33

## 2024-12-23 RX ADMIN — POLYETHYLENE GLYCOL 3350 17 G: 17 POWDER, FOR SOLUTION ORAL at 20:11

## 2024-12-23 RX ADMIN — CEFEPIME 2000 MG: 2 INJECTION, POWDER, FOR SOLUTION INTRAVENOUS at 13:23

## 2024-12-23 ASSESSMENT — PAIN DESCRIPTION - LOCATION: LOCATION: BACK

## 2024-12-23 ASSESSMENT — PAIN DESCRIPTION - DESCRIPTORS: DESCRIPTORS: ACHING

## 2024-12-23 ASSESSMENT — ENCOUNTER SYMPTOMS
ABDOMINAL PAIN: 0
ALLERGIC/IMMUNOLOGIC NEGATIVE: 1
DIARRHEA: 0
NAUSEA: 0
WHEEZING: 0
SHORTNESS OF BREATH: 0
CONSTIPATION: 0
VOMITING: 0
COUGH: 0
BACK PAIN: 0

## 2024-12-23 ASSESSMENT — PAIN DESCRIPTION - ORIENTATION: ORIENTATION: RIGHT;LEFT

## 2024-12-23 ASSESSMENT — PAIN SCALES - GENERAL
PAINLEVEL_OUTOF10: 0
PAINLEVEL_OUTOF10: 5

## 2024-12-23 NOTE — CONSULTS
Gastroenterology Consult Note    Patient:   Suzan Garcia   Admit date:  2024  Facility:   [facility]  Referring/PCP: No primary care provider on file.  Date:     2024  Consultant:   YULI Thomas - MAGDA, Rene Victoria MD    Subjective:     This 59 y.o. female was admitted 2024 with a diagnosis of \"Biliary colic [K80.50]  Gallstones [K80.20]  Cholelithiasis without cholangitis [K80.20]\" and is seen in consultation regarding   Chief Complaint   Patient presents with    Back Pain    Abdominal Pain    Nausea     59/F w/ pmhx of chronic back pain presents to ED with lower back pain, abdominal pain, nausea.  Onset Friday evening.  Reports pain started in her lower back and radiated to her abdomen and pelvic area.  Reports some difficulty urinating on Friday.  Reports nausea without vomiting.  Had some chills, subjective fever per patient.  Denies any vomiting, change in bowel habits, weight loss.  In the ED labs significant for , bilirubin 1.3, plt 136.  Monocytes 10, eosinophils 19.  UA with trace leukocytes.    CT abd/pelvis:  1. No acute intra-abdominal or intrapelvic abnormalities.  2. Mild hepatomegaly.  3. Nonspecific low-density lesion in the left hepatic lobe measuring 15 mm,  possibly a hemangioma.  Recommend nonemergent outpatient correlation with  sonography.  4. Partially visualize lymph nodes in the lower axillary region as  demonstrated on previous chest CT.  Multiple nonspecific mesenteric, pelvic,  inguinal and periaortic lymph nodes measuring up to 15 mm.  PET scan should  be considered.     US in ED showed cholelithiasis, GB sludge.  + Godfrey's.    On exam she has diffuse abdominal tenderness.  BS active  Has enlarged cervical lymph nodes.    Past Medical History:  Past Medical History:   Diagnosis Date    Chronic back pain        Past Surgical History:  Past Surgical History:   Procedure Laterality Date     SECTION         Social 
Session ID: [id number]  Language: Divehi   ID: #[id number]   Name: Analia
Session ID: [id number]  Language: Slovenian   ID: #[id number]   Name: Armani
Session ID: [id number]  Language: Slovenian   ID: #[id number]   Name: Yesy
Session ID: [id number]  Language: Kyrgyz   ID: #[id number]   Name: Taurus
Session ID: [id number]  Language: Icelandic   ID: #[id number]   Name: Sebastian
Session ID: [id number]  Language: Bengali   ID: #[id number]   Name: Sunita
Session ID: [id number]  Language: Luxembourgish   ID: #[id number]   Name: Keesha
Session ID: [id number]  Language: Korean   ID: #[id number]   Name: Irma Castellano
Connections: Not on file   Intimate Partner Violence: Not on file   Housing Stability: Low Risk  (12/22/2024)    Housing Stability Vital Sign     Unable to Pay for Housing in the Last Year: No     Number of Times Moved in the Last Year: 1     Homeless in the Last Year: No       Family History:       Problem Relation Age of Onset    Diabetes Mother        REVIEW OF SYSTEMS:    CONSTITUTIONAL: Denies recent weight loss, fatigue, fevers, chills.  HEENT: Denies rhinorrhea, dysphagia, odynphagia.   CARDIOVASCULAR: Denies history of MI, recent chest pain.  RESPIRATORY: Denies recent history of shortness of breath or history of PE.  GASTROINTESTINAL: per hpi  GENITOURINARY: Denies increased frequency or dysuria.  HEMATOLOGIC/LYMPHATIC: Denies history of anemia or DVTs.  ENDOCRINE: Denies history of thyroid problems or diabetes.  NEURO: Denies history of CVA, TIA.  Review of systems negative unless listed above.    PHYSICAL EXAM:    VITALS:  BP (!) 144/79   Pulse 69   Temp 98.6 °F (37 °C) (Oral)   Resp 18   Ht 1.524 m (5')   Wt 72.6 kg (160 lb)   LMP 10/13/2015 (Approximate) Comment: possible menopause  SpO2 91%   BMI 31.25 kg/m²   INTAKE/OUTPUT:     Intake/Output Summary (Last 24 hours) at 12/22/2024 1103  Last data filed at 12/22/2024 0723  Gross per 24 hour   Intake 215 ml   Output 200 ml   Net 15 ml       CONSTITUTIONAL:  awake, alert, not distressed  HEENT: Normocephalic/atraumatic, without obvious abnormality.   NECK:  Supple, symmetrical, trachea midline   CARDIOVASCULAR: Regular rate and rhythm  LUNGS: Equal chest rise and fall without audible wheezing.  ABDOMEN: Soft, nondistended, no rebound, no guarding, some right lower pelvic and left-sided discomfort with deep palpation  MUSCULOSKELETAL: Muscle strength intact in all extremities bilaterally.  NEUROLOGIC: Gross motor intact without focal weakness.  SKIN: No cyanosis, rashes, or edema noted.   Orientation:   oriented to person, place, and time    CBC 
(24hrs), Av.9 °F (36.6 °C), Min:97.1 °F (36.2 °C), Max:98.6 °F (37 °C)    General appearance - well appearing, no in pain or distress   Mental status - alert and cooperative   Eyes - pupils equal and reactive, extraocular eye movements intact   Ears - bilateral TM's and external ear canals normal   Mouth - mucous membranes moist, pharynx normal without lesions   Neck - supple, no significant adenopathy   Lymphatics -bilateral cervical lymphadenopathy palpable more so on the left side  Chest - clear to auscultation, no wheezes, rales or rhonchi, symmetric air entry   Heart - normal rate, regular rhythm, normal S1, S2, no murmurs  Abdomen - soft, mildly tender, nondistended, no masses or organomegaly   Neurological - alert, oriented, normal speech, no focal findings or movement disorder noted   Musculoskeletal - no joint tenderness, deformity or swelling   Extremities - peripheral pulses normal, no pedal edema, no clubbing or cyanosis   Skin - normal coloration and turgor, no rashes, no suspicious skin lesions noted ,    DATA:    Labs:   CBC:   Recent Labs     24  1815 24  0703   WBC 8.7 7.8   HGB 13.3 13.1   HCT 39.7 39.3   * 135*     BMP:   Recent Labs     24  1815 24  0703   * 136   K 3.8 4.2   CO2 26 26   BUN 9 9   CREATININE 0.6* 0.7   LABGLOM >90 >90   GLUCOSE 133* 113*     PT/INR: No results for input(s): \"PROTIME\", \"INR\" in the last 72 hours.    IMAGING DATA:  CT ABDOMEN PELVIS W IV CONTRAST Additional Contrast? None   Final Result   1. No acute intra-abdominal or intrapelvic abnormalities.   2. Mild hepatomegaly.   3. Nonspecific low-density lesion in the left hepatic lobe measuring 15 mm,   possibly a hemangioma.  Recommend nonemergent outpatient correlation with   sonography.   4. Partially visualize lymph nodes in the lower axillary region as   demonstrated on previous chest CT.  Multiple nonspecific mesenteric, pelvic,   inguinal and periaortic lymph nodes

## 2024-12-23 NOTE — CARE COORDINATION
ONGOING DISCHARGE PLAN:    Review of chart.    Patient is Macedonian speaking and needs to utilize .    POD #0 IR core needle biopsy for rule out lymphoma.    GI/General surgery-choledocolithiasis. HIDA scan ordered.    DME: [place]. Requested walker, this order has been placed for hospital deliver.    VNS: None.    Will continue to follow for additional discharge needs.    If patient is discharged prior to next notation, then this note serves as note for discharge by case management.    Electronically signed by Kristin Navarro RN on 12/23/2024 at 12:10 PM

## 2024-12-23 NOTE — CARE COORDINATION
[facility] Encounter Date/Time: 2024 1706    Hospital Account: [id number]    MRN: [id number]    Patient: Suzan Raza    Contact Serial #: [id number]      ENCOUNTER          Patient Class: I Private Enc?  No Unit RM BD: STCZ MED ALLAN    Hospital Service: INM   Encounter DX: Biliary colic [K80.50]   ADM Provider: Edita Hawthorne MD   Procedure:     ATT Provider: Edita Hawthorne MD   REF Provider:        Admission DX: Biliary colic, Gallstones, Cholelithiasis without cholangitis and DX codes: K80.50, K80.20, K80.20      PATIENT             Name: Suzan Raza : 1965 (59 yrs)   Address: [address] [place] [address] Sex: Female   City: [place] [address]         Marital Status:    Employer: NOT EMPLOYED         Restorationism: Yarsanism   Primary Care Provider:           Primary Phone: [phone]   EMERGENCY CONTACT   Name Home Phone Work Phone Mobile Phone Relationship Lgl Grd   MATT RANDALL [phone]     Spouse     RITIKA RAZA   [phone] Child           GUARANTOR            Guarantor: Suzan Raza     : 1965   Address: [address] [place] [address] Sex: [place] [address]     Relation to Patient: Self       Home Phone: [phone]   Guarantor ID: [id number]       Work Phone:     Guarantor Employer: NOT EMPLOYED         Status: NOT EMPLO*      COVERAGE        PRIMARY INSURANCE   Payor: [employer] Plan: [facility] SHARED SERVICES   Payor Address: [facility] [id number],  [place] [address]       Group Number: [id number] Insurance Type: INDEMNITY   Subscriber Name: MATT RAZA Subscriber : 1960   Subscriber ID: [id number] Pat. Rel. to Sub: Spouse   SECONDARY INSURANCE   Payor:   Plan:     Payor Address:  ,           Group Number:   Insurance Type:     Subscriber Name:   Subscriber :     Subscriber ID:   Pat. Rel. to Sub:          CSN: [id number]     [id number]        CSN                                    Req/Control #

## 2024-12-23 NOTE — BRIEF OP NOTE
Brief Postoperative Note    Suzan Garcia  YOB: 1965  [id number]    Pre-operative Diagnosis: Cervical Lymphadenopathy    Post-operative Diagnosis: Same    Procedure: Ultrasound Guided left cervical lymph node biopsy    Anesthesia: Local    Surgeons/Assistants: Dr. Dawn and Flory Vance PA-C    Estimated Blood Loss: minimal    Complications: None    Specimens: Was Obtained    Findings: Assuming successful left cervical lymph node bx using ultrasound imaging. 3 passes for FNA with a 25 g needle and 3 core samples collected with a 20g x 10cm M-Biopsy device. Pathology present to interpret specimens. Specimens were deemed adequate. Dressing applied. Patient tolerated procedure well.    Electronically signed by Flory Vance PA-C on 12/23/2024 at 12:08 PM

## 2024-12-23 NOTE — OR NURSING
Patient brought to the IR suite via cart, after consent obtained, using the language services, patient placed.  Patient then positioned/prepped/draped, patient refused language services during procedure prep.

## 2024-12-24 VITALS
TEMPERATURE: 98.4 F | OXYGEN SATURATION: 99 % | BODY MASS INDEX: 31.41 KG/M2 | DIASTOLIC BLOOD PRESSURE: 85 MMHG | RESPIRATION RATE: 17 BRPM | WEIGHT: 160 LBS | HEIGHT: 60 IN | HEART RATE: 64 BPM | SYSTOLIC BLOOD PRESSURE: 150 MMHG

## 2024-12-24 LAB
ALBUMIN SERPL-MCNC: 3.6 G/DL (ref 3.5–5.2)
ALP SERPL-CCNC: 105 U/L (ref 35–104)
ALT SERPL-CCNC: 41 U/L (ref 10–35)
ANION GAP SERPL CALCULATED.3IONS-SCNC: 9 MMOL/L (ref 9–16)
AST SERPL-CCNC: 30 U/L (ref 10–35)
BASOPHILS # BLD: 0.08 K/UL (ref 0–0.2)
BASOPHILS NFR BLD: 1 % (ref 0–2)
BILIRUB SERPL-MCNC: 1.4 MG/DL (ref 0–1.2)
BUN SERPL-MCNC: 14 MG/DL (ref 6–20)
CALCIUM SERPL-MCNC: 8.7 MG/DL (ref 8.6–10.4)
CHLORIDE SERPL-SCNC: 102 MMOL/L (ref 98–107)
CO2 SERPL-SCNC: 26 MMOL/L (ref 20–31)
CREAT SERPL-MCNC: 0.7 MG/DL (ref 0.7–1.2)
EOSINOPHIL # BLD: 1.41 K/UL (ref 0–0.4)
EOSINOPHILS RELATIVE PERCENT: 17 % (ref 0–4)
ERYTHROCYTE [DISTWIDTH] IN BLOOD BY AUTOMATED COUNT: 14.6 % (ref 11.5–14.9)
GFR, ESTIMATED: >90 ML/MIN/1.73M2
GLUCOSE SERPL-MCNC: 145 MG/DL (ref 74–99)
HCT VFR BLD AUTO: 36.2 % (ref 36–46)
HCT VFR BLD AUTO: 37 % (ref 36–46)
HGB BLD-MCNC: 11.9 G/DL (ref 12–16)
HGB BLD-MCNC: 12.4 G/DL (ref 12–16)
LYMPHOCYTES NFR BLD: 1.83 K/UL (ref 1–4.8)
LYMPHOCYTES RELATIVE PERCENT: 22 % (ref 24–44)
MCH RBC QN AUTO: 27.6 PG (ref 26–34)
MCHC RBC AUTO-ENTMCNC: 33.4 G/DL (ref 31–37)
MCV RBC AUTO: 82.9 FL (ref 80–100)
MOLECULAR CYTOGENIC FISH: NORMAL
MONOCYTES NFR BLD: 0.75 K/UL (ref 0.1–1.3)
MONOCYTES NFR BLD: 9 % (ref 1–7)
MORPHOLOGY: ABNORMAL
MORPHOLOGY: ABNORMAL
NEUTROPHILS NFR BLD: 51 % (ref 36–66)
NEUTS SEG NFR BLD: 4.23 K/UL (ref 1.3–9.1)
PLATELET # BLD AUTO: 172 K/UL (ref 150–450)
PMV BLD AUTO: 7.4 FL (ref 6–12)
POTASSIUM SERPL-SCNC: 4.1 MMOL/L (ref 3.7–5.3)
PROT SERPL-MCNC: 8 G/DL (ref 6.6–8.7)
RBC # BLD AUTO: 4.47 M/UL (ref 4–5.2)
SODIUM SERPL-SCNC: 137 MMOL/L (ref 136–145)
WBC OTHER # BLD: 8.3 K/UL (ref 3.5–11)

## 2024-12-24 PROCEDURE — 85025 COMPLETE CBC W/AUTO DIFF WBC: CPT

## 2024-12-24 PROCEDURE — 6360000002 HC RX W HCPCS: Performed by: INTERNAL MEDICINE

## 2024-12-24 PROCEDURE — 85018 HEMOGLOBIN: CPT

## 2024-12-24 PROCEDURE — 80053 COMPREHEN METABOLIC PANEL: CPT

## 2024-12-24 PROCEDURE — 99221 1ST HOSP IP/OBS SF/LOW 40: CPT | Performed by: STUDENT IN AN ORGANIZED HEALTH CARE EDUCATION/TRAINING PROGRAM

## 2024-12-24 PROCEDURE — 2580000003 HC RX 258: Performed by: INTERNAL MEDICINE

## 2024-12-24 PROCEDURE — 99239 HOSP IP/OBS DSCHRG MGMT >30: CPT | Performed by: INTERNAL MEDICINE

## 2024-12-24 PROCEDURE — 2500000003 HC RX 250 WO HCPCS

## 2024-12-24 PROCEDURE — 85014 HEMATOCRIT: CPT

## 2024-12-24 PROCEDURE — 36415 COLL VENOUS BLD VENIPUNCTURE: CPT

## 2024-12-24 PROCEDURE — 6370000000 HC RX 637 (ALT 250 FOR IP): Performed by: STUDENT IN AN ORGANIZED HEALTH CARE EDUCATION/TRAINING PROGRAM

## 2024-12-24 PROCEDURE — 99232 SBSQ HOSP IP/OBS MODERATE 35: CPT | Performed by: INTERNAL MEDICINE

## 2024-12-24 PROCEDURE — 6370000000 HC RX 637 (ALT 250 FOR IP)

## 2024-12-24 PROCEDURE — 6360000002 HC RX W HCPCS

## 2024-12-24 RX ORDER — DOCUSATE SODIUM 100 MG/1
100 CAPSULE, LIQUID FILLED ORAL 2 TIMES DAILY PRN
Qty: 60 CAPSULE | Refills: 0 | Status: ON HOLD | OUTPATIENT
Start: 2024-12-24 | End: 2025-01-23

## 2024-12-24 RX ORDER — ONDANSETRON 4 MG/1
4 TABLET, ORALLY DISINTEGRATING ORAL EVERY 8 HOURS PRN
Qty: 30 TABLET | Refills: 0 | Status: ON HOLD | OUTPATIENT
Start: 2024-12-24

## 2024-12-24 RX ORDER — POLYETHYLENE GLYCOL 3350 17 G/17G
34 POWDER, FOR SOLUTION ORAL ONCE
Status: COMPLETED | OUTPATIENT
Start: 2024-12-24 | End: 2024-12-24

## 2024-12-24 RX ORDER — AMLODIPINE BESYLATE 2.5 MG/1
2.5 TABLET ORAL DAILY
Qty: 30 TABLET | Refills: 3 | Status: ON HOLD | OUTPATIENT
Start: 2024-12-25

## 2024-12-24 RX ADMIN — HYDROMORPHONE HYDROCHLORIDE 1 MG: 1 INJECTION, SOLUTION INTRAMUSCULAR; INTRAVENOUS; SUBCUTANEOUS at 04:58

## 2024-12-24 RX ADMIN — MAGNESIUM HYDROXIDE 30 ML: 400 SUSPENSION ORAL at 09:55

## 2024-12-24 RX ADMIN — CEFEPIME 2000 MG: 2 INJECTION, POWDER, FOR SOLUTION INTRAVENOUS at 00:51

## 2024-12-24 RX ADMIN — AMLODIPINE BESYLATE 2.5 MG: 2.5 TABLET ORAL at 07:47

## 2024-12-24 RX ADMIN — POLYETHYLENE GLYCOL 3350 17 G: 17 POWDER, FOR SOLUTION ORAL at 07:47

## 2024-12-24 RX ADMIN — POLYETHYLENE GLYCOL 3350 34 G: 17 POWDER, FOR SOLUTION ORAL at 09:55

## 2024-12-24 RX ADMIN — SODIUM CHLORIDE, PRESERVATIVE FREE 10 ML: 5 INJECTION INTRAVENOUS at 07:49

## 2024-12-24 ASSESSMENT — PAIN DESCRIPTION - DESCRIPTORS: DESCRIPTORS: ACHING

## 2024-12-24 ASSESSMENT — ENCOUNTER SYMPTOMS
COUGH: 0
SHORTNESS OF BREATH: 0
BACK PAIN: 0
NAUSEA: 0
WHEEZING: 0
CONSTIPATION: 0
ABDOMINAL PAIN: 0
DIARRHEA: 0
VOMITING: 0
ALLERGIC/IMMUNOLOGIC NEGATIVE: 1

## 2024-12-24 ASSESSMENT — PAIN SCALES - GENERAL: PAINLEVEL_OUTOF10: 7

## 2024-12-24 ASSESSMENT — PAIN - FUNCTIONAL ASSESSMENT: PAIN_FUNCTIONAL_ASSESSMENT: ACTIVITIES ARE NOT PREVENTED

## 2024-12-24 ASSESSMENT — PAIN DESCRIPTION - ORIENTATION: ORIENTATION: LEFT

## 2024-12-24 ASSESSMENT — PAIN DESCRIPTION - LOCATION: LOCATION: BACK;NECK

## 2024-12-24 NOTE — DISCHARGE INSTR - DIET

## 2024-12-24 NOTE — DISCHARGE INSTR - ACTIVITY
Left cervical lymph node biopsy per IR yesterday with pathology present reporting adequate specimen, follow up finally results for any need for further tissue sample. IR biopsy site with continued oozing, single stitch placed to help with hemostasis, dressing applied. Pt to remove dressing 12/27 and wash BID with soap and water there after. Follow up as out patient for biopsy site monitoring and removal of suture in 7-10 days.   HIDA revealed no acute cholecystitis, lower end EF which can be seen in biliary dyskinesia and can be addressed as an outpatient  DC planning: ok for d/c from gen surg stance. Recommended to pt to continue a bowel regimen at home given her significant stool burden and chronic constipation

## 2024-12-24 NOTE — PROGRESS NOTES
[facility] Internal Medicine  Pk Woodward MD; Gautam Jay MD; Hong Woo MD; MD Libby Stevens MD; Baljit Ross MD; Beryl Dow MD; Edita Hawthorne MD    [facility] Internal Medicine   IN-PATIENT SERVICE   [facility]    HISTORY AND PHYSICAL EXAMINATION            Date:   2024  Patient name:  Szuan Garcia  Date of admission:  2024  5:06 PM  MRN:   [id number]  Account:  [id number]  YOB: 1965  PCP:    No primary care provider on file.  Room:     Code Status:    Full Code    Chief Complaint:     Chief Complaint   Patient presents with    Back Pain    Abdominal Pain    Nausea        History Obtained From:     patient, electronic medical record    History of Present Illness:     Suzan Garcia is a 59 y.o.  /  female who presents with Back Pain, Abdominal Pain, and Nausea   and is admitted to the hospital for the management of Cholelithiasis without cholangitis.  History was taken with daughter as their  on phone.  Patient did not want to use the  as the  was not nice as per the patient.  Patient admitted in the hospital after she was complaining of abdominal pain and nausea and back pain for the past couple of days.  Was found to have cholelithiasis and elevated ALP.  Patient also noted to have a spot on the left lower lobe, concerns of hemangioma and lymphadenopathy.  General surgery, GI and heme-onc was consulted from the ED.    Past Medical History:     Past Medical History:   Diagnosis Date    Chronic back pain         Past Surgical History:     Past Surgical History:   Procedure Laterality Date     SECTION          Medications Prior to Admission:     Prior to Admission medications    Medication Sig Start Date End Date Taking? Authorizing Provider   lidocaine (LIDODERM) 5 % Place 1 patch onto the skin every 24 hours Place 1 patch onto 
     [facility] Internal Medicine  Pk Woodward MD; Gautam Jay MD; Hong Woo MD; MD Libby Stevens MD; Baljit Ross MD; Beryl Dow MD; Edita Hawthorne MD    [facility] Internal Medicine   IN-PATIENT SERVICE   [facility]    HISTORY AND PHYSICAL EXAMINATION            Date:   2024  Patient name:  Suzan Garcia  Date of admission:  2024  5:06 PM  MRN:   [id number]  Account:  [id number]  YOB: 1965  PCP:    No primary care provider on file.  Room:     Code Status:    Full Code    Chief Complaint:     Chief Complaint   Patient presents with    Back Pain    Abdominal Pain    Nausea        History Obtained From:     patient, electronic medical record    History of Present Illness:     Suzan Garcia is a 59 y.o.  /  female who presents with Back Pain, Abdominal Pain, and Nausea   and is admitted to the hospital for the management of Cholelithiasis without cholangitis.  History was taken with daughter as their  on phone.  Patient did not want to use the  as the  was not nice as per the patient.  Patient admitted in the hospital after she was complaining of abdominal pain and nausea and back pain for the past couple of days.  Was found to have cholelithiasis and elevated ALP.  Patient also noted to have a spot on the left lower lobe, concerns of hemangioma and lymphadenopathy.  General surgery, GI and heme-onc was consulted from the ED.    Past Medical History:     Past Medical History:   Diagnosis Date    Chronic back pain         Past Surgical History:     Past Surgical History:   Procedure Laterality Date     SECTION          Medications Prior to Admission:     Prior to Admission medications    Medication Sig Start Date End Date Taking? Authorizing Provider   lidocaine (LIDODERM) 5 % Place 1 patch onto the skin every 24 hours Place 1 patch onto 
    Today's Date: 2024  Patient Name: Suzan Garcia  Date of admission: 2024  5:06 PM  Patient's age: 59 y.o., 1965  Admission Dx: Biliary colic [K80.50]  Gallstones [K80.20]  Cholelithiasis without cholangitis [K80.20]    Reason for Consult: lymphoma   Requesting Physician: Edita Hawthorne MD    CHIEF COMPLAINT:    Chief Complaint   Patient presents with    Back Pain    Abdominal Pain    Nausea     History Obtained From:  patient and chart  INTERVAL HISTORY:    Patient seen and examined  Patient had HIDA scan today  Requested excisional biopsy of the cervical lymph node with general surgery   Patient had corneal biopsy of the cervical lymph node today  Complains of abdominal pain and mild nausea today  HISTORY OF PRESENT ILLNESS:    Suzan Garcia is a 59 y.o. female who is admitted to the hospital on 2024  for back pain, abdominal pain nausea  Patient had CT abdomen pelvis which showed no acute intra-abdominal or intrapelvic abnormality.  Mild hepatomegaly noted.  Nonspecific low-density lesion in the left hepatic lobe measuring 15 mm possible hemangioma and multiple nonspecific mesenteric and pelvic, inguinal, periaortic lymph nodes as well as partially visualized lower axillary lymph node noted.    Her prior CTs chest on 2024 did show mediastinal, hilar and axillary adenopathy.  Hematology consulted for evaluation of her lymphadenopathy.  Past Medical History:   has a past medical history of Chronic back pain.    Past Surgical History:   has a past surgical history that includes  section.     Medications:    Prior to Admission medications    Medication Sig Start Date End Date Taking? Authorizing Provider   lidocaine (LIDODERM) 5 % Place 1 patch onto the skin every 24 hours Place 1 patch onto the skin daily 12 hours on, 12 hours off.  Patient not taking: Reported on 2024 12/16/24 1/15/25  Alan Duarte MD   cyclobenzaprine (FLEXERIL) 10 MG tablet Take 
    [facility]   Gastroenterology Progress Note    Suzan Garcia is a 59 y.o. female patient.  Hospitalization Day:3      Chief consult reason:   Biliary colic    Subjective:  Pt seen and examined. No acute events overnight.   Pain has resolved-now just feeling gassy from laxatives, Miralax      VITALS:  BP (!) 150/85   Pulse 64   Temp 98.4 °F (36.9 °C)   Resp 17   Ht 1.524 m (5')   Wt 72.6 kg (160 lb)   LMP 10/13/2015 (Approximate) Comment: possible menopause  SpO2 99%   BMI 31.25 kg/m²   TEMPERATURE:  Current - Temp: 98.4 °F (36.9 °C); Max - Temp  Av °F (36.7 °C)  Min: 97.4 °F (36.3 °C)  Max: 98.4 °F (36.9 °C)    Physical Assessment:  General appearance:  alert, cooperative and no distress  Mental Status:  oriented to person, place and time and normal affect  Lungs:  clear to auscultation bilaterally, normal effort  Heart:  regular rate and rhythm, no murmur  Abdomen:  soft, nontender, nondistended, normal bowel sounds, no masses, hepatomegaly, splenomegaly  Extremities:  no edema, redness, tenderness in the calves  Skin:  no gross lesions, rashes, induration    Data Review:    Labs and Imaging:       CBC:  Recent Labs     24  1815 24  0703 24  0628 24  2306 24  0427 24  1016   WBC 8.7 7.8 8.1  --  8.3  --    HGB 13.3 13.1 12.5 12.4 12.4 11.9*   MCV 82.5 84.3 83.3  --  82.9  --    RDW 14.5 14.7 14.6  --  14.6  --    * 135* 149*  --  172  --        ANEMIA STUDIES:  Recent Labs     24  1542   [id number] >2000*   FOLATE 24.1       BMP:  Recent Labs     24  1815 24  0703 24  0628 24  0427   * 136 137 137   K 3.8 4.2 4.1 4.1   CL 98 102 101 102   CO2 26 26 27 26   BUN 9 9 11 14   CREATININE 0.6* 0.7 0.8 0.7   GLUCOSE 133* 113* 106* 145*   CALCIUM 9.3 8.3* 8.7 8.7   MG 2.2  --   --   --        LFTS:  Recent Labs     24  1815 24  0628 24  0427   ALKPHOS 124* 111* 105*   ALT 29 35 41*   AST 
Comprehensive Nutrition Assessment    Type and Reason for Visit:  Positive nutrition screen (wt loss, poor appetite)    Nutrition Recommendations/Plan:   Will add Ensure Clear to Clear Liquid diet      Malnutrition Assessment:  Malnutrition Status:  At risk for malnutrition (12/22/24 2975)    Context:  Acute Illness     Findings of the 6 clinical characteristics of malnutrition:  Energy Intake:  75% or less of estimated energy requirements for 7 or more days  Weight Loss:  Unable to assess (no recent wt history)     Body Fat Loss:  Unable to assess     Muscle Mass Loss:  Unable to assess    Fluid Accumulation:  No fluid accumulation     Strength:  Not Performed    Nutrition Assessment:    Pt admitted due to Biliary Colic/R/O Hepatic Lesion/Chronic Constipation. Pt has had a poor appetite with N/V x 1 week. Bed scale wt suggests wt loss prior to admit.    Nutrition Related Findings:    no edema, Labs/Meds: Reviewed Wound Type: None       Current Nutrition Intake & Therapies:    Average Meal Intake: 26-50%     ADULT DIET; Clear Liquid    Anthropometric Measures:  Height: 152.4 cm (5')  Ideal Body Weight (IBW): 100 lbs (45 kg)    Admission Body Weight: 72.6 kg (160 lb)  Current Body Weight: 65.5 kg (144 lb 8 oz) (obtained by RD), 144.5 % IBW. Weight Source: Bed scale  Current BMI (kg/m2): 28.2  Usual Body Weight:  (155-160 #)                          BMI Categories: Overweight (BMI 25.0-29.9)    Estimated Daily Nutrient Needs:  Energy Requirements Based On: Formula  Weight Used for Energy Requirements: Current  Energy (kcal/day): [place] x 1.2= 1400 kcal  Weight Used for Protein Requirements: Current  Protein (g/day): 1.5g/kg=  g  Method Used for Fluid Requirements: 1 ml/kcal  Fluid (ml/day): 1400 ml    Nutrition Diagnosis:   Inadequate oral intake related to altered GI function, decreased appetite as evidenced by nausea, vomiting    Nutrition Interventions:   Food and/or Nutrient Delivery: Continue Current 
General Surgery:  Daily Progress Note                  PATIENT NAME: Suzan Garcia   TODAY'S DATE: 12/23/2024, 7:19 AM    SUBJECTIVE:     Pt seen and examined at bedside.  used. Pt states overall pain is ok, still some left chest and back pain with a little bit of cramping in her lower pelvis. Denies  fever, chills, nausea, vomiting, chest pain, and SOB.  Having lots of flatus but no Bms yet. Had bowel regimen and suppository yesterday without issues. We discussed that she has a HIDA scanned planned for today and possible IR biopsy for her lymph node. We discussed that if IR is unable to perform biopsy then we would wait for results of HIDA to confirm there are no other potential procedures she would need to have done prior to going to the OR for an open cervical lymph node biopsy in the next couple days vs as an out pt.       Tmax 36.8    OBJECTIVE:   VITALS:  BP (!) 154/88   Pulse 64   Temp 97.7 °F (36.5 °C) (Oral)   Resp 17   Ht 1.524 m (5')   Wt 72.6 kg (160 lb)   LMP 10/13/2015 (Approximate) Comment: possible menopause  SpO2 99%   BMI 31.25 kg/m²      INTAKE/OUTPUT:      Intake/Output Summary (Last 24 hours) at 12/23/2024 0719  Last data filed at 12/22/2024 1945  Gross per 24 hour   Intake 798 ml   Output 200 ml   Net 598 ml       PHYSICAL EXAM:  General Appearance:  awake, alert, oriented, in no acute distress  HEENT:  Normocephalic, atraumatic, mucus membranes moist, left mid posterior cervical lymph node noted, non tender to touch    Heart: Heart regular rate and rhythm  Lungs: equal chest rise and fall, no rebound or guarding   Abdomen: soft, non tender, no rebound, no guarding   Extremities: No cyanosis, pitting edema, rashes noted.    Skin: Skin color, texture, turgor normal. No rashes or lesions.    Data:  CBC with Differential:    Lab Results   Component Value Date/Time    WBC 8.1 12/23/2024 06:28 AM    RBC 4.51 12/23/2024 06:28 AM    HGB 12.5 12/23/2024 06:28 AM    HCT 
General Surgery:  Daily Progress Note                  PATIENT NAME: Suzan Garcia   TODAY'S DATE: 12/24/2024, 7:04 AM    SUBJECTIVE:     Pt seen and examined at bedside. Pt states pain has improved, still having some lower pelvic cramping. Has not had a proper BM just lots of flatus.   Denies  fever, chills, nausea, vomiting, chest pain, and SOB.  Tolerating diet.   Encouraged activity/up in chair.  Discussed with the pt and her daughter the HIDA results. Questions answered.      Tmax 36.9    OBJECTIVE:   VITALS:  BP (!) 150/85   Pulse 64   Temp 98.4 °F (36.9 °C)   Resp 17   Ht 1.524 m (5')   Wt 72.6 kg (160 lb)   LMP 10/13/2015 (Approximate) Comment: possible menopause  SpO2 99%   BMI 31.25 kg/m²      INTAKE/OUTPUT:    No intake or output data in the 24 hours ending 12/24/24 0704    PHYSICAL EXAM:  General Appearance:  awake, alert, oriented, in no acute distress  HEENT:  Normocephalic, atraumatic, mucus membranes moist, left mid posterior cervical lymph node biopsy site with oozing noted, single 3-0 vicryl suture placed, hemostasis achieved     Heart: Heart regular rate and rhythm  Lungs: equal chest rise and fall, no rebound or guarding   Abdomen: soft, non tender, no rebound, no guarding   Extremities: No cyanosis, pitting edema, rashes noted.    Skin: Skin color, texture, turgor normal. No rashes or lesions    Data:  CBC:   Lab Results   Component Value Date/Time    WBC 8.3 12/24/2024 04:27 AM    RBC 4.47 12/24/2024 04:27 AM    HGB 12.4 12/24/2024 04:27 AM    HCT 37.0 12/24/2024 04:27 AM    MCV 82.9 12/24/2024 04:27 AM    MCH 27.6 12/24/2024 04:27 AM    MCHC 33.4 12/24/2024 04:27 AM    RDW 14.6 12/24/2024 04:27 AM     12/24/2024 04:27 AM    MPV 7.4 12/24/2024 04:27 AM     CMP:    Lab Results   Component Value Date/Time     12/24/2024 04:27 AM    K 4.1 12/24/2024 04:27 AM     12/24/2024 04:27 AM    CO2 26 12/24/2024 04:27 AM    BUN 14 12/24/2024 04:27 AM    CREATININE 0.7 
Patient off unit for tissue biopsy  Electronically signed by YULI Thomas NP on 12/23/2024 at 11:29 AM    
Patients dtr Abimbola at pts bedside translating for pt. Dr Puente with General surgeon speaking with both pt and pts daughter. Patient chooses to use daughter for translation at this time.   
Pt woke up in severe pain. Vita NP called and came to bedside.  to interrupt.   
Soap suds enema complete   
Writer inform pt and the family that since the pt doesn't want to us the interpretor it is okay for the family to interpret for the pt, but if the Dr. need to explain a procedure to the pt we will need to use the  interpretor. Pt and the pt's family stated they understood.   
Writer perfect serve Melissa MAN to inform her that pt bx site on her neck is still bleeding after a new dressing and ice was apply. NP reply ro reach out to gen surg for suggestions.     19:37: Writer reach out to Dr Puente to inform him of the situation.  stated to put steri- strip, fold a 4x4 multiple times and tape it down tightly and then have pt apply light pressure.    22:13: Writer inform Dr Puente that the dressing is saturated and will change dressing.  reply to have a laceration kit and suture available for him.      00:28: Writer inform  that dressing was saturated again and will be redressed.     
Cells, UA 0 TO 2 /HPF    Bacteria, UA None None       Imaging/Diagnostics:  CT ABDOMEN PELVIS W IV CONTRAST Additional Contrast? None    Result Date: 12/21/2024  1. No acute intra-abdominal or intrapelvic abnormalities. 2. Mild hepatomegaly. 3. Nonspecific low-density lesion in the left hepatic lobe measuring 15 mm, possibly a hemangioma.  Recommend nonemergent outpatient correlation with sonography. 4. Partially visualize lymph nodes in the lower axillary region as demonstrated on previous chest CT.  Multiple nonspecific mesenteric, pelvic, inguinal and periaortic lymph nodes measuring up to 15 mm.  PET scan should be considered.     CT CHEST PULMONARY EMBOLISM W CONTRAST    Result Date: 12/16/2024  1. No pulmonary emboli. 2. No acute pulmonary process. 3. Mediastinal, hilar, and axillary lymphadenopathy. This is nonspecific, and could be seen with lymphoma or metastatic disease. 4. Nonspecific low-attenuation lesion in the left hepatic lobe measuring 18 x 20 mm in size. This could be further evaluated with MRI of the abdomen with and without contrast.  This may represent a mildly complex cyst or hemangioma, though a neoplastic process is in the differential.     XR CHEST PORTABLE    Result Date: 12/16/2024  No acute parenchymal infiltrate.         Plan:     Patient status inpatient in the Med/Surge          Vita Ferreira, APRN - NP  12/21/2024  9:09 PM      Please note that this chart was generated using voice recognition Dragon dictation software.  Although every effort was made to ensure the accuracy of this automated transcription, some errors in transcription [place] [address].   Phone [phone]

## 2024-12-24 NOTE — DISCHARGE INSTR - COC
Continuity of Care Form    Patient Name: Suzan Cook   :  1965  MRN:  [id number]    Admit date:  2024  Discharge date:  ***    Code Status Order: Full Code   Advance Directives:   Advance Care Flowsheet Documentation             Admitting Physician:  Edita Hawthorne MD  PCP: No primary care provider on file.    Discharging Nurse: ***  Discharging Hospital Unit/Room#: 2049/2049-01  Discharging Unit Phone Number: ***    Emergency Contact:   Extended Emergency Contact Information  Primary Emergency Contact: Freedom Brown  Address: [address] [place] [address]  Home Phone: [phone]  Relation: Spouse  Secondary Emergency Contact: leobardo cooka  Work Phone: [phone]  Mobile Phone: [phone]  Relation: Child    Past Surgical History:  Past Surgical History:   Procedure Laterality Date     SECTION         Immunization History:   Immunization History   Administered Date(s) Administered    COVID-19, MODERNA, (age 12y+), IM, 50mcg/0.5mL 10/28/2023       Active Problems:  Patient Active Problem List   Diagnosis Code    ASCUS with positive high risk HPV cervical R87.610, R87.810    Screening mammogram for breast cancer Z12.31    Sleep disturbance G47.9    Cholelithiasis without cholangitis K80.20    Biliary colic K80.50    Cervical lymphadenopathy R59.0    Lymphadenopathy, generalized R59.1    Thrombocytopenia (HCC) D69.6       Isolation/Infection:   Isolation            No Isolation          Patient Infection Status       None to display            Nurse Assessment:  Last Vital Signs: BP (!) 150/85   Pulse 64   Temp 98.4 °F (36.9 °C)   Resp 17   Ht 1.524 m (5')   Wt 72.6 kg (160 lb)   LMP 10/13/2015 (Approximate) Comment: possible menopause  SpO2 99%   BMI 31.25 kg/m²     Last documented pain score (0-10 scale): Pain Level: 7  Last Weight:   Wt Readings from Last 1 Encounters:   24 72.6 kg (160 lb)     Mental Status:  {IP PT MENTAL STATUS:83837}    IV

## 2024-12-24 NOTE — CARE COORDINATION
ONGOING DISCHARGE PLAN:    Review of chart.     Front-wheeled walker delivered to bedside by [facility] on 12/23.     HIDA scan completed, no evidence of cholecystitis. Awaiting GI recommendations.    POD #1 lymph node biopsy in IR. Oncology on board.     Will continue to follow for additional discharge needs.    If patient is discharged prior to next notation, then this note serves as note for discharge by case management.    Electronically signed by Kristin Navarro RN on 12/24/2024 at 11:16 AM

## 2024-12-25 LAB — FLOW CYTOMETRY BL: NORMAL

## 2024-12-26 ENCOUNTER — HOSPITAL ENCOUNTER (INPATIENT)
Age: 59
LOS: 8 days | Discharge: NURSING, REHAB OR LONG-TERM CARE | End: 2025-01-03
Attending: EMERGENCY MEDICINE | Admitting: INTERNAL MEDICINE
Payer: COMMERCIAL

## 2024-12-26 ENCOUNTER — APPOINTMENT (OUTPATIENT)
Dept: CT IMAGING | Age: 59
End: 2024-12-26
Payer: COMMERCIAL

## 2024-12-26 ENCOUNTER — APPOINTMENT (OUTPATIENT)
Dept: MRI IMAGING | Age: 59
End: 2024-12-26
Payer: COMMERCIAL

## 2024-12-26 DIAGNOSIS — R29.810 FACIAL DROOP: Primary | ICD-10-CM

## 2024-12-26 DIAGNOSIS — G89.29 CHRONIC BILATERAL LOW BACK PAIN, UNSPECIFIED WHETHER SCIATICA PRESENT: ICD-10-CM

## 2024-12-26 DIAGNOSIS — R29.90 STROKE-LIKE SYMPTOMS: ICD-10-CM

## 2024-12-26 DIAGNOSIS — G61.0 GUILLAIN BARRÉ SYNDROME (HCC): ICD-10-CM

## 2024-12-26 DIAGNOSIS — I63.9 ACUTE ISCHEMIC STROKE (HCC): ICD-10-CM

## 2024-12-26 DIAGNOSIS — M54.50 CHRONIC BILATERAL LOW BACK PAIN, UNSPECIFIED WHETHER SCIATICA PRESENT: ICD-10-CM

## 2024-12-26 DIAGNOSIS — R29.898 LEG WEAKNESS, BILATERAL: ICD-10-CM

## 2024-12-26 LAB
ABSOLUTE BANDS: 0.36 K/UL (ref 0–1)
ALBUMIN SERPL-MCNC: 4 G/DL (ref 3.5–5.2)
ALP SERPL-CCNC: 106 U/L (ref 35–104)
ALT SERPL-CCNC: 61 U/L (ref 10–35)
ANION GAP SERPL CALCULATED.3IONS-SCNC: 9 MMOL/L (ref 9–16)
AST SERPL-CCNC: 33 U/L (ref 10–35)
BANDS: 4 % (ref 0–10)
BASOPHILS # BLD: 0.09 K/UL (ref 0–0.2)
BASOPHILS NFR BLD: 1 % (ref 0–2)
BILIRUB SERPL-MCNC: 1.3 MG/DL (ref 0–1.2)
BUN SERPL-MCNC: 17 MG/DL (ref 6–20)
CALCIUM SERPL-MCNC: 9.2 MG/DL (ref 8.6–10.4)
CHLORIDE SERPL-SCNC: 102 MMOL/L (ref 98–107)
CHOLEST SERPL-MCNC: 148 MG/DL (ref 0–199)
CHOLESTEROL/HDL RATIO: 7.4
CO2 SERPL-SCNC: 25 MMOL/L (ref 20–31)
CREAT SERPL-MCNC: 0.8 MG/DL (ref 0.7–1.2)
CRP SERPL HS-MCNC: 4.6 MG/L (ref 0–5)
EOSINOPHIL # BLD: 1.09 K/UL (ref 0–0.4)
EOSINOPHILS RELATIVE PERCENT: 12 % (ref 0–4)
ERYTHROCYTE [DISTWIDTH] IN BLOOD BY AUTOMATED COUNT: 14.6 % (ref 11.5–14.9)
ERYTHROCYTE [SEDIMENTATION RATE] IN BLOOD BY PHOTOMETRIC METHOD: 48 MM/HR (ref 0–30)
GFR, ESTIMATED: 85 ML/MIN/1.73M2
GLUCOSE SERPL-MCNC: 129 MG/DL (ref 74–99)
HCT VFR BLD AUTO: 38.4 % (ref 36–46)
HDLC SERPL-MCNC: 20 MG/DL
HGB BLD-MCNC: 12.8 G/DL (ref 12–16)
INR PPP: 1.1
LACTATE BLDV-SCNC: 0.8 MMOL/L (ref 0.5–1.9)
LDLC SERPL CALC-MCNC: 90 MG/DL (ref 0–100)
LYMPHOCYTES NFR BLD: 2.28 K/UL (ref 1–4.8)
LYMPHOCYTES RELATIVE PERCENT: 25 % (ref 24–44)
MCH RBC QN AUTO: 27.6 PG (ref 26–34)
MCHC RBC AUTO-ENTMCNC: 33.3 G/DL (ref 31–37)
MCV RBC AUTO: 83 FL (ref 80–100)
MONOCYTES NFR BLD: 1.09 K/UL (ref 0.1–1.3)
MONOCYTES NFR BLD: 12 % (ref 1–7)
MORPHOLOGY: ABNORMAL
MORPHOLOGY: ABNORMAL
NEUTROPHILS NFR BLD: 46 % (ref 36–66)
NEUTS SEG NFR BLD: 4.19 K/UL (ref 1.3–9.1)
PLATELET # BLD AUTO: 258 K/UL (ref 150–450)
PMV BLD AUTO: 7.4 FL (ref 6–12)
POTASSIUM SERPL-SCNC: 4.1 MMOL/L (ref 3.7–5.3)
PROT SERPL-MCNC: 9 G/DL (ref 6.6–8.7)
PROTHROMBIN TIME: 14.2 SEC (ref 11.8–14.6)
RBC # BLD AUTO: 4.63 M/UL (ref 4–5.2)
SODIUM SERPL-SCNC: 136 MMOL/L (ref 136–145)
SURGICAL PATHOLOGY REPORT: NORMAL
SURGICAL PATHOLOGY REPORT: NORMAL
TRIGL SERPL-MCNC: 188 MG/DL (ref 0–149)
TROPONIN I SERPL HS-MCNC: 19 NG/L (ref 0–14)
TROPONIN I SERPL HS-MCNC: 26 NG/L (ref 0–14)
TSH SERPL DL<=0.05 MIU/L-ACNC: 1.84 UIU/ML (ref 0.27–4.2)
WBC OTHER # BLD: 9.1 K/UL (ref 3.5–11)

## 2024-12-26 PROCEDURE — 87040 BLOOD CULTURE FOR BACTERIA: CPT

## 2024-12-26 PROCEDURE — 82947 ASSAY GLUCOSE BLOOD QUANT: CPT

## 2024-12-26 PROCEDURE — 85652 RBC SED RATE AUTOMATED: CPT

## 2024-12-26 PROCEDURE — 6370000000 HC RX 637 (ALT 250 FOR IP): Performed by: EMERGENCY MEDICINE

## 2024-12-26 PROCEDURE — 85610 PROTHROMBIN TIME: CPT

## 2024-12-26 PROCEDURE — 6360000002 HC RX W HCPCS

## 2024-12-26 PROCEDURE — 70498 CT ANGIOGRAPHY NECK: CPT

## 2024-12-26 PROCEDURE — 84443 ASSAY THYROID STIM HORMONE: CPT

## 2024-12-26 PROCEDURE — 86140 C-REACTIVE PROTEIN: CPT

## 2024-12-26 PROCEDURE — 99223 1ST HOSP IP/OBS HIGH 75: CPT | Performed by: INTERNAL MEDICINE

## 2024-12-26 PROCEDURE — 2060000000 HC ICU INTERMEDIATE R&B

## 2024-12-26 PROCEDURE — 99285 EMERGENCY DEPT VISIT HI MDM: CPT

## 2024-12-26 PROCEDURE — 83605 ASSAY OF LACTIC ACID: CPT

## 2024-12-26 PROCEDURE — 2500000003 HC RX 250 WO HCPCS

## 2024-12-26 PROCEDURE — 85025 COMPLETE CBC W/AUTO DIFF WBC: CPT

## 2024-12-26 PROCEDURE — 80053 COMPREHEN METABOLIC PANEL: CPT

## 2024-12-26 PROCEDURE — 83036 HEMOGLOBIN GLYCOSYLATED A1C: CPT

## 2024-12-26 PROCEDURE — 6360000004 HC RX CONTRAST MEDICATION: Performed by: EMERGENCY MEDICINE

## 2024-12-26 PROCEDURE — 36415 COLL VENOUS BLD VENIPUNCTURE: CPT

## 2024-12-26 PROCEDURE — 2500000003 HC RX 250 WO HCPCS: Performed by: EMERGENCY MEDICINE

## 2024-12-26 PROCEDURE — 70551 MRI BRAIN STEM W/O DYE: CPT

## 2024-12-26 PROCEDURE — 72146 MRI CHEST SPINE W/O DYE: CPT

## 2024-12-26 PROCEDURE — 80061 LIPID PANEL: CPT

## 2024-12-26 PROCEDURE — 2580000003 HC RX 258: Performed by: EMERGENCY MEDICINE

## 2024-12-26 PROCEDURE — 72141 MRI NECK SPINE W/O DYE: CPT

## 2024-12-26 PROCEDURE — 2580000003 HC RX 258

## 2024-12-26 PROCEDURE — 72148 MRI LUMBAR SPINE W/O DYE: CPT

## 2024-12-26 PROCEDURE — 93005 ELECTROCARDIOGRAM TRACING: CPT | Performed by: EMERGENCY MEDICINE

## 2024-12-26 PROCEDURE — 84484 ASSAY OF TROPONIN QUANT: CPT

## 2024-12-26 PROCEDURE — 70450 CT HEAD/BRAIN W/O DYE: CPT

## 2024-12-26 RX ORDER — ENOXAPARIN SODIUM 100 MG/ML
40 INJECTION SUBCUTANEOUS DAILY
Status: DISCONTINUED | OUTPATIENT
Start: 2024-12-26 | End: 2025-01-03 | Stop reason: HOSPADM

## 2024-12-26 RX ORDER — ACETAMINOPHEN 325 MG/1
650 TABLET ORAL EVERY 6 HOURS PRN
Status: DISCONTINUED | OUTPATIENT
Start: 2024-12-26 | End: 2025-01-02

## 2024-12-26 RX ORDER — SODIUM CHLORIDE 0.9 % (FLUSH) 0.9 %
5-40 SYRINGE (ML) INJECTION EVERY 12 HOURS SCHEDULED
Status: DISCONTINUED | OUTPATIENT
Start: 2024-12-26 | End: 2025-01-03 | Stop reason: HOSPADM

## 2024-12-26 RX ORDER — SODIUM CHLORIDE 9 MG/ML
INJECTION, SOLUTION INTRAVENOUS CONTINUOUS
Status: DISCONTINUED | OUTPATIENT
Start: 2024-12-26 | End: 2024-12-27

## 2024-12-26 RX ORDER — SODIUM CHLORIDE 0.9 % (FLUSH) 0.9 %
5-40 SYRINGE (ML) INJECTION PRN
Status: DISCONTINUED | OUTPATIENT
Start: 2024-12-26 | End: 2025-01-03 | Stop reason: HOSPADM

## 2024-12-26 RX ORDER — POTASSIUM CHLORIDE 7.45 MG/ML
10 INJECTION INTRAVENOUS PRN
Status: DISCONTINUED | OUTPATIENT
Start: 2024-12-26 | End: 2025-01-03 | Stop reason: HOSPADM

## 2024-12-26 RX ORDER — CLOPIDOGREL BISULFATE 75 MG/1
75 TABLET ORAL DAILY
Status: DISCONTINUED | OUTPATIENT
Start: 2024-12-27 | End: 2024-12-27

## 2024-12-26 RX ORDER — ASPIRIN 81 MG/1
324 TABLET, CHEWABLE ORAL ONCE
Status: COMPLETED | OUTPATIENT
Start: 2024-12-26 | End: 2024-12-26

## 2024-12-26 RX ORDER — 0.9 % SODIUM CHLORIDE 0.9 %
100 INTRAVENOUS SOLUTION INTRAVENOUS ONCE
Status: COMPLETED | OUTPATIENT
Start: 2024-12-26 | End: 2024-12-26

## 2024-12-26 RX ORDER — ASPIRIN 81 MG/1
81 TABLET, CHEWABLE ORAL DAILY
Status: DISCONTINUED | OUTPATIENT
Start: 2024-12-27 | End: 2024-12-27

## 2024-12-26 RX ORDER — IOPAMIDOL 755 MG/ML
75 INJECTION, SOLUTION INTRAVASCULAR
Status: COMPLETED | OUTPATIENT
Start: 2024-12-26 | End: 2024-12-26

## 2024-12-26 RX ORDER — SODIUM CHLORIDE 0.9 % (FLUSH) 0.9 %
10 SYRINGE (ML) INJECTION PRN
Status: DISCONTINUED | OUTPATIENT
Start: 2024-12-26 | End: 2025-01-03 | Stop reason: HOSPADM

## 2024-12-26 RX ORDER — HYDRALAZINE HYDROCHLORIDE 20 MG/ML
10 INJECTION INTRAMUSCULAR; INTRAVENOUS EVERY 6 HOURS PRN
Status: DISCONTINUED | OUTPATIENT
Start: 2024-12-26 | End: 2025-01-03 | Stop reason: HOSPADM

## 2024-12-26 RX ORDER — MAGNESIUM SULFATE HEPTAHYDRATE 40 MG/ML
2000 INJECTION, SOLUTION INTRAVENOUS PRN
Status: DISCONTINUED | OUTPATIENT
Start: 2024-12-26 | End: 2025-01-03 | Stop reason: HOSPADM

## 2024-12-26 RX ORDER — AMLODIPINE BESYLATE 2.5 MG/1
2.5 TABLET ORAL DAILY
Status: DISCONTINUED | OUTPATIENT
Start: 2024-12-26 | End: 2025-01-03 | Stop reason: HOSPADM

## 2024-12-26 RX ORDER — POTASSIUM CHLORIDE 1500 MG/1
40 TABLET, EXTENDED RELEASE ORAL PRN
Status: DISCONTINUED | OUTPATIENT
Start: 2024-12-26 | End: 2025-01-03 | Stop reason: HOSPADM

## 2024-12-26 RX ORDER — ONDANSETRON 2 MG/ML
4 INJECTION INTRAMUSCULAR; INTRAVENOUS EVERY 6 HOURS PRN
Status: DISCONTINUED | OUTPATIENT
Start: 2024-12-26 | End: 2025-01-03 | Stop reason: HOSPADM

## 2024-12-26 RX ORDER — ROSUVASTATIN CALCIUM 40 MG/1
40 TABLET, COATED ORAL NIGHTLY
Status: DISCONTINUED | OUTPATIENT
Start: 2024-12-26 | End: 2025-01-03 | Stop reason: HOSPADM

## 2024-12-26 RX ORDER — SODIUM CHLORIDE 9 MG/ML
INJECTION, SOLUTION INTRAVENOUS PRN
Status: DISCONTINUED | OUTPATIENT
Start: 2024-12-26 | End: 2025-01-03 | Stop reason: HOSPADM

## 2024-12-26 RX ORDER — ONDANSETRON 4 MG/1
4 TABLET, ORALLY DISINTEGRATING ORAL EVERY 8 HOURS PRN
Status: DISCONTINUED | OUTPATIENT
Start: 2024-12-26 | End: 2025-01-03 | Stop reason: HOSPADM

## 2024-12-26 RX ORDER — CLOPIDOGREL BISULFATE 75 MG/1
300 TABLET ORAL ONCE
Status: COMPLETED | OUTPATIENT
Start: 2024-12-26 | End: 2024-12-26

## 2024-12-26 RX ORDER — ACETAMINOPHEN 650 MG/1
650 SUPPOSITORY RECTAL EVERY 6 HOURS PRN
Status: DISCONTINUED | OUTPATIENT
Start: 2024-12-26 | End: 2025-01-02

## 2024-12-26 RX ORDER — POLYETHYLENE GLYCOL 3350 17 G/17G
17 POWDER, FOR SOLUTION ORAL DAILY PRN
Status: DISCONTINUED | OUTPATIENT
Start: 2024-12-26 | End: 2025-01-03 | Stop reason: HOSPADM

## 2024-12-26 RX ADMIN — SODIUM CHLORIDE 100 ML: 9 INJECTION, SOLUTION INTRAVENOUS at 11:39

## 2024-12-26 RX ADMIN — CLOPIDOGREL BISULFATE 300 MG: 75 TABLET ORAL at 12:29

## 2024-12-26 RX ADMIN — SODIUM CHLORIDE: 9 INJECTION, SOLUTION INTRAVENOUS at 22:37

## 2024-12-26 RX ADMIN — SODIUM CHLORIDE, PRESERVATIVE FREE 10 ML: 5 INJECTION INTRAVENOUS at 11:39

## 2024-12-26 RX ADMIN — IOPAMIDOL 75 ML: 755 INJECTION, SOLUTION INTRAVENOUS at 11:39

## 2024-12-26 RX ADMIN — ENOXAPARIN SODIUM 40 MG: 100 INJECTION SUBCUTANEOUS at 22:38

## 2024-12-26 RX ADMIN — ASPIRIN 324 MG: 81 TABLET, CHEWABLE ORAL at 12:29

## 2024-12-26 RX ADMIN — SODIUM CHLORIDE, PRESERVATIVE FREE 10 ML: 5 INJECTION INTRAVENOUS at 22:55

## 2024-12-26 NOTE — VIRTUAL HEALTH
alcohol    Drug use: No    Sexual activity: Never     Partners: Male   Other Topics Concern    Not on file   Social History Narrative    Not on file     Social Determinants of Health     Financial Resource Strain: Low Risk  (3/27/2023)    Overall Financial Resource Strain (CARDIA)     Difficulty of Paying Living Expenses: Not hard at all   Food Insecurity: No Food Insecurity (12/22/2024)    Hunger Vital Sign     Worried About Running Out of Food in the Last Year: Never true     Ran Out of Food in the Last Year: Never true   Transportation Needs: No Transportation Needs (12/22/2024)    PRAPARE - Transportation     Lack of Transportation (Medical): No     Lack of Transportation (Non-Medical): No   Physical Activity: Not on file   Stress: Not on file   Social Connections: Not on file   Intimate Partner Violence: Not on file   Housing Stability: Low Risk  (12/22/2024)    Housing Stability Vital Sign     Unable to Pay for Housing in the Last Year: No     Number of Times Moved in the Last Year: 1     Homeless in the Last Year: No     Family History      Problem Relation Age of Onset    Diabetes Mother        OBJECTIVE  BP (!) 163/85   Pulse 68   Temp 98.4 °F (36.9 °C)   Resp 20   LMP 10/13/2015 (Approximate) Comment: possible menopause  SpO2 97%     Pre-Morbid mRS: 1    Imaging:  Images were personally reviewed with [email or web address] and PACS used to review images including:  CT brain without contrast: no hemorrhage, no large hypodensity  CTA imaging: no large vessel occlusion  Radiology final reads pending.    Assessment    # Confusion, L face droop, BLE weakness  # Recent biopsy left-sided cervical lymph node chain  # chronic back pain, cholelithiasis, thoracic and abdominal lymphadenopathy    Differential DDx:  C/f TIA/AIS not ruled out especially given medical history  Consider toxic/metabolic/infectious workup      Recommendations:  NIH 9 per ED provider  Recommend Inpatient Neurology Consult for further assessment and  evaluation   Patient presents with stroke-like symptoms but is not an IV thrombolytic candidate due to:out of window  Permissive SBP <220 for now  Aspirin 325 x1 and Plavix 300 x1 and Atorvastatin 40 x1  MRI brain  F/up MRI to discuss if ongoing antiplatelet therapy would be beneficial  Control of stroke risk factors per primary team  LDL, A1c, TTE, and stroke workup per primary team  Rest of care per primary team        Discussed with ED Physician      At least 35 min of Telemedicine and time in conversation directly with ED staff and physician for the patient who is in imminent and life threatening deterioration without further treatment and evaluation.  This Virtual Visit was conducted with patient's (and/or legal guardian's) consent, to provide telestroke consultation and necessary medical care.  Time spent examining patient, reviewing the images personally, reviewing the chart, perform high complexity decision making and speaking with the nursing staff regarding recommendations      Telestroke: This is a Phone Consult, I have not seen the patient face to face, the telemedicine device was not utilized.  [facility] EMERGENCY DEPT      Ming Galeano MD  Stroke, Neurocritical Care And/or Neurointervention  [facility]  [facility]  [facility] [place]  Electronically signed 12/26/2024 at 1:03 PM

## 2024-12-26 NOTE — ED PROVIDER NOTES
EMERGENCY DEPARTMENT ENCOUNTER    Pt Name: Suzan Garcia  MRN: [id number]  Birthdate 1965  Date of evaluation: 24  CHIEF COMPLAINT       Chief Complaint   Patient presents with    Facial Droop    Extremity Weakness     HISTORY OF PRESENT ILLNESS   HPI  Left-sided facial droop started last night around 7 PM.  Then feeling worse, feels some left-sided weakness, both of her legs feel weak, progressing since 11 PM yesterday.  Last known well was 7 PM yesterday.  She only speaks Romanian.  Her son is here who speaks Romanian and English she is translating emergently.  We will set up  after she returns from CT.     used.  She is been having some low back pain for several weeks.  Checking MRI brain in addition to MRI CTL spine.  Concern for her bilateral leg weakness left-sided facial droop.  This is very unusual presentation.      REVIEW OF SYSTEMS     Review of Systems   All other systems reviewed and are negative.    PASTMEDICAL HISTORY     Past Medical History:   Diagnosis Date    Chronic back pain      Past Problem List  Patient Active Problem List   Diagnosis Code    ASCUS with positive high risk HPV cervical R87.610, R87.810    Screening mammogram for breast cancer Z12.31    Sleep disturbance G47.9    Cholelithiasis without cholangitis K80.20    Biliary colic K80.50    Cervical lymphadenopathy R59.0    Lymphadenopathy, generalized R59.1    Thrombocytopenia (HCC) D69.6    Stroke-like symptoms R29.90     SURGICAL HISTORY       Past Surgical History:   Procedure Laterality Date     SECTION      IR BIOPSY LYMPH NODE SUPERFICIAL  2024    IR BIOPSY LYMPH NODE SUPERFICIAL 2024 [facility] SPECIAL PROCEDURES     CURRENT MEDICATIONS       Previous Medications    AMLODIPINE (NORVASC) 2.5 MG TABLET    Take 1 tablet by mouth daily    DOCUSATE SODIUM (COLACE) 100 MG CAPSULE    Take 1 capsule by mouth 2 times daily as needed for Constipation    ONDANSETRON (ZOFRAN-ODT) 4 MG  (*)     All other components within normal limits   CULTURE, BLOOD 1   CULTURE, BLOOD 1   PROTIME-INR   LACTATE, SEPSIS   C-REACTIVE PROTEIN   LACTATE, SEPSIS   TROPONIN   POCT GLUCOSE       Vitals Reviewed:    Vitals:    12/26/24 1201 12/26/24 1215 12/26/24 1228 12/26/24 1230   BP: (!) 164/88 (!) 167/88  (!) 163/85   Pulse: 68      Resp: 20      Temp: 98.4 °F (36.9 °C)      SpO2: 98%  99% 97%     MEDICATIONS GIVEN TO PATIENT THIS ENCOUNTER:  Orders Placed This Encounter   Medications    iopamidol (ISOVUE-370) 76 % injection 75 mL    sodium chloride 0.9 % bolus 100 mL    sodium chloride flush 0.9 % injection 10 mL    aspirin chewable tablet 324 mg    clopidogrel (PLAVIX) tablet 300 mg     DISCHARGE PRESCRIPTIONS:  New Prescriptions    No medications on file     PHYSICIAN CONSULTS ORDERED THIS ENCOUNTER:  IP CONSULT TO HOSPITALIST  FINAL IMPRESSION      1. Facial droop    2. Acute ischemic stroke (HCC)    3. Leg weakness, bilateral    4. Chronic bilateral low back pain, unspecified whether sciatica present          DISPOSITION/PLAN   DISPOSITION Admitted 12/26/2024 12:33:49 PM               OUTPATIENT FOLLOW UP THE PATIENT:  No follow-up provider specified.    MD Dariusz Mathews Wesley D, MD  12/26/24 1251       Petr Arzola MD  12/26/24 1251

## 2024-12-26 NOTE — PROGRESS NOTES
Spiritual Health History and Assessment/Progress Note  [facility]    (P) Crisis, (P) Code Stroke,  ,      Name: Suzan Garcia MRN: [id number]    Age: 59 y.o.     Sex: female   Language: Belarusian   Bahai: Restorationist   Stroke-like symptoms     Date: 12/26/2024                           Spiritual Assessment began in [facility] EMERGENCY DEPARTMENT        Referral/Consult From: (P) Other    Encounter Overview/Reason: (P) Crisis  Service Provided For: (P) Patient and family together    Emmy, Belief, Meaning:   Patient unable to assess at this time  Family/Friends Son acting as  for Sami speaking patient declined Spiritual Health intervention.      Importance and Influence:  Patient unable to assess at this time  Family/Friends     Community:  Patient unable to assess at this time  Family/Friends     Assessment and Plan of Care:     Patient Interventions include: Spiritual Health available at patient request.  Family/Friends Interventions include: Spiritual Health available at patient request.    Patient Plan of Care: Spiritual Health available at patient request.  Family/Friends Plan of Care: Spiritual Health available upon patient request.    Wished both patient and son peace.      Electronically signed by Chaplain Radha on 12/26/2024 at 12:47 PM    12/26/24 7022   Encounter Summary   Encounter Overview/Reason Crisis   Service Provided For Patient and family together   Referral/Consult From Other    Last Encounter  12/26/24   Complexity of Encounter Moderate   Crisis   Type Code Stroke   Assessment/Intervention/Outcome   Assessment Calm   Intervention Sustaining Presence/Ministry of presence   Outcome Refused/Declined  (Patient (Belarusian speaking only) declined  intervention via her son (acting as .))

## 2024-12-26 NOTE — PROGRESS NOTES
PS received 11:26 a.m for Stroke Alert, 58 yo female with c/o facial drooping and extremity weakness.  Patient unavailable in CT scan.   Patient speaks Albanian only, her son is with her who is interpreting for her.  Spiritual care will remain available and will follow up as needed.   12/26/24 1140   Encounter Summary   Encounter Overview/Reason Crisis   Service Provided For Patient and family together   Referral/Consult From Multi-disciplinary team   Complexity of Encounter High   Crisis   Type Rapid Response   Assessment/Intervention/Outcome   Assessment Unable to assess

## 2024-12-26 NOTE — ED NOTES
Mode of arrival (squad #, walk in, police, etc) : walk in    Chief complaint(s): Stroke alert    Arrival Note (brief scenario, treatment PTA, etc).: Pt brought in for left facial droop and worsening weakness, left worse than right. Per pt family, symptom onset approx 1830/1900 12/25.     C= \"Have you ever felt that you should Cut down on your drinking?\"  No  A= \"Have people Annoyed you by criticizing your drinking?\"  No  G= \"Have you ever felt bad or Guilty about your drinking?\"  No  E= \"Have you ever had a drink as an Eye-opener first thing in the morning to steady your nerves or to help a hangover?\"  No      Deferred []    Reason for deferring: N/A  *If yes to two or more: probable alcohol abuse.*

## 2024-12-27 ENCOUNTER — APPOINTMENT (OUTPATIENT)
Age: 59
End: 2024-12-27
Payer: COMMERCIAL

## 2024-12-27 ENCOUNTER — APPOINTMENT (OUTPATIENT)
Dept: INTERVENTIONAL RADIOLOGY/VASCULAR | Age: 59
End: 2024-12-27
Payer: COMMERCIAL

## 2024-12-27 PROBLEM — R29.898 LEG WEAKNESS, BILATERAL: Status: ACTIVE | Noted: 2024-12-27

## 2024-12-27 PROBLEM — G51.0 BELL'S PALSY: Status: ACTIVE | Noted: 2024-12-27

## 2024-12-27 PROBLEM — R29.810 FACIAL DROOP: Status: ACTIVE | Noted: 2024-12-27

## 2024-12-27 LAB
ALBUMIN CSF-MCNC: 831 MG/L (ref 70–350)
ALBUMIN INDEX: 207.8
ALBUMIN SERPL-MCNC: 4 G/DL (ref 3.5–5.2)
ANION GAP SERPL CALCULATED.3IONS-SCNC: 9 MMOL/L (ref 9–16)
APPEARANCE CSF: COLORLESS
BASOPHILS # BLD: 0.25 K/UL (ref 0–0.2)
BASOPHILS NFR BLD: 3 % (ref 0–2)
BUN SERPL-MCNC: 23 MG/DL (ref 6–20)
C GATTII+NEOFOR DNA CSF QL NAA+NON-PROBE: NOT DETECTED
CALCIUM SERPL-MCNC: 9 MG/DL (ref 8.6–10.4)
CHLORIDE SERPL-SCNC: 107 MMOL/L (ref 98–107)
CMV DNA CSF QL NAA+NON-PROBE: NOT DETECTED
CO2 SERPL-SCNC: 24 MMOL/L (ref 20–31)
COLOR CSF: CLEAR
CREAT SERPL-MCNC: 0.8 MG/DL (ref 0.7–1.2)
E COLI K1 DNA CSF QL NAA+NON-PROBE: NOT DETECTED
ECHO AO ASC DIAM: 2.7 CM
ECHO AO ASCENDING AORTA INDEX: 1.58 CM/M2
ECHO AO ROOT DIAM: 2.9 CM
ECHO AO ROOT INDEX: 1.7 CM/M2
ECHO AV AREA PEAK VELOCITY: 1.8 CM2
ECHO AV AREA VTI: 2.1 CM2
ECHO AV AREA/BSA PEAK VELOCITY: 1.1 CM2/M2
ECHO AV AREA/BSA VTI: 1.2 CM2/M2
ECHO AV MEAN GRADIENT: 11 MMHG
ECHO AV MEAN VELOCITY: 1.5 M/S
ECHO AV PEAK GRADIENT: 23 MMHG
ECHO AV PEAK VELOCITY: 2.4 M/S
ECHO AV VELOCITY RATIO: 0.58
ECHO AV VTI: 42.8 CM
ECHO BSA: 1.76 M2
ECHO EST RA PRESSURE: 3 MMHG
ECHO LA AREA 2C: 17.3 CM2
ECHO LA AREA 4C: 17.1 CM2
ECHO LA DIAMETER INDEX: 1.99 CM/M2
ECHO LA DIAMETER: 3.4 CM
ECHO LA MAJOR AXIS: 5.4 CM
ECHO LA MINOR AXIS: 5.2 CM
ECHO LA TO AORTIC ROOT RATIO: 1.17
ECHO LA VOL BP: 45 ML (ref 22–52)
ECHO LA VOL MOD A2C: 46 ML (ref 22–52)
ECHO LA VOL MOD A4C: 42 ML (ref 22–52)
ECHO LA VOL/BSA BIPLANE: 26 ML/M2 (ref 16–34)
ECHO LA VOLUME INDEX MOD A2C: 27 ML/M2 (ref 16–34)
ECHO LA VOLUME INDEX MOD A4C: 25 ML/M2 (ref 16–34)
ECHO LV E' LATERAL VELOCITY: 9.19 CM/S
ECHO LV E' SEPTAL VELOCITY: 5.66 CM/S
ECHO LV EDV A2C: 67 ML
ECHO LV EDV A4C: 87 ML
ECHO LV EDV INDEX A4C: 51 ML/M2
ECHO LV EDV NDEX A2C: 39 ML/M2
ECHO LV EF PHYSICIAN: 65 %
ECHO LV EJECTION FRACTION A2C: 66 %
ECHO LV EJECTION FRACTION A4C: 64 %
ECHO LV EJECTION FRACTION BIPLANE: 63 % (ref 55–100)
ECHO LV ESV A2C: 23 ML
ECHO LV ESV A4C: 31 ML
ECHO LV ESV INDEX A2C: 13 ML/M2
ECHO LV ESV INDEX A4C: 18 ML/M2
ECHO LV FRACTIONAL SHORTENING: 34 % (ref 28–44)
ECHO LV INTERNAL DIMENSION DIASTOLE INDEX: 2.92 CM/M2
ECHO LV INTERNAL DIMENSION DIASTOLIC: 5 CM (ref 3.9–5.3)
ECHO LV INTERNAL DIMENSION SYSTOLIC INDEX: 1.93 CM/M2
ECHO LV INTERNAL DIMENSION SYSTOLIC: 3.3 CM
ECHO LV IVSD: 0.9 CM (ref 0.6–0.9)
ECHO LV MASS 2D: 158.2 G (ref 67–162)
ECHO LV MASS INDEX 2D: 92.5 G/M2 (ref 43–95)
ECHO LV POSTERIOR WALL DIASTOLIC: 0.9 CM (ref 0.6–0.9)
ECHO LV RELATIVE WALL THICKNESS RATIO: 0.36
ECHO LVOT AREA: 3.1 CM2
ECHO LVOT AV VTI INDEX: 0.66
ECHO LVOT DIAM: 2 CM
ECHO LVOT MEAN GRADIENT: 4 MMHG
ECHO LVOT PEAK GRADIENT: 7 MMHG
ECHO LVOT PEAK VELOCITY: 1.4 M/S
ECHO LVOT STROKE VOLUME INDEX: 52 ML/M2
ECHO LVOT SV: 88.9 ML
ECHO LVOT VTI: 28.3 CM
ECHO MV A VELOCITY: 1.17 M/S
ECHO MV AREA VTI: 2 CM2
ECHO MV E DECELERATION TIME (DT): 227 MS
ECHO MV E VELOCITY: 0.78 M/S
ECHO MV E/A RATIO: 0.67
ECHO MV E/E' LATERAL: 8.49
ECHO MV E/E' RATIO (AVERAGED): 11.13
ECHO MV E/E' SEPTAL: 13.78
ECHO MV LVOT VTI INDEX: 1.58
ECHO MV MAX VELOCITY: 1.4 M/S
ECHO MV MEAN GRADIENT: 3 MMHG
ECHO MV MEAN VELOCITY: 0.8 M/S
ECHO MV PEAK GRADIENT: 7 MMHG
ECHO MV VTI: 44.6 CM
ECHO RA AREA 4C: 13 CM2
ECHO RA END SYSTOLIC VOLUME APICAL 4 CHAMBER INDEX BSA: 16 ML/M2
ECHO RA VOLUME: 27 ML
ECHO RIGHT VENTRICULAR SYSTOLIC PRESSURE (RVSP): 33 MMHG
ECHO RV BASAL DIMENSION: 2.5 CM
ECHO RV TAPSE: 1.8 CM (ref 1.7–?)
ECHO TV REGURGITANT MAX VELOCITY: 2.76 M/S
ECHO TV REGURGITANT PEAK GRADIENT: 31 MMHG
EOSINOPHIL # BLD: 1.01 K/UL (ref 0–0.4)
EOSINOPHILS RELATIVE PERCENT: 12 % (ref 0–4)
ERYTHROCYTE [DISTWIDTH] IN BLOOD BY AUTOMATED COUNT: 15.2 % (ref 11.5–14.9)
EST. AVERAGE GLUCOSE BLD GHB EST-MCNC: 117 MG/DL
EV RNA CSF QL NAA+NON-PROBE: NOT DETECTED
FLOW CYTOMETRY SOURCE: NORMAL
FLOW CYTOMETRY, NODE/FLUID: NORMAL
GFR, ESTIMATED: 85 ML/MIN/1.73M2
GLUCOSE BLD-MCNC: 140 MG/DL (ref 65–105)
GLUCOSE CSF-MCNC: 58 MG/DL (ref 40–70)
GLUCOSE SERPL-MCNC: 111 MG/DL (ref 74–99)
GP B STREP DNA CSF QL NAA+NON-PROBE: NOT DETECTED
HAEM INFLU DNA CSF QL NAA+NON-PROBE: NOT DETECTED
HBA1C MFR BLD: 5.7 % (ref 4–6)
HCT VFR BLD AUTO: 38.2 % (ref 36–46)
HGB BLD-MCNC: 12.6 G/DL (ref 12–16)
HHV6 DNA CSF QL NAA+NON-PROBE: NOT DETECTED
HSV1 DNA CSF QL NAA+NON-PROBE: NOT DETECTED
HSV2 DNA CSF QL NAA+NON-PROBE: NOT DETECTED
IGG CSF-MCNC: 44 MG/DL (ref 1–3)
IGG INDEX CSF: 0.65
IGG SERPL-MCNC: 3271 MG/DL (ref 700–1600)
IGG SYNTHESIS RATE CSF: 60.2 MG/24 H
L MONOCYTOG DNA CSF QL NAA+NON-PROBE: NOT DETECTED
LYMPHOCYTES NFR BLD: 2.27 K/UL (ref 1–4.8)
LYMPHOCYTES RELATIVE PERCENT: 27 % (ref 24–44)
MCH RBC QN AUTO: 27.4 PG (ref 26–34)
MCHC RBC AUTO-ENTMCNC: 32.9 G/DL (ref 31–37)
MCV RBC AUTO: 83.2 FL (ref 80–100)
MONOCYTES NFR BLD: 0.42 K/UL (ref 0.1–1.3)
MONOCYTES NFR BLD: 5 % (ref 1–7)
MORPHOLOGY: ABNORMAL
MORPHOLOGY: ABNORMAL
N MEN DNA CSF QL NAA+NON-PROBE: NOT DETECTED
NEUTROPHILS NFR BLD: 53 % (ref 36–66)
NEUTS SEG NFR BLD: 4.45 K/UL (ref 1.3–9.1)
NUC CELL # FLD MANUAL: 0 CELLS/UL
OLIGOCLONAL BANDS: ABNORMAL
PARECHOVIRUS A RNA CSF QL NAA+NON-PROBE: NOT DETECTED
PLATELET # BLD AUTO: 264 K/UL (ref 150–450)
PMV BLD AUTO: 6.9 FL (ref 6–12)
POTASSIUM SERPL-SCNC: 4.2 MMOL/L (ref 3.7–5.3)
PROT CSF-MCNC: 155 MG/DL (ref 15–45)
RBC # BLD AUTO: 4.6 M/UL (ref 4–5.2)
RBC # FLD MANUAL: 30 CELLS/UL
S PNEUM DNA CSF QL NAA+NON-PROBE: NOT DETECTED
SODIUM SERPL-SCNC: 140 MMOL/L (ref 136–145)
SPECIMEN DESCRIPTION: NORMAL
SPECIMEN VOL CSF: ABNORMAL ML
TUBE # CSF: 2
VZV DNA CSF QL NAA+NON-PROBE: NOT DETECTED
WBC OTHER # BLD: 8.4 K/UL (ref 3.5–11)
XANTHOCHROMIA CSF QL: ABNORMAL

## 2024-12-27 PROCEDURE — 82164 ANGIOTENSIN I ENZYME TEST: CPT

## 2024-12-27 PROCEDURE — 82784 ASSAY IGA/IGD/IGG/IGM EACH: CPT

## 2024-12-27 PROCEDURE — 2500000003 HC RX 250 WO HCPCS

## 2024-12-27 PROCEDURE — 93306 TTE W/DOPPLER COMPLETE: CPT | Performed by: INTERNAL MEDICINE

## 2024-12-27 PROCEDURE — 6370000000 HC RX 637 (ALT 250 FOR IP)

## 2024-12-27 PROCEDURE — 99223 1ST HOSP IP/OBS HIGH 75: CPT | Performed by: PSYCHIATRY & NEUROLOGY

## 2024-12-27 PROCEDURE — 009U3ZX DRAINAGE OF SPINAL CANAL, PERCUTANEOUS APPROACH, DIAGNOSTIC: ICD-10-PCS | Performed by: RADIOLOGY

## 2024-12-27 PROCEDURE — 97162 PT EVAL MOD COMPLEX 30 MIN: CPT

## 2024-12-27 PROCEDURE — 6360000002 HC RX W HCPCS

## 2024-12-27 PROCEDURE — 2580000003 HC RX 258

## 2024-12-27 PROCEDURE — 80048 BASIC METABOLIC PNL TOTAL CA: CPT

## 2024-12-27 PROCEDURE — 87205 SMEAR GRAM STAIN: CPT

## 2024-12-27 PROCEDURE — 51798 US URINE CAPACITY MEASURE: CPT

## 2024-12-27 PROCEDURE — 87483 CNS DNA AMP PROBE TYPE 12-25: CPT

## 2024-12-27 PROCEDURE — 82945 GLUCOSE OTHER FLUID: CPT

## 2024-12-27 PROCEDURE — 86617 LYME DISEASE ANTIBODY: CPT

## 2024-12-27 PROCEDURE — 97530 THERAPEUTIC ACTIVITIES: CPT

## 2024-12-27 PROCEDURE — 62328 DX LMBR SPI PNXR W/FLUOR/CT: CPT

## 2024-12-27 PROCEDURE — 83516 IMMUNOASSAY NONANTIBODY: CPT

## 2024-12-27 PROCEDURE — 82040 ASSAY OF SERUM ALBUMIN: CPT

## 2024-12-27 PROCEDURE — 83916 OLIGOCLONAL BANDS: CPT

## 2024-12-27 PROCEDURE — 88108 CYTOPATH CONCENTRATE TECH: CPT

## 2024-12-27 PROCEDURE — 99222 1ST HOSP IP/OBS MODERATE 55: CPT | Performed by: ORTHOPAEDIC SURGERY

## 2024-12-27 PROCEDURE — 84157 ASSAY OF PROTEIN OTHER: CPT

## 2024-12-27 PROCEDURE — 85025 COMPLETE CBC W/AUTO DIFF WBC: CPT

## 2024-12-27 PROCEDURE — 2709999900 IR LUMBAR PUNCTURE FOR DIAGNOSIS

## 2024-12-27 PROCEDURE — 36415 COLL VENOUS BLD VENIPUNCTURE: CPT

## 2024-12-27 PROCEDURE — 92523 SPEECH SOUND LANG COMPREHEN: CPT

## 2024-12-27 PROCEDURE — 92610 EVALUATE SWALLOWING FUNCTION: CPT

## 2024-12-27 PROCEDURE — 82042 OTHER SOURCE ALBUMIN QUAN EA: CPT

## 2024-12-27 PROCEDURE — 99233 SBSQ HOSP IP/OBS HIGH 50: CPT | Performed by: INTERNAL MEDICINE

## 2024-12-27 PROCEDURE — 89050 BODY FLUID CELL COUNT: CPT

## 2024-12-27 PROCEDURE — 93306 TTE W/DOPPLER COMPLETE: CPT

## 2024-12-27 PROCEDURE — 6370000000 HC RX 637 (ALT 250 FOR IP): Performed by: INTERNAL MEDICINE

## 2024-12-27 PROCEDURE — 2060000000 HC ICU INTERMEDIATE R&B

## 2024-12-27 PROCEDURE — 87070 CULTURE OTHR SPECIMN AEROBIC: CPT

## 2024-12-27 PROCEDURE — 97166 OT EVAL MOD COMPLEX 45 MIN: CPT

## 2024-12-27 RX ORDER — PREDNISONE 20 MG/1
60 TABLET ORAL DAILY
Status: DISCONTINUED | OUTPATIENT
Start: 2024-12-27 | End: 2024-12-28

## 2024-12-27 RX ORDER — CARBOXYMETHYLCELLULOSE SODIUM 5 MG/ML
1 SOLUTION/ DROPS OPHTHALMIC 4 TIMES DAILY
Status: DISCONTINUED | OUTPATIENT
Start: 2024-12-27 | End: 2025-01-03 | Stop reason: HOSPADM

## 2024-12-27 RX ADMIN — ACYCLOVIR SODIUM 285 MG: 50 INJECTION, SOLUTION INTRAVENOUS at 17:57

## 2024-12-27 RX ADMIN — CARBOXYMETHYLCELLULOSE SODIUM 1 DROP: 5 SOLUTION/ DROPS OPHTHALMIC at 20:57

## 2024-12-27 RX ADMIN — ASPIRIN 81 MG: 81 TABLET, CHEWABLE ORAL at 09:59

## 2024-12-27 RX ADMIN — SODIUM CHLORIDE, PRESERVATIVE FREE 10 ML: 5 INJECTION INTRAVENOUS at 20:57

## 2024-12-27 RX ADMIN — CLOPIDOGREL BISULFATE 75 MG: 75 TABLET ORAL at 09:59

## 2024-12-27 RX ADMIN — CARBOXYMETHYLCELLULOSE SODIUM 1 DROP: 5 SOLUTION/ DROPS OPHTHALMIC at 18:01

## 2024-12-27 RX ADMIN — ENOXAPARIN SODIUM 40 MG: 100 INJECTION SUBCUTANEOUS at 09:59

## 2024-12-27 RX ADMIN — ACETAMINOPHEN 650 MG: 325 TABLET ORAL at 17:55

## 2024-12-27 RX ADMIN — ROSUVASTATIN 40 MG: 40 TABLET, FILM COATED ORAL at 20:57

## 2024-12-27 RX ADMIN — PREDNISONE 60 MG: 20 TABLET ORAL at 17:55

## 2024-12-27 RX ADMIN — ACETAMINOPHEN 650 MG: 650 SUPPOSITORY RECTAL at 02:41

## 2024-12-27 RX ADMIN — SODIUM CHLORIDE, PRESERVATIVE FREE 10 ML: 5 INJECTION INTRAVENOUS at 09:59

## 2024-12-27 ASSESSMENT — PAIN SCALES - GENERAL
PAINLEVEL_OUTOF10: 4
PAINLEVEL_OUTOF10: 6
PAINLEVEL_OUTOF10: 5
PAINLEVEL_OUTOF10: 3
PAINLEVEL_OUTOF10: 0

## 2024-12-27 ASSESSMENT — PAIN DESCRIPTION - LOCATION
LOCATION: HEAD

## 2024-12-27 ASSESSMENT — PAIN DESCRIPTION - ONSET
ONSET: ON-GOING

## 2024-12-27 ASSESSMENT — PAIN DESCRIPTION - DESCRIPTORS
DESCRIPTORS: ACHING;TIGHTNESS
DESCRIPTORS: ACHING
DESCRIPTORS: ACHING;TIGHTNESS
DESCRIPTORS: ACHING;THROBBING

## 2024-12-27 ASSESSMENT — PAIN - FUNCTIONAL ASSESSMENT
PAIN_FUNCTIONAL_ASSESSMENT: ACTIVITIES ARE NOT PREVENTED

## 2024-12-27 ASSESSMENT — PAIN DESCRIPTION - ORIENTATION: ORIENTATION: ANTERIOR

## 2024-12-27 NOTE — PROGRESS NOTES
pt haven't peed the whole night. Writer bladder scanned her and its 378. Vita Ferreira NP was notified and states to inform the oncoming shift.

## 2024-12-27 NOTE — PROGRESS NOTES
Per Dr. Gandhi, do not give prednisone or acyclovir until after LP is completed d/t risk for contamination of results/skewing.

## 2024-12-27 NOTE — CONSULTS
Inpatient consult to Orthopedic Surgery  Consult performed by: Landon Conner MD  Consult ordered by: Enriqueta Rosado MD        Patient: Suzan Shirleyos  Unit/Bed: 2096/2096-01  YOB: 1965  MRN: [id number]  Acct: [id number]   Admitting Diagnosis: Facial droop [R29.810]  Acute ischemic stroke (HCC) [I63.9]  Leg weakness, bilateral [R29.898]  Stroke-like symptoms [R29.90]  Chronic bilateral low back pain, unspecified whether sciatica present [M54.50, G89.29]  Admit Date:  12/26/2024  Hospital Day: 1    Subjective:    Patient is having problems with  facial droop left with dysesthesia subjective dyesthesia in bilater fingers and toes denies saddle anesthesia per nursing  Extremity Weakness      Patient Seen, Chart, Labs, Radiologystudies, and Consults reviewed.      Please refer to neurology note agree with     Pt sleeping and does not speak english repeat H&P not likely beneficial    Objective:   BP (!) 142/102   Pulse 69   Temp 97.9 °F (36.6 °C) (Oral)   Resp 20   Ht 1.54 m (5' 0.63\")   Wt 72.6 kg (160 lb)   LMP 10/13/2015 (Approximate) Comment: possible menopause  SpO2 99%   BMI 30.60 kg/m²   Intake/Output Summary (Last 24 hours) at 12/27/2024 1656  Last data filed at 12/27/2024 0544  Gross per 24 hour   Intake 528.29 ml   Output --   Net 528.29 ml     Review of Systems  Physical Exam  Per report transfers denies saddle anesthersia    Drains:No  Imaging:Yes MRI CTand L normal for age moderated L4-5 stenosis not c/w symptoms    MRI brain CT brain and CT angiogram reports reviewed      Medications:    predniSONE  60 mg Oral Daily    carboxymethylcellulose  1 drop Left Eye 4x Daily    acyclovir  5 mg/kg (Adjusted) IntraVENous q8h    [Held by provider] amLODIPine  2.5 mg Oral Daily    sodium chloride flush  5-40 mL IntraVENous 2 times per day    enoxaparin  40 mg SubCUTAneous Daily    rosuvastatin  40 mg Oral Nightly     PRN Meds:perflutren lipid microspheres, sodium chloride flush, sodium

## 2024-12-27 NOTE — OR NURSING
Patient brought to the IR suite via cart, after consent obtained, with patient and her son,  patient placed on the procedure table.  Patient then positioned/prepped/draped

## 2024-12-27 NOTE — PROGRESS NOTES
WFL  Strength RLE  Strength RLE: WFL  Comment: hip flexion and knee extension limited by pain  Strength LLE  Strength LLE: WFL           Bed mobility  Supine to Sit: Stand by assistance (head of bed elevated and use of rail)  Sit to Supine: Contact guard assistance (bed flat- pt assisted B LE onto bed)  Scooting: Stand by assistance  Transfers  Sit to Stand: Minimal Assistance  Stand to Sit: Minimal Assistance  Comment: pt stood bedside with RW- limited wt bearing on R LE due to pain  Ambulation  Device: Rolling Walker  Assistance: Minimal assistance  Distance: pt took few side-steps at bed side  Comments: pt abruptly sits back on bed due to pain  More Ambulation?: No  Stairs/Curb  Stairs?: No     Balance  Sitting - Static: Fair;+ (SBA)  Sitting - Dynamic: Fair;+ (SBA)  Standing - Static: Fair (CGA with RW)               AM-PAC - Mobility    AM-PAC Basic Mobility - Inpatient   How much help is needed turning from your back to your side while in a flat bed without using bedrails?: None  How much help is needed moving from lying on your back to sitting on the side of a flat bed without using bedrails?: A Little  How much help is needed moving to and from a bed to a chair?: Total  How much help is needed standing up from a chair using your arms?: Total  How much help is needed walking in hospital room?: Total  How much help is needed climbing 3-5 steps with a railing?: Total  AM-PAC Inpatient Mobility Raw Score : 11  AM-PAC Inpatient T-Scale Score : 33.86  Mobility Inpatient CMS 0-100% Score: 72.57  Mobility Inpatient CMS G-Code Modifier : CL              Goals  Short Term Goals  Time Frame for Short Term Goals: 2-3 days  Short Term Goal 1: mod-I bed mobility  Short Term Goal 2: mod-I transfers from various surfaces  Short Term Goal 3: pt able to ambulate with RW mod-I x 50ft for household distances  Short Term Goal 4: pt able to negotiate flight of stairs mod-I to access bathroom/bedroom           Therapy Time

## 2024-12-27 NOTE — PROGRESS NOTES
12/27/24 1433   Encounter Summary   Encounter Overview/Reason Volunteer Encounter   Last Encounter  12/27/24   Assessment/Intervention/Outcome   Intervention Prayer (assurance of)/[place]        Attending Attestation (For Attendings USE Only)...

## 2024-12-27 NOTE — PROGRESS NOTES
Case discussed in person with neurology  Differential diagnosis of GBS syndrome demyelinating disease.  Plan for lumbar puncture for further evaluation    Hong Woo MD  12/27/2024

## 2024-12-27 NOTE — PROGRESS NOTES
[facility]  IN-PATIENT SERVICE  [facility]    PROGRESS NOTE             12/27/2024    9:18 AM    Name:   Suzan Garcia  MRN:     [id number]     Acct:      [id number]   Room:   2096/2096-01   Day:  1  Admit Date:  12/26/2024 11:23 AM    PCP:  No primary care provider on file.  Code Status:  Full Code    Subjective:     C/C:   Chief Complaint   Patient presents with    Facial Droop    Extremity Weakness     Interval History Status: improved.    Patient was seen and examined at bedside.  Vitally stable.  No acute event overnight.    Neurological exam improved.  She does have some improvement in her left lower extremity weakness, power improved.  But, not able to move her right lower extremity.  She does have left facial droop.     MRI brain showed no acute intracranial abnormalities, except for some microvascular changes.MRI cervical spine showed mild spinal canal stenosis at C6-C7 and T1-T2.  MRI thoracic spine showed mild spinal stenosis at T8-T9 secondary to posterior disc protrusion.  MRI lumbar spine showed central disc protrusion at L4-L5, moderately narrows the spinal canal.  Posterior disc bulge at L3-L4 and mild spinal canal stenosis.  Mild bilateral neural foraminal narrowing at L3-L4 and L4-L5.    Neurology consulted.  Will appreciate recommendations.    Brief History:     The patient is a 59 y.o.   /  female who presents withFacial Droop and Extremity Weakness   and she is admitted to the hospital for the management of  Stroke like symptoms.      She has a past medical history of back pain and hypertension.  She came in today with a complaint of left facial droop and bilateral lower extremity  weakness.  She started having the symptoms since 7 PM on 12/25/2024.  Symptoms progressively got worsened.  She does not complain of any headache, visual changes, chest pain, shortness of breath, abdominal and bowel control.  She did report  mL fluids     DVT prophylaxis: Protonix    Plan will be discussed with the attending    Junior Romano MD  PGY I Transitional Year Resident  12/27/2024 9:18 AM   Attending Physician Statement  I have discussed the care of Suzan Garcia and I have examined the patient myself and taken ROS and HPI, including pertinent history and exam findings, with the resident. I have reviewed the key elements of all parts of the encounter with the resident.  I agree with the assessment, plan and orders as documented by the resident.  Patient interviewed with  online  Left facial palsy both forehead and lower suggestive lower motor neuron lesion like Bell's palsy will do IV steroids and acyclovir  Bilateral lower extremity weakness left more than right with MRI showing disc bulge spine surgery  Brain imaging shows no stroke neurology consulted    Electronically signed by Hong Woo MD

## 2024-12-27 NOTE — PROGRESS NOTES
Per Speech, patient is ok for regular texture and regular thin liquid diet.     Per order, ok to discontinue iv fluids once patient is allowed to eat.     Order discontinued and order for diet placed.

## 2024-12-27 NOTE — PROGRESS NOTES
SPEECH LANGUAGE PATHOLOGY  Facility/Department: [facility]   CLINICAL BEDSIDE SWALLOW EVALUATION    NAME: Suzan Garcia  : 1965  MRN: [id number]    ADMISSION DATE: 2024  ADMITTING DIAGNOSIS: has ASCUS with positive high risk HPV cervical; Screening mammogram for breast cancer; Sleep disturbance; Cholelithiasis without cholangitis; Biliary colic; Cervical lymphadenopathy; Lymphadenopathy, generalized; Thrombocytopenia (HCC); Stroke-like symptoms; Facial droop; Leg weakness, bilateral; and Bell's palsy on their problem list.    Recent Chest CT of Chest: 2024  IMPRESSION:  1. No pulmonary emboli.  2. No acute pulmonary process.  3. Mediastinal, hilar, and axillary lymphadenopathy. This is nonspecific, and  could be seen with lymphoma or metastatic disease.  4. Nonspecific low-attenuation lesion in the left hepatic lobe measuring 18 x  20 mm in size. This could be further evaluated with MRI of the abdomen with  and without contrast.  This may represent a mildly complex cyst or  hemangioma, though a neoplastic process is in the differential.    Date of Eval: 2024  Evaluating Therapist: GIANNA Chen    Current Diet level:  Current Diet : NPO  Current Liquid Diet : NPO    Primary Complaint  The patient is a 59 y.o.   /  female who presents withFacial Droop and Extremity Weakness   and she is admitted to the hospital for the management of  Stroke like symptoms.      She has a past medical history of back pain and hypertension.  She came in today with a complaint of left facial droop and bilateral lower extremity  weakness.  She started having the symptoms since 7 PM on 2024.  Symptoms progressively got worsened.  She does not complain of any headache, visual changes, chest pain, shortness of breath, abdominal and bowel control.  She did report difficulty in walking.     CT head and CTA head and neck showed no acute abnormality.  MRI brain, and pan spine  Patient;RN;Family member  Family member consulted: son  RN Name: Lay Travis  Patient Education: results and recommendations  Patient Education Response: Demonstrated understanding             Therapy Time  SLP Individual Minutes  Time In: 1333  Time Out: 1344  Minutes: 11          Suresh Mike M.S., CCC-SLP   12/27/2024 3:37 PM

## 2024-12-27 NOTE — CARE COORDINATION
Case Management Assessment  Initial Evaluation    Date/Time of Evaluation: 12/27/2024 3:44 PM  Assessment Completed by: Antonella Worthy RN    If patient is discharged prior to next notation, then this note serves as note for discharge by case management.    Patient Name: Suzan Garcia                   YOB: 1965  Diagnosis: Facial droop [R29.810]  Acute ischemic stroke (HCC) [I63.9]  Leg weakness, bilateral [R29.898]  Stroke-like symptoms [R29.90]  Chronic bilateral low back pain, unspecified whether sciatica present [M54.50, G89.29]                   Date / Time: 12/26/2024 11:23 AM    Patient Admission Status: Inpatient   Readmission Risk (Low < 19, Mod (19-27), High > 27): Readmission Risk Score: 14.1    Current PCP: No primary care provider on file.  PCP verified by CM? Yes     Chart Reviewed: Yes      History Provided by: Patient, Other (see comment) (Used , Damion [id number], Pt. Speaks [place] Islander)  Patient Orientation: Alert and Oriented    Patient Cognition: Alert     Hospitalization in the last 30 days (Readmission):  No    If yes, Readmission Assessment in  Navigator will be completed.     Advance Directives:       Code Status: Full Code   Patient's Primary Decision Maker is:          Discharge Planning:     Patient lives with: Spouse/Significant Other, Children, Family Members Type of Home: House  Primary Care Giver: Self  Patient Support Systems include: Spouse/Significant Other, Children, Family Members   Current Financial resources: None  Current community resources: None  Current services prior to admission: Durable Medical Equipment            Current DME: Shower Chair            Type of Home Care services:  None     ADLS  Prior functional level: Independent in ADLs/IADLs, Assistance with the following:, Shopping  Current functional level: Independent in ADLs/IADLs, Assistance with the following:, Shopping     PT AM-PAC:   /24  OT AM-PAC:   /24     Family can provide

## 2024-12-27 NOTE — CONSULTS
[facility] Neurology   IN-PATIENT SERVICE      NEUROLOGY CONSULT  NOTE            Date:   12/27/2024  Patient name:  Suzan Garcia  Date of admission:  12/26/2024  YOB: 1965      Chief Complaint:     Chief Complaint   Patient presents with    Facial Droop    Extremity Weakness       Reason for Consult:      LE weakness, facial weakness     History of Present Illness:     The patient is a 59 y.o. female who presents with Facial Droop and Extremity Weakness  . The patient was seen and examined and the chart was reviewed.  Patient was recently discharged from the hospital after coming in for abdominal pain, findings of generalized lymphadenopathy, status post excisional cervical node biopsy and discharged on 12/24.  Since that time she has noticed that there is numbness in her face, weakness on the left side of her face, in addition endorses numbness and tingling in her fingertips as well as lower extremities.  She has felt like both lower extremities are weaker than normal.  Going back to the chart she also was seen in the ED on 12/16 and at that time had complaints of back pain and numbness and tingling.  It appears that the symptoms have been progressing since that time.  Patient is only Swedish-speaking and much of the history is obtained from her son through translation at bedside.    At this time she is awake alert and oriented.  There is obvious weakness of the full left side of her face upper and lower involvement.  Bilateral upper extremity strength is full.  Left lower extremity mildly weak with more significant weakness in the right lower extremity.  She does endorse numbness and tingling all throughout although sensory exam is fairly normal.  She is hyporeflexic throughout.    MRI of the brain without acute abnormalities.  MRI cervical spine without any significant stenosis.  MRI thoracic spine with mild canal stenosis at T8-9 secondary to posterior disc protrusion.  MRI lumbar spine  STRUCTURES/VENTRICLES: No acute infarct or mass.  Scattered  patchy foci of T2/FLAIR hyperintensity in the cerebral white matter,  nonspecific but generally ascribed to sequela of chronic microvascular  ischemia.  No mass effect or midline shift. No evidence of an acute  intracranial hemorrhage.  The ventricles and sulci are normal in size and  configuration.  The sellar/suprasellar regions appear unremarkable.  The  normal signal voids within the major intracranial vessels appear maintained.    ORBITS: The visualized portion of the orbits demonstrate no acute abnormality.    SINUSES: The visualized paranasal sinuses and mastoid air cells demonstrate  no acute abnormality.    BONES/SOFT TISSUES: The bone marrow signal intensity appears normal. The soft  tissues demonstrate no acute abnormality.    Impression  1.  No acute intracranial abnormality.  2. Mild chronic white matter microvascular ischemic changes.    No results found for this or any previous visit.    No results found for this or any previous visit.    Results for orders placed during the hospital encounter of 12/26/24    CT HEAD WO CONTRAST    Addendum 12/26/2024 11:59 AM  ADDENDUM:  Findings were communicated to Dr. KEYUR MITCHELL by the CORE team on  12/26/2024 at 11:52 am.    Narrative  EXAMINATION:  CT OF THE HEAD WITHOUT CONTRAST  12/26/2024 11:28 am    TECHNIQUE:  CT of the head was performed without the administration of intravenous  contrast. Automated exposure control, iterative reconstruction, and/or weight  based adjustment of the mA/kV was utilized to reduce the radiation dose to as  low as reasonably achievable.    COMPARISON:  None.    HISTORY:  ORDERING SYSTEM PROVIDED HISTORY: Stroke Symptoms  TECHNOLOGIST PROVIDED HISTORY:    Stroke Symptoms  Decision Support Exception - unselect if not a suspected or confirmed  emergency medical condition->Emergency Medical Condition (MA)  Reason for Exam: Stroke Symptoms,Facial Droop; Extremity

## 2024-12-27 NOTE — ED NOTES
Report given to LOUIS Kramer from [facility].   Report method by phone   The following was reviewed with receiving RN:   Current vital signs:  BP (!) 184/96   Pulse 68   Temp 98.4 °F (36.9 °C)   Resp 20   LMP 10/13/2015 (Approximate) Comment: possible menopause  SpO2 99%                MEWS Score: 1     Any medication or safety alerts were reviewed. Any pending diagnostics and notifications were also reviewed, as well as any safety concerns or issues, abnormal labs, abnormal imaging, and abnormal assessment findings. Questions were answered.

## 2024-12-27 NOTE — PROGRESS NOTES
SPEECH LANGUAGE PATHOLOGY  Facility/Department: [facility]  Initial Speech/Language/Cognitive Assessment    NAME: Suzan Garcia  : 1965   MRN: [id number]  ADMISSION DATE: 2024  ADMITTING DIAGNOSIS: has ASCUS with positive high risk HPV cervical; Screening mammogram for breast cancer; Sleep disturbance; Cholelithiasis without cholangitis; Biliary colic; Cervical lymphadenopathy; Lymphadenopathy, generalized; Thrombocytopenia (HCC); Stroke-like symptoms; Facial droop; Leg weakness, bilateral; and Bell's palsy on their problem list.    Date of Eval: 2024   Evaluating Therapist: GIANNA Chen    RECENT RESULTS MRI: 2024    Primary Complaint: The patient is a 59 y.o.   /  female who presents withFacial Droop and Extremity Weakness   and she is admitted to the hospital for the management of  Stroke like symptoms.      She has a past medical history of back pain and hypertension.  She came in today with a complaint of left facial droop and bilateral lower extremity  weakness.  She started having the symptoms since 7 PM on 2024.  Symptoms progressively got worsened.  She does not complain of any headache, visual changes, chest pain, shortness of breath, abdominal and bowel control.  She did report difficulty in walking.     CT head and CTA head and neck showed no acute abnormality.  MRI brain, and pan spine ordered.     She was recently discharged from [facility].  During that time, she presented with back pain radiating to frontal abdominal wall.  ALP was elevated.  Left cervical lymph node biopsy was done by IR.  HIDA scan revealed no acute cholecystitis.     On labs troponin was elevated 26, repeat 19.  ESR 48.     She was admitted for the management of stroke versus spinal cord pathology.    Pain:  Pain Assessment  Pain Assessment: None - Denies Pain  Pain Level: 4  Pain Location: Head  Pain Descriptors: Aching, Tightness  Functional Pain Assessment:  impairment    Motor Speech  Apraxic Characteristics: None  Dysarthric Characteristics: Blended word boundaries;Imprecise;Increased rate  Intelligibility: Unable to assess (Welsh speaking)  Overall Impairment Severity: Mild    Auditory Comprehension  Comprehension: Within Functional Limits         Expression  Primary Mode of Expression: Verbal    Verbal Expression  Verbal Expression: Within functional limits                   Cognition:      Orientation  Overall Orientation Status: Impaired  Orientation Level: Oriented to person;Oriented to place;Disoriented to time;Oriented to situation  Attention  Attention: Within Functional Limits  Memory  Memory: Unable to assess (cognitive assessment not fully completed due to translation unavailable (equipment malfunction))  Problem Solving  Problem Solving: Unable to assess (cognitive assessment not fully completed due to translation unavailable (equipment malfunction))  Abstract Reasoning  Abstract Reasoning: Unable to assess (cognitive assessment not fully completed due to translation unavailable (equipment malfunction))      Prognosis:  Speech Therapy Prognosis  Prognosis: Good  Prognosis Considerations: Age;Participation Level;Previous Level of Function;Severity of Impairments  Individuals consulted  Consulted and agree with results and recommendations: Patient;RN;Family member  Family member consulted: son  RN Name: Lay    Education:  Patient Education: results and recommendations  Patient Education Response: Demonstrated understanding          Therapy Time:   Individual Concurrent Group Co-treatment   Time In 1344         Time Out 1357         Minutes 13                 Electronically signed by Suresh Mike M.S., CCC-SLP on 12/27/2024 at 3:55 PM

## 2024-12-27 NOTE — PROGRESS NOTES
[facility]   Occupational Therapy Evaluation  Date: 24  Patient Name: Suzan Garcia       Room: 6/2096-01  MRN: [id number]  Account: [id number]   : 1965  (59 y.o.) Gender: female     Discharge Recommendations:  Further Occupational Therapy is recommended upon facility discharge.      OT Equipment Recommendations  Other: TBD    Referring Practitioner: Junior Romano MD  Diagnosis: Stroke-like symptoms        Treatment Diagnosis: Impaired self-care status    Past Medical History:  has a past medical history of Chronic back pain.    Past Surgical History:   has a past surgical history that includes  section and IR BIOPSY LYMPH NODE SUPERFICIAL (2024).    Restrictions  Restrictions/Precautions  Restrictions/Precautions: Fall Risk  Activity Level: Up as Tolerated, Up with Assist  Required Braces or Orthoses?: No      Subjective  Subjective: Pt agreeable and pleasant for therapy. Macedonian speaker with  Virgilio #[id number]  Comments: RN Lay ok'd OT/PT eval with PT Raulito  Pain  Pre-Pain: 3 (\"1=5\" per pt)  Pain Location: Other (Comment) (Back)    Social/Functional History  Social/Functional History  Lives With: Spouse, Daughter (Son also who is 15 years old, grandson)  Type of Home: House  Home Layout: Two level, Bed/Bath upstairs (Bathroom on second floor and basement)  Home Access: Stairs to enter with rails  Entrance Stairs - Number of Steps: 4 MORRO in back door  Entrance Stairs - Rails: Both  Bathroom Shower/Tub: Walk-in shower, Shower chair with back  Bathroom Equipment: Hand-held shower (small bar by toilet)  Home Equipment: Walker - Rolling  Has the patient had two or more falls in the past year or any fall with injury in the past year?: Yes (pt initially denies but then reports 2 falls at home)  Prior Level of Assist for ADLs: Independent  Prior Level of Assist for Homemaking: Independent  Homemaking Responsibilities: Yes (Pt completes all)  Prior Level  Goal 5: Pt will actively participate in 15+ mins of therapeutic exercise/functional activity for improved safety and independence with self-care    Plan  Occupational Therapy Plan  Times Per Week: 4-6  Current Treatment Recommendations: Strengthening, Balance training, Functional mobility training, Endurance training, Pain management, Safety education & training, Patient/Caregiver education & training, Equipment evaluation, education, & procurement, Positioning, Home management training, Coordination training    OT Individual Minutes  OT Individual Minutes  Time In: 0911  Time Out: 0943  Minutes: 32  Time Code Minutes   Timed Code Treatment Minutes: 17 Minutes    Electronically signed by FALLON Syed on 12/27/24 at 11:54 AM EST

## 2024-12-28 PROBLEM — R29.90 STROKE-LIKE SYMPTOMS: Status: RESOLVED | Noted: 2024-12-26 | Resolved: 2024-12-28

## 2024-12-28 PROBLEM — R29.810 FACIAL DROOP: Status: RESOLVED | Noted: 2024-12-27 | Resolved: 2024-12-28

## 2024-12-28 PROBLEM — G61.0 GUILLAIN BARRÉ SYNDROME (HCC): Status: ACTIVE | Noted: 2024-12-28

## 2024-12-28 PROBLEM — G61.0 GUILLAIN BARRÉ SYNDROME (HCC): Status: RESOLVED | Noted: 2024-12-28 | Resolved: 2024-12-28

## 2024-12-28 LAB
ANION GAP SERPL CALCULATED.3IONS-SCNC: 9 MMOL/L (ref 9–16)
BASOPHILS # BLD: 0 K/UL (ref 0–0.2)
BASOPHILS NFR BLD: 1 % (ref 0–2)
BUN SERPL-MCNC: 21 MG/DL (ref 6–20)
CALCIUM SERPL-MCNC: 9.2 MG/DL (ref 8.6–10.4)
CHLORIDE SERPL-SCNC: 104 MMOL/L (ref 98–107)
CO2 SERPL-SCNC: 24 MMOL/L (ref 20–31)
CREAT SERPL-MCNC: 0.6 MG/DL (ref 0.7–1.2)
EOSINOPHIL # BLD: 0 K/UL (ref 0–0.4)
EOSINOPHILS RELATIVE PERCENT: 1 % (ref 0–4)
ERYTHROCYTE [DISTWIDTH] IN BLOOD BY AUTOMATED COUNT: 15 % (ref 11.5–14.9)
GFR, ESTIMATED: >90 ML/MIN/1.73M2
GLUCOSE SERPL-MCNC: 124 MG/DL (ref 74–99)
HCT VFR BLD AUTO: 38.9 % (ref 36–46)
HGB BLD-MCNC: 12.7 G/DL (ref 12–16)
IGA SERPL-MCNC: 280 MG/DL (ref 70–400)
LYMPHOCYTES NFR BLD: 1.2 K/UL (ref 1–4.8)
LYMPHOCYTES RELATIVE PERCENT: 24 % (ref 24–44)
MCH RBC QN AUTO: 27.5 PG (ref 26–34)
MCHC RBC AUTO-ENTMCNC: 32.8 G/DL (ref 31–37)
MCV RBC AUTO: 83.8 FL (ref 80–100)
MONOCYTES NFR BLD: 0.2 K/UL (ref 0.1–1.3)
MONOCYTES NFR BLD: 4 % (ref 1–7)
NEUTROPHILS NFR BLD: 70 % (ref 36–66)
NEUTS SEG NFR BLD: 3.6 K/UL (ref 1.3–9.1)
PLATELET # BLD AUTO: 280 K/UL (ref 150–450)
PMV BLD AUTO: 7 FL (ref 6–12)
POTASSIUM SERPL-SCNC: 4.2 MMOL/L (ref 3.7–5.3)
RBC # BLD AUTO: 4.64 M/UL (ref 4–5.2)
SODIUM SERPL-SCNC: 137 MMOL/L (ref 136–145)
WBC OTHER # BLD: 5.2 K/UL (ref 3.5–11)

## 2024-12-28 PROCEDURE — 2060000000 HC ICU INTERMEDIATE R&B

## 2024-12-28 PROCEDURE — 6370000000 HC RX 637 (ALT 250 FOR IP)

## 2024-12-28 PROCEDURE — 99233 SBSQ HOSP IP/OBS HIGH 50: CPT

## 2024-12-28 PROCEDURE — 2500000003 HC RX 250 WO HCPCS

## 2024-12-28 PROCEDURE — 99233 SBSQ HOSP IP/OBS HIGH 50: CPT | Performed by: PSYCHIATRY & NEUROLOGY

## 2024-12-28 PROCEDURE — 36415 COLL VENOUS BLD VENIPUNCTURE: CPT

## 2024-12-28 PROCEDURE — 6360000002 HC RX W HCPCS

## 2024-12-28 PROCEDURE — 85025 COMPLETE CBC W/AUTO DIFF WBC: CPT

## 2024-12-28 PROCEDURE — 80048 BASIC METABOLIC PNL TOTAL CA: CPT

## 2024-12-28 PROCEDURE — 6360000002 HC RX W HCPCS: Performed by: PSYCHIATRY & NEUROLOGY

## 2024-12-28 RX ORDER — TRAMADOL HYDROCHLORIDE 50 MG/1
50 TABLET ORAL EVERY 8 HOURS PRN
Status: DISCONTINUED | OUTPATIENT
Start: 2024-12-28 | End: 2024-12-30

## 2024-12-28 RX ADMIN — ACETAMINOPHEN 650 MG: 325 TABLET ORAL at 23:13

## 2024-12-28 RX ADMIN — ACETAMINOPHEN 650 MG: 325 TABLET ORAL at 18:24

## 2024-12-28 RX ADMIN — CARBOXYMETHYLCELLULOSE SODIUM 1 DROP: 5 SOLUTION/ DROPS OPHTHALMIC at 09:00

## 2024-12-28 RX ADMIN — ACETAMINOPHEN 650 MG: 325 TABLET ORAL at 02:39

## 2024-12-28 RX ADMIN — CARBOXYMETHYLCELLULOSE SODIUM 1 DROP: 5 SOLUTION/ DROPS OPHTHALMIC at 20:50

## 2024-12-28 RX ADMIN — CARBOXYMETHYLCELLULOSE SODIUM 1 DROP: 5 SOLUTION/ DROPS OPHTHALMIC at 13:00

## 2024-12-28 RX ADMIN — ENOXAPARIN SODIUM 40 MG: 100 INJECTION SUBCUTANEOUS at 09:57

## 2024-12-28 RX ADMIN — SODIUM CHLORIDE, PRESERVATIVE FREE 10 ML: 5 INJECTION INTRAVENOUS at 09:00

## 2024-12-28 RX ADMIN — ROSUVASTATIN 40 MG: 40 TABLET, FILM COATED ORAL at 20:50

## 2024-12-28 RX ADMIN — CARBOXYMETHYLCELLULOSE SODIUM 1 DROP: 5 SOLUTION/ DROPS OPHTHALMIC at 17:11

## 2024-12-28 RX ADMIN — IMMUNE GLOBULIN (HUMAN) 20000 MG: 10 INJECTION INTRAVENOUS; SUBCUTANEOUS at 09:48

## 2024-12-28 RX ADMIN — SODIUM CHLORIDE, PRESERVATIVE FREE 10 ML: 5 INJECTION INTRAVENOUS at 20:51

## 2024-12-28 RX ADMIN — AMLODIPINE BESYLATE 2.5 MG: 2.5 TABLET ORAL at 09:57

## 2024-12-28 ASSESSMENT — PAIN DESCRIPTION - ORIENTATION
ORIENTATION: RIGHT;LEFT
ORIENTATION: MID

## 2024-12-28 ASSESSMENT — PAIN DESCRIPTION - LOCATION
LOCATION: HEAD
LOCATION: GENERALIZED
LOCATION: THROAT

## 2024-12-28 ASSESSMENT — PAIN - FUNCTIONAL ASSESSMENT: PAIN_FUNCTIONAL_ASSESSMENT: ACTIVITIES ARE NOT PREVENTED

## 2024-12-28 ASSESSMENT — PAIN DESCRIPTION - DESCRIPTORS
DESCRIPTORS: ACHING
DESCRIPTORS: SORE
DESCRIPTORS: ACHING

## 2024-12-28 ASSESSMENT — PAIN SCALES - GENERAL
PAINLEVEL_OUTOF10: 3
PAINLEVEL_OUTOF10: 5
PAINLEVEL_OUTOF10: 5

## 2024-12-28 NOTE — PROGRESS NOTES
[facility] Neurology   IN-PATIENT SERVICE      NEUROLOGY PROGRESS  NOTE            Date:   12/28/2024  Patient name:  Suzan Garcia  Date of admission:  12/26/2024  YOB: 1965      Interval History:     Patient's lumbar puncture results came back consistent with albuminocytologic disassociation protein of 155 with negative WBCs.  Overall picture consistent with Guillain-Barré syndrome  Continues to have right greater than left lower extremity weakness as well as left facial weakness.  Complaining of paresthesias.  Given first dose of IVIG early this morning.  Patient endorses feeling a fair amount of improvement after this infusion.    History of Present Illness:     The patient is a 59 y.o. female who presents with Facial Droop and Extremity Weakness  . The patient was seen and examined and the chart was reviewed.  Patient was recently discharged from the hospital after coming in for abdominal pain, findings of generalized lymphadenopathy, status post excisional cervical node biopsy and discharged on 12/24.  Since that time she has noticed that there is numbness in her face, weakness on the left side of her face, in addition endorses numbness and tingling in her fingertips as well as lower extremities.  She has felt like both lower extremities are weaker than normal.  Going back to the chart she also was seen in the ED on 12/16 and at that time had complaints of back pain and numbness and tingling.  It appears that the symptoms have been progressing since that time.  Patient is only Azerbaijani-speaking and much of the history is obtained from her son through translation at bedside.     At this time she is awake alert and oriented.  There is obvious weakness of the full left side of her face upper and lower involvement.  Bilateral upper extremity strength is full.  Left lower extremity mildly weak with more significant weakness in the right lower extremity.  She does endorse numbness and tingling all  recent cervical node biopsy; ?  Immunocompromise state    Plan:     Continue IVIG for total of 5 days.  Patient endorses improvement after her first dose so we will go ahead and continue it.  Respiratory therapy consult for NIF and vital capacity check twice per shift.  PT OT  Likely will need ARU after treatment  Discussed at length with patient via   Discussed with nurse  Discussed with primary team  Will follow        Electronically signed by Lloyd Gandhi DO on 12/28/2024 at 12:12 PM      Lloyd Gandhi DO  [facility]  Neurology

## 2024-12-28 NOTE — PLAN OF CARE
Problem: Discharge Planning  Goal: Discharge to home or other facility with appropriate resources  Outcome: Progressing   Discharge home once medically cleared  Problem: Safety - Adult  Goal: Free from fall injury  Outcome: Progressing   No injury noted this shift.   Problem: Pain  Goal: Verbalizes/displays adequate comfort level or baseline comfort level  Outcome: Progressing  Flowsheets (Taken 12/27/2024 2789)  Verbalizes/displays adequate comfort level or baseline comfort level: Encourage patient to monitor pain and request assistance   Pt noted headache this afternoon. PO tylenol and food administered, helpful. Will continue to monitor.   Problem: Chronic Conditions and Co-morbidities  Goal: Patient's chronic conditions and co-morbidity symptoms are monitored and maintained or improved  Outcome: Progressing   Monitoring.

## 2024-12-28 NOTE — PROGRESS NOTES
no acute cholecystitis.     On labs troponin was elevated 26, repeat 19.  ESR 48.    MRI brain showed no acute intracranial abnormalities, except for some microvascular changes.MRI cervical spine showed mild spinal canal stenosis at C6-C7 and T1-T2.  MRI thoracic spine showed mild spinal stenosis at T8-T9 secondary to posterior disc protrusion.  MRI lumbar spine showed central disc protrusion at L4-L5, moderately narrows the spinal canal.  Posterior disc bulge at L3-L4 and mild spinal canal stenosis.  Mild bilateral neural foraminal narrowing at L3-L4 and L4-L5.     Review of Systems:     Review of Systems  Constitutional:  Positive for chills. Negative for diaphoresis and fatigue.   HENT:  Negative for dental problem, drooling, ear discharge, ear pain and facial swelling.    Eyes:  Positive for visual disturbance. Negative for pain, redness and itching.   Respiratory:  Negative for cough, choking, chest tightness and shortness of breath.    Cardiovascular:  Negative for chest pain, palpitations and leg swelling.   Gastrointestinal:  Negative for abdominal pain, anal bleeding, blood in stool and constipation.   Genitourinary:  Negative for dyspareunia, dysuria, enuresis and flank pain.   Musculoskeletal:  Negative for back pain and gait problem.   Neurological:  Positive for facial asymmetry, speech difficulty, weakness and numbness.     Medications:     Allergies:    Allergies   Allergen Reactions    Naproxen        Current Meds:   Scheduled Meds:    immune globulin  400 mg/kg (Ideal) IntraVENous Daily    carboxymethylcellulose  1 drop Left Eye 4x Daily    [Held by provider] amLODIPine  2.5 mg Oral Daily    sodium chloride flush  5-40 mL IntraVENous 2 times per day    enoxaparin  40 mg SubCUTAneous Daily    rosuvastatin  40 mg Oral Nightly     Continuous Infusions:    sodium chloride       PRN Meds: perflutren lipid microspheres, sodium chloride flush, sodium chloride flush, sodium chloride, potassium chloride  140-200  Passed bedside swallow and speech evaluation, diet resumed  CSF was positive for albuminocytologic dissociation, suggestive of GBS  Started IVIG  Neurology following     Hypertension  On amlodipine at home, on hold for now  Blood pressure goal 140-200  Hydralazine as needed above      Diet: Regular diet  DVT prophylaxis: Lovenox    Plan will be discussed with the attending    Junior Romano MD  PGY I Transitional Year Resident  12/28/2024 8:34 AM     Attending Physician Statement  I have discussed the care of Suzan Garcia and I have examined the patient myself and taken ROS and HPI, including pertinent history and exam findings, with the resident. I have reviewed the key elements of all parts of the encounter with the resident.  I agree with the assessment, plan and orders as documented by the resident.    Bilateral lower extremity weakness left more than right with MRI showing disc bulge spine surgery   S/p LP s/p GBS  Started on ivig  Neurology on board.  Patient seen and examined at bedside.  Recommend to continue monitoring Lino and vital capacity.  Left-sided facial droop?  Unsure if it is chronic or acute.  No dysphagia.  Continue work with respiratory therapy and speech therapy currently no signs of aspiration.  Will continue to monitor closely  If patient's upper extremity weakness continues to grow we will likely need to transfer to ICU for closer monitoring.    Electronically signed by Edita Hawthorne MD

## 2024-12-28 NOTE — PROGRESS NOTES
[id number] language services session code with Carlos Alberto initiated by resident for assessment.   All answers answered and patient understands plan of care.

## 2024-12-28 NOTE — PROGRESS NOTES
While writer was explaining to patient side effects of new medication, immune globulin, patient stated they already had a sore throat. Writer messaged Dr Gandhi via secure message and asked if still okay to give immune globulin.     0545- Per Dr Gandhi is okay with starting the med

## 2024-12-28 NOTE — PLAN OF CARE
Problem: Discharge Planning  Goal: Discharge to home or other facility with appropriate resources  12/28/2024 0412 by Candida Escalera RN  Outcome: Progressing  Flowsheets (Taken 12/28/2024 0412)  Discharge to home or other facility with appropriate resources: Identify barriers to discharge with patient and caregiver     Problem: Safety - Adult  Goal: Free from fall injury  12/28/2024 0412 by Candida Escalera, RN  Outcome: Progressing  Flowsheets (Taken 12/28/2024 0412)  Free From Fall Injury:   Instruct family/caregiver on patient safety   Based on caregiver fall risk screen, instruct family/caregiver to ask for assistance with transferring infant if caregiver noted to have fall risk factors  Note: The patient remained free from falls this shift, call light within reach, bed in locked and lowest position.  Side rails up x2.      Problem: Pain  Goal: Verbalizes/displays adequate comfort level or baseline comfort level  12/28/2024 0412 by Candida Escalera, RN  Outcome: Progressing     Problem: Chronic Conditions and Co-morbidities  Goal: Patient's chronic conditions and co-morbidity symptoms are monitored and maintained or improved  12/28/2024 0412 by Candida Escalera, RN  Outcome: Progressing

## 2024-12-29 LAB
ACE SERPL-CCNC: 27 U/L (ref 16–85)
ANION GAP SERPL CALCULATED.3IONS-SCNC: 9 MMOL/L (ref 9–16)
B BURGDOR IGG SER QL IB: NEGATIVE
B BURGDOR IGM SER QL IB: NEGATIVE
BASOPHILS # BLD: 0.07 K/UL (ref 0–0.2)
BASOPHILS NFR BLD: 1 % (ref 0–2)
BUN SERPL-MCNC: 28 MG/DL (ref 6–20)
CALCIUM SERPL-MCNC: 9.1 MG/DL (ref 8.6–10.4)
CHLORIDE SERPL-SCNC: 103 MMOL/L (ref 98–107)
CO2 SERPL-SCNC: 26 MMOL/L (ref 20–31)
CREAT SERPL-MCNC: 0.7 MG/DL (ref 0.7–1.2)
EOSINOPHIL # BLD: 0.48 K/UL (ref 0–0.4)
EOSINOPHILS RELATIVE PERCENT: 7 % (ref 0–4)
ERYTHROCYTE [DISTWIDTH] IN BLOOD BY AUTOMATED COUNT: 15 % (ref 11.5–14.9)
GFR, ESTIMATED: >90 ML/MIN/1.73M2
GLUCOSE SERPL-MCNC: 116 MG/DL (ref 74–99)
HCT VFR BLD AUTO: 38.5 % (ref 36–46)
HGB BLD-MCNC: 12.9 G/DL (ref 12–16)
LYMPHOCYTES NFR BLD: 1.66 K/UL (ref 1–4.8)
LYMPHOCYTES RELATIVE PERCENT: 24 % (ref 24–44)
MCH RBC QN AUTO: 27.9 PG (ref 26–34)
MCHC RBC AUTO-ENTMCNC: 33.5 G/DL (ref 31–37)
MCV RBC AUTO: 83.4 FL (ref 80–100)
MONOCYTES NFR BLD: 0.62 K/UL (ref 0.1–1.3)
MONOCYTES NFR BLD: 9 % (ref 1–7)
MORPHOLOGY: ABNORMAL
NEUTROPHILS NFR BLD: 59 % (ref 36–66)
NEUTS SEG NFR BLD: 4.07 K/UL (ref 1.3–9.1)
PLATELET # BLD AUTO: 288 K/UL (ref 150–450)
PMV BLD AUTO: 7.1 FL (ref 6–12)
POTASSIUM SERPL-SCNC: 4.4 MMOL/L (ref 3.7–5.3)
RBC # BLD AUTO: 4.61 M/UL (ref 4–5.2)
SODIUM SERPL-SCNC: 138 MMOL/L (ref 136–145)
WBC OTHER # BLD: 6.9 K/UL (ref 3.5–11)

## 2024-12-29 PROCEDURE — 94799 UNLISTED PULMONARY SVC/PX: CPT

## 2024-12-29 PROCEDURE — 85025 COMPLETE CBC W/AUTO DIFF WBC: CPT

## 2024-12-29 PROCEDURE — 6370000000 HC RX 637 (ALT 250 FOR IP)

## 2024-12-29 PROCEDURE — 2500000003 HC RX 250 WO HCPCS

## 2024-12-29 PROCEDURE — 36415 COLL VENOUS BLD VENIPUNCTURE: CPT

## 2024-12-29 PROCEDURE — 80048 BASIC METABOLIC PNL TOTAL CA: CPT

## 2024-12-29 PROCEDURE — 6360000002 HC RX W HCPCS

## 2024-12-29 PROCEDURE — 2060000000 HC ICU INTERMEDIATE R&B

## 2024-12-29 PROCEDURE — 99232 SBSQ HOSP IP/OBS MODERATE 35: CPT | Performed by: INTERNAL MEDICINE

## 2024-12-29 PROCEDURE — 99232 SBSQ HOSP IP/OBS MODERATE 35: CPT | Performed by: PSYCHIATRY & NEUROLOGY

## 2024-12-29 PROCEDURE — 6360000002 HC RX W HCPCS: Performed by: PSYCHIATRY & NEUROLOGY

## 2024-12-29 RX ORDER — LIDOCAINE 4 G/G
1 PATCH TOPICAL DAILY
Status: DISCONTINUED | OUTPATIENT
Start: 2024-12-29 | End: 2025-01-03 | Stop reason: HOSPADM

## 2024-12-29 RX ORDER — OXYCODONE AND ACETAMINOPHEN 5; 325 MG/1; MG/1
1 TABLET ORAL ONCE
Status: DISCONTINUED | OUTPATIENT
Start: 2024-12-29 | End: 2024-12-29

## 2024-12-29 RX ADMIN — IMMUNE GLOBULIN (HUMAN) 20000 MG: 10 INJECTION INTRAVENOUS; SUBCUTANEOUS at 09:55

## 2024-12-29 RX ADMIN — ACETAMINOPHEN 650 MG: 325 TABLET ORAL at 15:40

## 2024-12-29 RX ADMIN — AMLODIPINE BESYLATE 2.5 MG: 2.5 TABLET ORAL at 10:00

## 2024-12-29 RX ADMIN — TRAMADOL HYDROCHLORIDE 50 MG: 50 TABLET, COATED ORAL at 02:57

## 2024-12-29 RX ADMIN — ACETAMINOPHEN 650 MG: 325 TABLET ORAL at 23:27

## 2024-12-29 RX ADMIN — SODIUM CHLORIDE, PRESERVATIVE FREE 10 ML: 5 INJECTION INTRAVENOUS at 10:00

## 2024-12-29 RX ADMIN — CARBOXYMETHYLCELLULOSE SODIUM 1 DROP: 5 SOLUTION/ DROPS OPHTHALMIC at 23:21

## 2024-12-29 RX ADMIN — ROSUVASTATIN 40 MG: 40 TABLET, FILM COATED ORAL at 23:21

## 2024-12-29 RX ADMIN — ENOXAPARIN SODIUM 40 MG: 100 INJECTION SUBCUTANEOUS at 10:00

## 2024-12-29 RX ADMIN — SODIUM CHLORIDE, PRESERVATIVE FREE 10 ML: 5 INJECTION INTRAVENOUS at 23:21

## 2024-12-29 RX ADMIN — ACETAMINOPHEN 650 MG: 325 TABLET ORAL at 10:00

## 2024-12-29 ASSESSMENT — PAIN DESCRIPTION - DESCRIPTORS
DESCRIPTORS: ACHING

## 2024-12-29 ASSESSMENT — PAIN - FUNCTIONAL ASSESSMENT
PAIN_FUNCTIONAL_ASSESSMENT: ACTIVITIES ARE NOT PREVENTED
PAIN_FUNCTIONAL_ASSESSMENT: ACTIVITIES ARE NOT PREVENTED

## 2024-12-29 ASSESSMENT — PAIN DESCRIPTION - LOCATION
LOCATION: GENERALIZED
LOCATION: BACK
LOCATION: BACK
LOCATION: GENERALIZED

## 2024-12-29 ASSESSMENT — PAIN SCALES - GENERAL
PAINLEVEL_OUTOF10: 3
PAINLEVEL_OUTOF10: 6
PAINLEVEL_OUTOF10: 10
PAINLEVEL_OUTOF10: 5
PAINLEVEL_OUTOF10: 0

## 2024-12-29 ASSESSMENT — PAIN DESCRIPTION - ORIENTATION
ORIENTATION: LOWER
ORIENTATION: RIGHT;LEFT;MID
ORIENTATION: RIGHT;LEFT;MID

## 2024-12-29 NOTE — PLAN OF CARE
Problem: Discharge Planning  Goal: Discharge to home or other facility with appropriate resources  Outcome: Progressing     Problem: Safety - Adult  Goal: Free from fall injury  Outcome: Progressing  Flowsheets (Taken 12/29/2024 3710)  Free From Fall Injury:   Instruct family/caregiver on patient safety   Based on caregiver fall risk screen, instruct family/caregiver to ask for assistance with transferring infant if caregiver noted to have fall risk factors  Note: The patient remained free from falls this shift, call light within reach, bed in locked and lowest position.  Side rails up x2.      Problem: Pain  Goal: Verbalizes/displays adequate comfort level or baseline comfort level  Outcome: Progressing     Problem: Chronic Conditions and Co-morbidities  Goal: Patient's chronic conditions and co-morbidity symptoms are monitored and maintained or improved  Outcome: Progressing

## 2024-12-29 NOTE — PROGRESS NOTES
[facility]  IN-PATIENT SERVICE  [facility]    PROGRESS NOTE             12/29/2024    9:44 AM    Name:   Suzan Garcia  MRN:     [id number]     Acct:      [id number]   Room:   2096/2096-01   Day:  3  Admit Date:  12/26/2024 11:23 AM    PCP:  No primary care provider on file.  Code Status:  Full Code    Subjective:     C/C:   Chief Complaint   Patient presents with    Facial Droop    Extremity Weakness     Interval History Status: not changed.    Patient was seen and examined at bedside.  Vitally stable.  No acute event overnight.     Patient states that her weakness is improving.  Still having left facial, left upper extremity, and bilateral lower extremity weakness.    CSF studies suggestive of GBS, IVIG for 5 days.  Second dose of IVIG today    Brief History:     The patient is a 59 y.o.   /  female who presents withFacial Droop and Extremity Weakness   and she is admitted to the hospital for the management of  Stroke like symptoms.      She has a past medical history of back pain and hypertension.  She came in today with a complaint of left facial droop and bilateral lower extremity  weakness.  She started having the symptoms since 7 PM on 12/25/2024.  Symptoms progressively got worsened.  She does not complain of any headache, visual changes, chest pain, shortness of breath, abdominal and bowel control.  She did report difficulty in walking.     CT head and CTA head and neck showed no acute abnormality.  MRI brain, and pan spine ordered.     She was recently discharged from [facility].  During that time, she presented with back pain radiating to frontal abdominal wall.  ALP was elevated.  Left cervical lymph node biopsy was done by IR.  HIDA scan revealed no acute cholecystitis.     On labs troponin was elevated 26, repeat 19.  ESR 48.    MRI brain showed no acute intracranial abnormalities, except for some microvascular changes.MRI  administration of intravenous contrast. Multiplanar reformatted images are provided for review. Automated exposure control, iterative reconstruction, and/or weight based adjustment of the mA/kV was utilized to reduce the radiation dose to as low as reasonably achievable. COMPARISON: Chest CT of 12/16/2024 HISTORY: ORDERING SYSTEM PROVIDED HISTORY: acute on chronic left lower back pain, radiation to abdomen and pelvis TECHNOLOGIST PROVIDED HISTORY: acute on chronic left lower back pain, radiation to abdomen and pelvis Decision Support Exception - unselect if not a suspected or confirmed emergency medical condition->Emergency Medical Condition (MA) Reason for Exam: acute on chronic left lower back pain, radiation to abdomen and pelvis Additional signs and symptoms: Pt. states rt. sided abdominal pain, nausea, emesis x1 week FINDINGS: Lower Chest: No acute pulmonary infiltrates.  Partially visualize lymph nodes in the lower axillary region as demonstrated on previous chest CT. Organs: Liver is mildly enlarged in size with normal density.  Nonspecific low-density lesion in the left hepatic lobe measuring 15 mm..  No evidence of intrahepatic ductal dilatation.    Spleen is normal size.  The gallbladder is unremarkable.  Both adrenal glands are normal.  Pancreas is normal in appearance. . The kidneys are  normal in size and attenuation without evidence of hydronephrosis or renal calculi. GI/Bowel: The visualized bowel and mesentery show no mass lesions. Normal appendix Pelvis: No intrapelvic mass is identified.  Bladder and rectum are intact. Peritoneum/Retroperitoneum: No free fluid.  Multiple nonspecific mesenteric, pelvic, inguinal and periaortic lymph nodes measuring up to 15 mm.  No evidence of pneumoperitoneum. Bones/Soft Tissues: . The abdominal and pelvic walls are unremarkable.  .  No acute bony abnormalities.     1. No acute intra-abdominal or intrapelvic abnormalities. 2. Mild hepatomegaly. 3. Nonspecific

## 2024-12-29 NOTE — PROGRESS NOTES
[facility]   IN-PATIENT SERVICE      NEUROLOGY PROGRESS  NOTE            Date:   12/29/2024  Patient name:  Suzan Garcia  Date of admission:  12/26/2024  YOB: 1965      Interval History:     Feeling better  S/p 2 doses IVIG.   No new complaints.    History of Present Illness:     The patient is a 59 y.o. female who presents with Facial Droop and Extremity Weakness  . The patient was seen and examined and the chart was reviewed.  Patient was recently discharged from the hospital after coming in for abdominal pain, findings of generalized lymphadenopathy, status post excisional cervical node biopsy and discharged on 12/24.  Since that time she has noticed that there is numbness in her face, weakness on the left side of her face, in addition endorses numbness and tingling in her fingertips as well as lower extremities.  She has felt like both lower extremities are weaker than normal.  Going back to the chart she also was seen in the ED on 12/16 and at that time had complaints of back pain and numbness and tingling.  It appears that the symptoms have been progressing since that time.  Patient is only Turkmen-speaking and much of the history is obtained from her son through translation at bedside.     At this time she is awake alert and oriented.  There is obvious weakness of the full left side of her face upper and lower involvement.  Bilateral upper extremity strength is full.  Left lower extremity mildly weak with more significant weakness in the right lower extremity.  She does endorse numbness and tingling all throughout although sensory exam is fairly normal.  She is hyporeflexic throughout.     MRI of the brain without acute abnormalities.  MRI cervical spine without any significant stenosis.  MRI thoracic spine with mild canal stenosis at T8-9 secondary to posterior disc protrusion.  MRI lumbar spine central disc protrusion at L4-5 moderately narrowing spinal canal.  Posterior disc  Patient continues to endorse improvement in her symptoms.   Respiratory therapy consult for NIF and vital capacity check twice per shift. Most recent NIF -60, VC 2.3   PT OT  Likely will need ARU after treatment  Will follow        Electronically signed by Lloyd Gandhi DO on 12/29/2024 at 12:46 PM      Lloyd Gandhi DO  [facility]  Neurology

## 2024-12-30 LAB
ANION GAP SERPL CALCULATED.3IONS-SCNC: 7 MMOL/L (ref 9–16)
BASOPHILS # BLD: 0.12 K/UL (ref 0–0.2)
BASOPHILS NFR BLD: 2 % (ref 0–2)
BUN SERPL-MCNC: 20 MG/DL (ref 6–20)
CALCIUM SERPL-MCNC: 8.8 MG/DL (ref 8.6–10.4)
CASE NUMBER:: NORMAL
CHLORIDE SERPL-SCNC: 104 MMOL/L (ref 98–107)
CO2 SERPL-SCNC: 26 MMOL/L (ref 20–31)
CREAT SERPL-MCNC: 0.7 MG/DL (ref 0.7–1.2)
CSF ISOELECTRIC FOCUSING INTERPRETATION: NORMAL
EOSINOPHIL # BLD: 1.2 K/UL (ref 0–0.4)
EOSINOPHILS RELATIVE PERCENT: 20 % (ref 0–4)
ERYTHROCYTE [DISTWIDTH] IN BLOOD BY AUTOMATED COUNT: 14.5 % (ref 11.5–14.9)
GFR, ESTIMATED: >90 ML/MIN/1.73M2
GLUCOSE SERPL-MCNC: 125 MG/DL (ref 74–99)
HCT VFR BLD AUTO: 37.2 % (ref 36–46)
HGB BLD-MCNC: 12.4 G/DL (ref 12–16)
LYMPHOCYTES NFR BLD: 1.74 K/UL (ref 1–4.8)
LYMPHOCYTES RELATIVE PERCENT: 29 % (ref 24–44)
MCH RBC QN AUTO: 27.8 PG (ref 26–34)
MCHC RBC AUTO-ENTMCNC: 33.4 G/DL (ref 31–37)
MCV RBC AUTO: 83.3 FL (ref 80–100)
MICROORGANISM SPEC CULT: NORMAL
MICROORGANISM/AGENT SPEC: NORMAL
MONOCYTES NFR BLD: 0.3 K/UL (ref 0.1–1.3)
MONOCYTES NFR BLD: 5 % (ref 1–7)
MORPHOLOGY: NORMAL
NEUTROPHILS NFR BLD: 44 % (ref 36–66)
NEUTS SEG NFR BLD: 2.64 K/UL (ref 1.3–9.1)
OLIGOCLONAL BANDS CSF IEF: 0 BANDS (ref 0–1)
OLIGOCLONAL BANDS: NEGATIVE
PLATELET # BLD AUTO: 291 K/UL (ref 150–450)
PMV BLD AUTO: 7.3 FL (ref 6–12)
POTASSIUM SERPL-SCNC: 3.8 MMOL/L (ref 3.7–5.3)
RBC # BLD AUTO: 4.47 M/UL (ref 4–5.2)
SERVICE CMNT-IMP: NORMAL
SODIUM SERPL-SCNC: 137 MMOL/L (ref 136–145)
SPECIMEN DESCRIPTION: NORMAL
SPECIMEN DESCRIPTION: NORMAL
SURGICAL PATHOLOGY REPORT: NORMAL
WBC OTHER # BLD: 6 K/UL (ref 3.5–11)

## 2024-12-30 PROCEDURE — 2060000000 HC ICU INTERMEDIATE R&B

## 2024-12-30 PROCEDURE — 97116 GAIT TRAINING THERAPY: CPT

## 2024-12-30 PROCEDURE — 99232 SBSQ HOSP IP/OBS MODERATE 35: CPT | Performed by: PSYCHIATRY & NEUROLOGY

## 2024-12-30 PROCEDURE — 85025 COMPLETE CBC W/AUTO DIFF WBC: CPT

## 2024-12-30 PROCEDURE — 97129 THER IVNTJ 1ST 15 MIN: CPT

## 2024-12-30 PROCEDURE — 6370000000 HC RX 637 (ALT 250 FOR IP)

## 2024-12-30 PROCEDURE — 80048 BASIC METABOLIC PNL TOTAL CA: CPT

## 2024-12-30 PROCEDURE — 97110 THERAPEUTIC EXERCISES: CPT

## 2024-12-30 PROCEDURE — 36415 COLL VENOUS BLD VENIPUNCTURE: CPT

## 2024-12-30 PROCEDURE — 6360000002 HC RX W HCPCS: Performed by: PSYCHIATRY & NEUROLOGY

## 2024-12-30 PROCEDURE — 99232 SBSQ HOSP IP/OBS MODERATE 35: CPT | Performed by: INTERNAL MEDICINE

## 2024-12-30 PROCEDURE — 2500000003 HC RX 250 WO HCPCS

## 2024-12-30 PROCEDURE — 6360000002 HC RX W HCPCS

## 2024-12-30 PROCEDURE — 97130 THER IVNTJ EA ADDL 15 MIN: CPT

## 2024-12-30 PROCEDURE — 94799 UNLISTED PULMONARY SVC/PX: CPT

## 2024-12-30 RX ORDER — MINERAL OIL AND WHITE PETROLATUM 150; 830 MG/G; MG/G
OINTMENT OPHTHALMIC
Status: DISCONTINUED | OUTPATIENT
Start: 2024-12-30 | End: 2025-01-03 | Stop reason: HOSPADM

## 2024-12-30 RX ORDER — KETOROLAC TROMETHAMINE 30 MG/ML
15 INJECTION, SOLUTION INTRAMUSCULAR; INTRAVENOUS EVERY 8 HOURS
Status: DISCONTINUED | OUTPATIENT
Start: 2024-12-30 | End: 2025-01-03 | Stop reason: HOSPADM

## 2024-12-30 RX ADMIN — TRAMADOL HYDROCHLORIDE 50 MG: 50 TABLET, COATED ORAL at 03:19

## 2024-12-30 RX ADMIN — IMMUNE GLOBULIN (HUMAN) 20000 MG: 10 INJECTION INTRAVENOUS; SUBCUTANEOUS at 08:59

## 2024-12-30 RX ADMIN — KETOROLAC TROMETHAMINE 15 MG: 30 INJECTION, SOLUTION INTRAMUSCULAR at 18:13

## 2024-12-30 RX ADMIN — KETOROLAC TROMETHAMINE 15 MG: 30 INJECTION, SOLUTION INTRAMUSCULAR at 11:18

## 2024-12-30 RX ADMIN — AMLODIPINE BESYLATE 2.5 MG: 2.5 TABLET ORAL at 08:50

## 2024-12-30 RX ADMIN — CARBOXYMETHYLCELLULOSE SODIUM 1 DROP: 5 SOLUTION/ DROPS OPHTHALMIC at 20:45

## 2024-12-30 RX ADMIN — CARBOXYMETHYLCELLULOSE SODIUM 1 DROP: 5 SOLUTION/ DROPS OPHTHALMIC at 09:02

## 2024-12-30 RX ADMIN — ROSUVASTATIN 40 MG: 40 TABLET, FILM COATED ORAL at 20:44

## 2024-12-30 RX ADMIN — ENOXAPARIN SODIUM 40 MG: 100 INJECTION SUBCUTANEOUS at 08:51

## 2024-12-30 RX ADMIN — CARBOXYMETHYLCELLULOSE SODIUM 1 DROP: 5 SOLUTION/ DROPS OPHTHALMIC at 18:13

## 2024-12-30 RX ADMIN — CARBOXYMETHYLCELLULOSE SODIUM 1 DROP: 5 SOLUTION/ DROPS OPHTHALMIC at 13:56

## 2024-12-30 RX ADMIN — SODIUM CHLORIDE, PRESERVATIVE FREE 10 ML: 5 INJECTION INTRAVENOUS at 21:06

## 2024-12-30 RX ADMIN — SODIUM CHLORIDE, PRESERVATIVE FREE 10 ML: 5 INJECTION INTRAVENOUS at 09:03

## 2024-12-30 ASSESSMENT — PAIN DESCRIPTION - LOCATION
LOCATION: BACK

## 2024-12-30 ASSESSMENT — PAIN DESCRIPTION - DESCRIPTORS
DESCRIPTORS: ACHING
DESCRIPTORS: ACHING
DESCRIPTORS: THROBBING

## 2024-12-30 ASSESSMENT — PAIN SCALES - WONG BAKER: WONGBAKER_NUMERICALRESPONSE: NO HURT

## 2024-12-30 ASSESSMENT — PAIN SCALES - GENERAL
PAINLEVEL_OUTOF10: 2
PAINLEVEL_OUTOF10: 4
PAINLEVEL_OUTOF10: 9

## 2024-12-30 NOTE — ADT AUTH CERT
Utilization Reviews       Physician Advisor recommendation IP   Last updated by Tracy Mcknight on 2024 1733     Review Status Created By   In Primary Tracy Mcknight       Review Type Associated Date   -- 2024      Criteria Review   SLR Status keep inpatient  Name: Suzan Garcia  : 1965  CSN:  INSURANCE: [facility]    Date of admission: 24  Date of discharge:    Current status: Inpatient    RECOMMENDATION:  KEEP CURRENT STATUS. No status change indicated.    RATIONALE: Acute ischemic stroke versus spinal cord pathology  · Presented with bilateral lower extremity weakness left more than right associated with loss of sensation in bilateral lower and extremities  · CT head and CTA head and neck showed no acute intracranial abnormalities  · MRI brain showed no acute intracranial abnormalities, except for some microvascular changes.M        Clinical summary 59 y.o. female with  Vitals noted  Labs and Imaging    This chart was reviewed at 3:45 PM EST on 2024    Felipe Hutton  Physician Advisor  [facility]         Day 2 2024   Last updated by Tracy Mcknight on 2024 1540     Review Status Created By   In Primary Tracy Mcknight       Review Type Associated Date   -- 2024      Criteria Review   DATE: 2024  TYPE OF BED: Intermediate Care     PERTINENT UPDATES:  Scheduled Acyclovir IV and prednisone  Differential diagnosis of GBS syndrome demyelinating disease per provider, Lumbar puncture completed for further evaluation  Orthopedic surgery consulted  OT/PT/SLP evals      RELEVANT BASELINES:  RA     VITALS:  98.4 (36.9)         16          70          170/91  97% room air  WEIGHT: 72.6kg  BMI: 30.7     ABNL/PERTINENT LABS/RADIOLOGY/DIAGNOSTIC STUDIES:    Latest Reference Range & Units 24 06:03   BUN,BUNPL 6 - 20 mg/dL 23 (H)   Glucose 74 - 99 mg/dL 111 (H)   RDW 11.5 - 14.9 % 15.2 (H)   Eosinophils % 0 - 4 % 12 (H)   Basophils % 0 - 2 % 3 (H)

## 2024-12-30 NOTE — PLAN OF CARE
Problem: Discharge Planning  Goal: Discharge to home or other facility with appropriate resources  Outcome: Progressing     Problem: Safety - Adult  Goal: Free from fall injury  Outcome: Progressing  Note: Pt assisted with staff this shift when transferring to the bathroom.      Problem: Pain  Goal: Verbalizes/displays adequate comfort level or baseline comfort level  Outcome: Progressing  Flowsheets (Taken 12/30/2024 9872)  Verbalizes/displays adequate comfort level or baseline comfort level:   Assess pain using appropriate pain scale   Administer analgesics based on type and severity of pain and evaluate response  Note: Pt medicated with PRN pain medication.      Problem: Chronic Conditions and Co-morbidities  Goal: Patient's chronic conditions and co-morbidity symptoms are monitored and maintained or improved  Outcome: Progressing     Problem: Skin/Tissue Integrity  Goal: Absence of new skin breakdown  Description: 1.  Monitor for areas of redness and/or skin breakdown  2.  Assess vascular access sites hourly  3.  Every 4-6 hours minimum:  Change oxygen saturation probe site  4.  Every 4-6 hours:  If on nasal continuous positive airway pressure, respiratory therapy assess nares and determine need for appliance change or resting period.  Outcome: Progressing

## 2024-12-30 NOTE — PROGRESS NOTES
Speech Language Pathology  ST PROGRESSIVE CARE    Cognitive Treatment Note    Date: 2024  Patient’s Name: Suzan Garcia  MRN: [id number]  Diagnosis:   Patient Active Problem List   Diagnosis Code    ASCUS with positive high risk HPV cervical R87.610, R87.810    Screening mammogram for breast cancer Z12.31    Sleep disturbance G47.9    Cholelithiasis without cholangitis K80.20    Biliary colic K80.50    Cervical lymphadenopathy R59.0    Lymphadenopathy, generalized R59.1    Thrombocytopenia (HCC) D69.6    Leg weakness, bilateral R29.898    Bell's palsy G51.0    Guillain Barré syndrome (HCC) G61.0       Pain Rating: None reported    Cognitive Treatment    Treatment time: 7747-6132      Subjective: [x] Alert [x] Cooperative     [] Confused     [] Agitated    [] Lethargic      Objective/Assessment:    Orientation: Oriented to name, , age, month, CHEPE, and place independently. Stated \"I don't know\" for current year.    Recall: Immediate recall: 3/3, /5, 3/3.  Short-term recall: 2/3, 3/3.    Organization: Sequencing 4-steps: 3/4.     Problem Solving/Reasoning: Word associations: 3/3.  Antonyms: .  Inductive reasoning: 3/4.  Similarities/differences: .  Task insight: 3/3.  Thought flexibility: 3/3.  Convergent thinkin/1 increased to 1/1 with min verbal cues.   Word deductions: Pt. Stated \"I don't know\" despite several repetitions and cues.   Divergent naming: +7 in 30 secs.    Add goals to POC:   Goal 1: Pt. Will recall 3-5 units with distractions with 90% accuracy.  Goal 2: Pt. Will utilize memory compensatory strategies to aid in recall.  Goal 3: Pt. Will complete thought organizational tasks with 90% accuracy.  Goal 4: Pt. Will complete verbal reasoning tasks with 90% accuracy.  Goal 5: Pt. Will recall orientation information with 90% accuracy.    Other: Interpretor used through language services for completion of cognitive assessment. Pt. Reports she feels like she is \"running over her words\"

## 2024-12-30 NOTE — PLAN OF CARE
Problem: Discharge Planning  Goal: Discharge to home or other facility with appropriate resources  12/30/2024 1523 by Reina Salas, RN  Outcome: Progressing  D/C barriers: last dose of IVIG 1/1. Pending PM&R consult     Problem: Safety - Adult  Goal: Free from fall injury  12/30/2024 1523 by Reina Salas, RN  Outcome: Progressing  Pt assessed as a fall risk this shift. Remains free from falls and accidental injury at this time. Fall precautions in place. Floor free from obstacles, and bed is locked and in lowest position. Adequate lighting provided. Pt encouraged to call before getting OOB for any need. Bed alarm activated.      Problem: Pain  Goal: Verbalizes/displays adequate comfort level or baseline comfort level  12/30/2024 1523 by Reina Salas, RN  Outcome: Progressing  Pt medicated with pain medication prn. Assessed all pain characteristics including level, type, and location.  Non-pharmacologic interventions offered to pt as well.       Problem: Neurosensory - Adult  Goal: Achieves stable or improved neurological status  Outcome: Progressing  Pt states weakness and numbness and tingling is improving

## 2024-12-30 NOTE — PROGRESS NOTES
[facility] Neurology   IN-PATIENT SERVICE      NEUROLOGY PROGRESS  NOTE            Date:   12/30/2024  Patient name:  Suzan Garcia  Date of admission:  12/26/2024  YOB: 1965      Interval History:     No acute events.  Spoke with patient using interpreting services.  She states that she continues to feel weakness is improving with IVIG.  No new neurological symptoms.    History of Present Illness:     Per my partner:    The patient is a 59 y.o. female who presents with Facial Droop and Extremity Weakness  . The patient was seen and examined and the chart was reviewed.  Patient was recently discharged from the hospital after coming in for abdominal pain, findings of generalized lymphadenopathy, status post excisional cervical node biopsy and discharged on 12/24.  Since that time she has noticed that there is numbness in her face, weakness on the left side of her face, in addition endorses numbness and tingling in her fingertips as well as lower extremities.  She has felt like both lower extremities are weaker than normal.  Going back to the chart she also was seen in the ED on 12/16 and at that time had complaints of back pain and numbness and tingling.  It appears that the symptoms have been progressing since that time.  Patient is only Nauruan-speaking and much of the history is obtained from her son through translation at bedside.     At this time she is awake alert and oriented.  There is obvious weakness of the full left side of her face upper and lower involvement.  Bilateral upper extremity strength is full.  Left lower extremity mildly weak with more significant weakness in the right lower extremity.  She does endorse numbness and tingling all throughout although sensory exam is fairly normal.  She is hyporeflexic throughout.     MRI of the brain without acute abnormalities.  MRI cervical spine without any significant stenosis.  MRI thoracic spine with mild canal stenosis at T8-9 secondary  PROVIDED HISTORY:  stroke symptoms  Decision Support Exception - unselect if not a suspected or confirmed  emergency medical condition->Emergency Medical Condition (MA)  Reason for Exam: stroke symptoms    FINDINGS:  INTRACRANIAL STRUCTURES/VENTRICLES: No acute infarct or mass.  Scattered  patchy foci of T2/FLAIR hyperintensity in the cerebral white matter,  nonspecific but generally ascribed to sequela of chronic microvascular  ischemia.  No mass effect or midline shift. No evidence of an acute  intracranial hemorrhage.  The ventricles and sulci are normal in size and  configuration.  The sellar/suprasellar regions appear unremarkable.  The  normal signal voids within the major intracranial vessels appear maintained.    ORBITS: The visualized portion of the orbits demonstrate no acute abnormality.    SINUSES: The visualized paranasal sinuses and mastoid air cells demonstrate  no acute abnormality.    BONES/SOFT TISSUES: The bone marrow signal intensity appears normal. The soft  tissues demonstrate no acute abnormality.    Impression  1.  No acute intracranial abnormality.  2. Mild chronic white matter microvascular ischemic changes.      Results for orders placed during the hospital encounter of 12/26/24    CT HEAD WO CONTRAST    Addendum 12/26/2024 11:59 AM  ADDENDUM:  Findings were communicated to Dr. KEYUR MITCHELL by the CORE team on  12/26/2024 at 11:52 am.    Narrative  EXAMINATION:  CT OF THE HEAD WITHOUT CONTRAST  12/26/2024 11:28 am    TECHNIQUE:  CT of the head was performed without the administration of intravenous  contrast. Automated exposure control, iterative reconstruction, and/or weight  based adjustment of the mA/kV was utilized to reduce the radiation dose to as  low as reasonably achievable.    COMPARISON:  None.    HISTORY:  ORDERING SYSTEM PROVIDED HISTORY: Stroke Symptoms  TECHNOLOGIST PROVIDED HISTORY:    Stroke Symptoms  Decision Support Exception - unselect if not a suspected or

## 2024-12-30 NOTE — PROGRESS NOTES
[facility]   Physical Therapy Treatment  Date: 24  Patient Name: Suzan Garcia       Room: 6/2096-01  MRN: [id number]  Account: [id number]   : 1965  (59 y.o.) Gender: female       PT Equipment Recommendations  Equipment Needed:  (TBD)    General  Patient assessed for rehabilitation services?: Yes  Family/Caregiver Present: No  Follows Commands: Within Functional Limits    Past Medical History:  has a past medical history of Chronic back pain.  Past Surgical History:   has a past surgical history that includes  section and IR BIOPSY LYMPH NODE SUPERFICIAL (2024).    Restrictions  Restrictions/Precautions  Restrictions/Precautions: Fall Risk, General Precautions  Activity Level: Up as Tolerated, Up with Assist  Required Braces or Orthoses?: No       Subjective  Pt pleasant and agreeable to PT.     General Comments:  Session ID: [id number]  Language: Mosotho   ID: #[id number]   Name: Austin     Pain  Pre-Pain: 5  Pain Location: Right (LE)  Pain Descriptor: Tingling, Numbness       Objective           Transfers  Sit to Stand: Minimal Assistance (education for hand placement.)  Stand to Sit: Moderate Assistance (R LE buckling required increased assist for safety.)  Stand Pivot Transfers: Minimal Assistance (c walker bed> chair.)     Ambulation  Surface: Level tile  Device: Rolling Walker  Assistance: Moderate assistance  Quality of Gait: impulsive, buckling of R LE  Gait Deviations: Decreased step length, Decreased step height, Staggers  Distance: 4 ft forward, 4 ft toward HOB  Comments: Pt expressed frustration about inability to ambulate, tends to make her impulsive with lack of awareness of deficits.       Bed Mobility  Supine to Sit: Stand by assistance (impulsive, education to slow.)  Sit to Supine: Minimal assistance (assist for R LE onto bed.)  Scooting: Stand by assistance (impulsive.)       PT Exercises  Static Sitting

## 2024-12-30 NOTE — CARE COORDINATION
ONGOING DISCHARGE PLAN:    PM&R consult ordered    IVIG last day will be 1/1/25    Neurology following      Will continue to follow for additional discharge needs.    If patient is discharged prior to next notation, then this note serves as note for discharge by case management.    Electronically signed by Antonella Worthy RN on 12/30/2024 at 2:54 PM

## 2024-12-30 NOTE — PROGRESS NOTES
abdomen. Upper abdomen not well evaluated to exclude lymphadenopathy.  There is a nonspecific low-attenuation lesion seen within the left hepatic lobe, measuring 18 x 20 mm in size in segment 3, axial image 198 of series 2.  No other definite liver mass. Soft Tissues/Bones: There are numerous borderline and mildly enlarged lymph nodes seen involving the axilla and subpectoral lymph nodes bilaterally. Numerous subcentimeter supraclavicular lymph nodes are noted as well.  No acute osseous fracture is identified in the spine.  No paraspinal mass.  No significant focal canal stenosis is identified.     1. No pulmonary emboli. 2. No acute pulmonary process. 3. Mediastinal, hilar, and axillary lymphadenopathy. This is nonspecific, and could be seen with lymphoma or metastatic disease. 4. Nonspecific low-attenuation lesion in the left hepatic lobe measuring 18 x 20 mm in size. This could be further evaluated with MRI of the abdomen with and without contrast.  This may represent a mildly complex cyst or hemangioma, though a neoplastic process is in the differential.     XR CHEST PORTABLE    Result Date: 12/16/2024  EXAMINATION: ONE XRAY VIEW OF THE CHEST 12/16/2024 2:29 am COMPARISON: None. HISTORY: ORDERING SYSTEM PROVIDED HISTORY: Chest Pain TECHNOLOGIST PROVIDED HISTORY: Chest Pain Reason for Exam: chest pain back pain joint pain Additional signs and symptoms: chest pain back pain joint pain Relevant Medical/Surgical History: chest pain back pain joint pain FINDINGS: Cardiac and mediastinal silhouettes appear within normal limits for size. Pulmonary vascularity is normal.  No pneumothorax.  No consolidation.  No pleural effusion.  No pulmonary edema.  No acute osseous abnormality is identified.     No acute parenchymal infiltrate.         Physical Examination:        Physical Exam  Constitutional:       Appearance: She is ill-appearing.   HENT:      Head: Normocephalic and atraumatic.      Mouth/Throat:      Mouth:  outside of the window  Loaded with aspirin and Plavix in ER, discontinued after MRI.   Lyme serology negative  CSF culture negative, Glucose 58, protein 155, meningitis encephalitis panel negative oligoclonal bands IgG index 0.65, albumin index 207.8  Ganglioside antibodies pending  Passed bedside swallow and speech evaluation, diet resumed  CSF was positive for albuminocytologic dissociation, suggestive of GBS  Recent NIF is -60, VC is 2.3, monitoring respiratory status  Started IVIG, 3rd day today  Neurology following     Hypertension  On amlodipine at home  Blood pressure goal 140-200     Diet: Regular diet  DVT prophylaxis: Lovenox    Plan will be discussed with the attending    Junior Romano MD  PGY I Transitional Year Resident  12/30/2024 8:44 AM     Electronically signed by Junior Romano MD       Attending Physician Statement  I have discussed the care of Suzan Garcia and I have examined the patient myselft and taken ros and hpi , including pertinent history and exam findings,  with the resident. I have reviewed the key elements of all parts of the encounter with the resident.  I agree with the assessment, plan and orders as documented by the resident.      Electronically signed by Baljit Ross MD

## 2024-12-30 NOTE — PROGRESS NOTES
12/30/24 1416   Encounter Summary   Encounter Overview/Reason Volunteer Encounter   Rituals, Rites and Sacraments   Type [employer]

## 2024-12-31 LAB
ANION GAP SERPL CALCULATED.3IONS-SCNC: 10 MMOL/L (ref 9–16)
BASOPHILS # BLD: 0.06 K/UL (ref 0–0.2)
BASOPHILS NFR BLD: 1 % (ref 0–2)
BUN SERPL-MCNC: 18 MG/DL (ref 6–20)
CALCIUM SERPL-MCNC: 8.7 MG/DL (ref 8.6–10.4)
CHLORIDE SERPL-SCNC: 103 MMOL/L (ref 98–107)
CO2 SERPL-SCNC: 22 MMOL/L (ref 20–31)
CREAT SERPL-MCNC: 0.6 MG/DL (ref 0.7–1.2)
EKG ATRIAL RATE: 70 BPM
EKG P AXIS: 65 DEGREES
EKG P-R INTERVAL: 120 MS
EKG Q-T INTERVAL: 410 MS
EKG QRS DURATION: 82 MS
EKG QTC CALCULATION (BAZETT): 442 MS
EKG R AXIS: -40 DEGREES
EKG T AXIS: 28 DEGREES
EKG VENTRICULAR RATE: 70 BPM
EOSINOPHIL # BLD: 1.14 K/UL (ref 0–0.4)
EOSINOPHILS RELATIVE PERCENT: 19 % (ref 0–4)
ERYTHROCYTE [DISTWIDTH] IN BLOOD BY AUTOMATED COUNT: 14.7 % (ref 11.5–14.9)
GFR, ESTIMATED: >90 ML/MIN/1.73M2
GLUCOSE SERPL-MCNC: 115 MG/DL (ref 74–99)
HCT VFR BLD AUTO: 36.8 % (ref 36–46)
HGB BLD-MCNC: 12.3 G/DL (ref 12–16)
LYMPHOCYTES NFR BLD: 1.26 K/UL (ref 1–4.8)
LYMPHOCYTES RELATIVE PERCENT: 21 % (ref 24–44)
MCH RBC QN AUTO: 28 PG (ref 26–34)
MCHC RBC AUTO-ENTMCNC: 33.5 G/DL (ref 31–37)
MCV RBC AUTO: 83.7 FL (ref 80–100)
MICROORGANISM SPEC CULT: NORMAL
MICROORGANISM SPEC CULT: NORMAL
MONOCYTES NFR BLD: 0.48 K/UL (ref 0.1–1.3)
MONOCYTES NFR BLD: 8 % (ref 1–7)
MORPHOLOGY: NORMAL
NEUTROPHILS NFR BLD: 51 % (ref 36–66)
NEUTS SEG NFR BLD: 3.06 K/UL (ref 1.3–9.1)
PLATELET # BLD AUTO: 280 K/UL (ref 150–450)
PMV BLD AUTO: 7.3 FL (ref 6–12)
POTASSIUM SERPL-SCNC: 3.7 MMOL/L (ref 3.7–5.3)
RBC # BLD AUTO: 4.4 M/UL (ref 4–5.2)
SEND OUT REPORT: NORMAL
SERVICE CMNT-IMP: NORMAL
SERVICE CMNT-IMP: NORMAL
SODIUM SERPL-SCNC: 135 MMOL/L (ref 136–145)
SPECIMEN DESCRIPTION: NORMAL
SPECIMEN DESCRIPTION: NORMAL
TEST NAME: NORMAL
WBC OTHER # BLD: 6 K/UL (ref 3.5–11)

## 2024-12-31 PROCEDURE — 2500000003 HC RX 250 WO HCPCS

## 2024-12-31 PROCEDURE — 80048 BASIC METABOLIC PNL TOTAL CA: CPT

## 2024-12-31 PROCEDURE — 6360000002 HC RX W HCPCS: Performed by: PSYCHIATRY & NEUROLOGY

## 2024-12-31 PROCEDURE — 6370000000 HC RX 637 (ALT 250 FOR IP)

## 2024-12-31 PROCEDURE — 97530 THERAPEUTIC ACTIVITIES: CPT

## 2024-12-31 PROCEDURE — 99222 1ST HOSP IP/OBS MODERATE 55: CPT | Performed by: STUDENT IN AN ORGANIZED HEALTH CARE EDUCATION/TRAINING PROGRAM

## 2024-12-31 PROCEDURE — 93010 ELECTROCARDIOGRAM REPORT: CPT | Performed by: INTERNAL MEDICINE

## 2024-12-31 PROCEDURE — 99232 SBSQ HOSP IP/OBS MODERATE 35: CPT | Performed by: PSYCHIATRY & NEUROLOGY

## 2024-12-31 PROCEDURE — 6360000002 HC RX W HCPCS

## 2024-12-31 PROCEDURE — 85025 COMPLETE CBC W/AUTO DIFF WBC: CPT

## 2024-12-31 PROCEDURE — 36415 COLL VENOUS BLD VENIPUNCTURE: CPT

## 2024-12-31 PROCEDURE — 99233 SBSQ HOSP IP/OBS HIGH 50: CPT | Performed by: INTERNAL MEDICINE

## 2024-12-31 PROCEDURE — 97110 THERAPEUTIC EXERCISES: CPT

## 2024-12-31 PROCEDURE — 2060000000 HC ICU INTERMEDIATE R&B

## 2024-12-31 PROCEDURE — 6370000000 HC RX 637 (ALT 250 FOR IP): Performed by: PSYCHIATRY & NEUROLOGY

## 2024-12-31 RX ORDER — GABAPENTIN 300 MG/1
300 CAPSULE ORAL NIGHTLY
Status: DISCONTINUED | OUTPATIENT
Start: 2024-12-31 | End: 2025-01-03 | Stop reason: HOSPADM

## 2024-12-31 RX ADMIN — SODIUM CHLORIDE, PRESERVATIVE FREE 10 ML: 5 INJECTION INTRAVENOUS at 08:32

## 2024-12-31 RX ADMIN — CARBOXYMETHYLCELLULOSE SODIUM 1 DROP: 5 SOLUTION/ DROPS OPHTHALMIC at 18:24

## 2024-12-31 RX ADMIN — CARBOXYMETHYLCELLULOSE SODIUM 1 DROP: 5 SOLUTION/ DROPS OPHTHALMIC at 08:31

## 2024-12-31 RX ADMIN — CARBOXYMETHYLCELLULOSE SODIUM 1 DROP: 5 SOLUTION/ DROPS OPHTHALMIC at 20:52

## 2024-12-31 RX ADMIN — KETOROLAC TROMETHAMINE 15 MG: 30 INJECTION, SOLUTION INTRAMUSCULAR at 18:23

## 2024-12-31 RX ADMIN — KETOROLAC TROMETHAMINE 15 MG: 30 INJECTION, SOLUTION INTRAMUSCULAR at 08:21

## 2024-12-31 RX ADMIN — KETOROLAC TROMETHAMINE 15 MG: 30 INJECTION, SOLUTION INTRAMUSCULAR at 01:35

## 2024-12-31 RX ADMIN — ENOXAPARIN SODIUM 40 MG: 100 INJECTION SUBCUTANEOUS at 08:27

## 2024-12-31 RX ADMIN — ROSUVASTATIN 40 MG: 40 TABLET, FILM COATED ORAL at 19:47

## 2024-12-31 RX ADMIN — CARBOXYMETHYLCELLULOSE SODIUM 1 DROP: 5 SOLUTION/ DROPS OPHTHALMIC at 12:41

## 2024-12-31 RX ADMIN — SODIUM CHLORIDE, PRESERVATIVE FREE 10 ML: 5 INJECTION INTRAVENOUS at 19:48

## 2024-12-31 RX ADMIN — AMLODIPINE BESYLATE 2.5 MG: 2.5 TABLET ORAL at 08:25

## 2024-12-31 RX ADMIN — IMMUNE GLOBULIN (HUMAN) 20000 MG: 10 INJECTION INTRAVENOUS; SUBCUTANEOUS at 09:03

## 2024-12-31 RX ADMIN — GABAPENTIN 300 MG: 300 CAPSULE ORAL at 19:47

## 2024-12-31 ASSESSMENT — PAIN DESCRIPTION - DESCRIPTORS
DESCRIPTORS: ACHING;DISCOMFORT
DESCRIPTORS: ACHING;DISCOMFORT

## 2024-12-31 ASSESSMENT — PAIN DESCRIPTION - ORIENTATION
ORIENTATION: MID
ORIENTATION: MID;LOWER

## 2024-12-31 ASSESSMENT — PAIN SCALES - GENERAL
PAINLEVEL_OUTOF10: 6
PAINLEVEL_OUTOF10: 6

## 2024-12-31 ASSESSMENT — ENCOUNTER SYMPTOMS
ABDOMINAL PAIN: 0
BLURRED VISION: 1

## 2024-12-31 ASSESSMENT — PAIN DESCRIPTION - LOCATION
LOCATION: BACK
LOCATION: BACK

## 2024-12-31 NOTE — PROGRESS NOTES
VII - left complete facial droop, able to passively close eyelid                                                           VIII - intact hearing                                                                             IX, X - symmetrical palate elevation                                               XI - symmetrical shoulder shrug                                                       XII - midline tongue without atrophy or fasciculation     Motor function  Strength: 5/5 RUE, 2/5 RLE, 5/5 LUE, 4/5  LLE  Normal bulk and tone.   No tremors                      Sensory function Intact to touch throughout     Cerebellar Intact finger-nose-finger testing.      Reflex function 1/4 bilateral UE, trace bilateral LE. Downgoing plantar response bilaterally. (-)Marquez's sign bilaterally    Gait                  Deferred             Diagnostics:      Laboratory Testing:  CBC:   Recent Labs     12/29/24 0519 12/30/24 0629 12/31/24 0519   WBC 6.9 6.0 6.0   HGB 12.9 12.4 12.3    291 280     BMP:    Recent Labs     12/29/24 0519 12/30/24 0629 12/31/24 0519    137 135*   K 4.4 3.8 3.7    104 103   CO2 26 26 22   BUN 28* 20 18   CREATININE 0.7 0.7 0.6*   GLUCOSE 116* 125* 115*         Lab Results   Component Value Date    CHOL 148 12/26/2024    HDL 20 (L) 12/26/2024    TRIG 188 (H) 12/26/2024    ALT 61 (H) 12/26/2024    AST 33 12/26/2024    TSH 1.84 12/26/2024    INR 1.1 12/26/2024    LABA1C 5.7 12/26/2024    [id number] >2000 (H) 12/22/2024    MG 2.2 12/21/2024         Imaging/Diagnostics:      Results for orders placed during the hospital encounter of 12/26/24    MRI CERVICAL SPINE WO CONTRAST    Narrative  EXAMINATION:  MRI OF THE CERVICAL SPINE WITHOUT CONTRAST 12/26/2024 6:09 pm    TECHNIQUE:  Multiplanar multisequence MRI of the cervical spine was performed without the  administration of intravenous contrast.    COMPARISON:  None.    HISTORY:  ORDERING SYSTEM PROVIDED HISTORY: recent left side lymph  weight  based adjustment of the mA/kV was utilized to reduce the radiation dose to as  low as reasonably achievable.    COMPARISON:  None.    HISTORY:  ORDERING SYSTEM PROVIDED HISTORY: Stroke Symptoms  TECHNOLOGIST PROVIDED HISTORY:    Stroke Symptoms  Decision Support Exception - unselect if not a suspected or confirmed  emergency medical condition->Emergency Medical Condition (MA)  Reason for Exam: Stroke Symptoms,Facial Droop; Extremity Weakness  Additional signs and symptoms: right side facial droop and eye droop/twitch  per RN. LKW 7pm 12/25/24    FINDINGS:  BRAIN/VENTRICLES: There is no acute intracranial hemorrhage, mass effect or  midline shift.  No abnormal extra-axial fluid collection.  The gray-white  differentiation is maintained without evidence of an acute infarct.  There is  no evidence of hydrocephalus. There are areas of hypoattenuation in the  periventricular and subcortical white matter, which is nonspecific, but may  represent chronic microvasvular ischemic change.    ORBITS: The visualized portion of the orbits demonstrate no acute abnormality.    SINUSES: The visualized paranasal sinuses and mastoid air cells demonstrate  no acute abnormality.    SOFT TISSUES/SKULL:  No acute abnormality of the visualized skull or soft  tissues.    Impression  1. No acute intracranial abnormality.  2. Minimal chronic microvascular ischemic changes.          I personally reviewed all of the above medications, clinical laboratory, imaging and other diagnostic tests.         Impression:      Bilateral, R>L, leg weakness and left CN VII palsy. In a patient with CSF albuminocytologic dissociation, consistent with Guillain-Barré syndrome.  Somewhat atypical given asymmetric findings, although similar cases have previously been reported in literature. GM AB panel negative. Symptoms improving with IVIG.    Plan:     Continue IVIG, today day 4/5.  Start gabapentin 300 mg qhs for paresthesias.  Artificial drops ordered

## 2024-12-31 NOTE — PLAN OF CARE
Problem: Discharge Planning  Goal: Discharge to home or other facility with appropriate resources  12/31/2024 1723 by Melody Donald RN  Outcome: Progressing  Flowsheets (Taken 12/28/2024 0412 by Candida Escalera, RN)  Discharge to home or other facility with appropriate resources: Identify barriers to discharge with patient and caregiver  12/31/2024 0426 by Sidney Cowart RN  Outcome: Progressing     Problem: Safety - Adult  Goal: Free from fall injury  12/31/2024 1723 by Melody Donald RN  Outcome: Progressing  Flowsheets (Taken 12/31/2024 1723)  Free From Fall Injury: Instruct family/caregiver on patient safety  Note: Patient's pain has been well controlled throughout the entire shift, please see MAR.      12/31/2024 0426 by Sidney Cowart RN  Outcome: Progressing     Problem: Pain  Goal: Verbalizes/displays adequate comfort level or baseline comfort level  12/31/2024 1723 by Melody Donald RN  Outcome: Progressing  Flowsheets (Taken 12/30/2024 0322 by Bhavya Nuno RN)  Verbalizes/displays adequate comfort level or baseline comfort level:   Assess pain using appropriate pain scale   Administer analgesics based on type and severity of pain and evaluate response  Note: Patient's pain has been well controlled throughout the entire shift, please see MAR.    12/31/2024 0426 by Sidney Cowart RN  Outcome: Progressing     Problem: Chronic Conditions and Co-morbidities  Goal: Patient's chronic conditions and co-morbidity symptoms are monitored and maintained or improved  12/31/2024 1723 by Melody Donald RN  Outcome: Progressing  Flowsheets (Taken 12/31/2024 1723)  Care Plan - Patient's Chronic Conditions and Co-Morbidity Symptoms are Monitored and Maintained or Improved:   Monitor and assess patient's chronic conditions and comorbid symptoms for stability, deterioration, or improvement   Collaborate with multidisciplinary team to address chronic and comorbid conditions and prevent exacerbation or  parameters to optimize cerebral perfusion and minimize risk of hemorrhage   Monitor temperature, glucose, and sodium. Initiate appropriate interventions as ordered  12/31/2024 0426 by Sidney Cowart, RN  Outcome: Progressing

## 2024-12-31 NOTE — PROGRESS NOTES
Physical Therapy  Facility/Department: [facility] PROGRESSIVE CARE  Physical Therapy Progress Notes     Name: Suzan Garcia  : 1965  MRN: [id number]  Date of Service: 2024    Discharge Recommendations:  Continue to assess pending progress, 24 hour supervision or assist   PT Equipment Recommendations  Equipment Needed:  (TBD)      Patient Diagnosis(es): The primary encounter diagnosis was Facial droop. Diagnoses of Acute ischemic stroke (HCC), Leg weakness, bilateral, Chronic bilateral low back pain, unspecified whether sciatica present, and Stroke-like symptoms were also pertinent to this visit.  Past Medical History:  has a past medical history of Chronic back pain.  Past Surgical History:  has a past surgical history that includes  section and IR BIOPSY LYMPH NODE SUPERFICIAL (2024).    Assessment  Assessment: Impaired mobility due to pain and decreased tolerance to activity with safety concerns of decreased balance  Decision Making: Medium Complexity  History: Stroke-like Symptoms  Exam: decreased balance, endurance, mobility; increased pain  Clinical Presentation: evolving  Requires PT Follow-Up: Yes  Activity Tolerance  Activity Tolerance: Patient limited by endurance;Patient limited by fatigue    Plan  Physical Therapy Plan  General Plan: 1 time a day 7 days a week  Current Treatment Recommendations: Gait training, Stair training, Functional mobility training, Balance training, Transfer training, Therapeutic activities, Safety education & training, Endurance training  Safety Devices  Type of Devices: Call light within reach, Left in chair, All fall risk precautions in place, Gait belt (Chair alarm retrieved, PCT present at end of tx.)    Restrictions  Restrictions/Precautions  Restrictions/Precautions: Fall Risk, General Precautions  Activity Level: Up as Tolerated, Up with Assist  Required Braces or Orthoses?: No     Subjective  General  Patient assessed for rehabilitation services?:

## 2024-12-31 NOTE — CARE COORDINATION
ACUTE INPATIENT REHABILITATION  Financial Disclosure Statement: Eligibility and Benefit Verification    Patient Name: Suzan Garcia MRN: [id number]     Disclosure Statement  [facility] Acute Inpatient Rehabilitation at [facility] provides 24 hour individualized service to patients with functional limitations due to but not limited to stroke, brain injury, spinal cord injury, major multiple trauma, hip fracture, amputation, and neurological disorders.  Acute Inpatient Rehabilitation provides rehabilitative nursing, physician coverage, as well as physical therapy, occupational therapy, speech therapy, recreational therapy and other services as deemed necessary by our Board Certified Physical Medicine and Rehabilitation Physicians Dr. Nick Dockery, Dr. Rita Marquez, Dr. Ann Carson and Dr. Jose M Montoya.    [facility] Acute Inpatient Rehabilitation at [facility] is fully accredited by the Commission on Accreditation of Rehabilitation Facilities (CARF) and The Joint Commission (TJC).    At a minimum, you will receive 5 days of therapy services totaling at least 15 hours per week. Your treatment program will consist of physical therapy at least 7.5 hours per week; occupational therapy 7.5 hours per week; and speech therapy 1.5 hours per week, as applicable.    Your estimated length of stay is currently:  Approximately 2 Weeks  Please Note: Estimated length of stay is based on individual condition and Acute Inpatient Rehabilitation specific needs. Length of stay may vary based upon interdisciplinary team assessment, insurance approval, and patient progression.    Your insurance coverage has been verified as follows:   Date Verified: 12/31/2024   Method:  Online Portal    Primary Insurance: [employer]   Member/Subscriber ID:  [id number]  Coverage: Per Benefit Period  Plan Effective Date: 01/01/2024 Status: current   Deductible: $800.00 (Amount Currently Met: $0.00)  Co-Pay:

## 2024-12-31 NOTE — PROGRESS NOTES
[facility]  IN-PATIENT SERVICE  [facility]    PROGRESS NOTE             12/31/2024    7:42 AM    Name:   Suzan Garcia  MRN:     [id number]     Acct:      [id number]   Room:   2096/2096-01  IP Day:  5  Admit Date:  12/26/2024 11:23 AM    PCP:  No primary care provider on file.  Code Status:  Full Code    Subjective:     C/C:   Chief Complaint   Patient presents with    Facial Droop    Extremity Weakness     Interval History Status: not changed.    Patient was seen and examined at bedside.  Vitally stable.  No acute event overnight.     Patient states that her weakness is improving.  She reports improvement of left lower extremity, no change in right lower extremity exam.     NIF and vital capacity is being monitored for possible respiratory depression.  Recent NIF is -50, VC is 3.1.    CSF studies suggestive of GBS, IVIG for 5 days.  Currently clinically side antibodies negative.  Fourth day of IVIG    Brief History:     The patient is a 59 y.o.   /  female who presents withFacial Droop and Extremity Weakness   and she is admitted to the hospital for the management of  Stroke like symptoms.      She has a past medical history of back pain and hypertension.  She came in today with a complaint of left facial droop and bilateral lower extremity  weakness.  She started having the symptoms since 7 PM on 12/25/2024.  Symptoms progressively got worsened.  She does not complain of any headache, visual changes, chest pain, shortness of breath, abdominal and bowel control.  She did report difficulty in walking.     CT head and CTA head and neck showed no acute abnormality.  MRI brain, and pan spine ordered.     She was recently discharged from [facility].  During that time, she presented with back pain radiating to frontal abdominal wall.  ALP was elevated.  Left cervical lymph node biopsy was done by IR.  HIDA scan revealed no acute cholecystitis.     On  facial droop present  Eyes:      Conjunctiva/sclera: Conjunctivae normal.   Cardiovascular:      Rate and Rhythm: Normal rate and regular rhythm.   Pulmonary:      Effort: Pulmonary effort is normal.      Breath sounds: Normal breath sounds.   Abdominal:      General: Bowel sounds are normal. There is no distension.      Tenderness: There is no abdominal tenderness.   Neurologic Examination   Left facial droop present, improving   Pupils equal and reactive, normal extraocular movements   Upper extremities: Power 5/5   Left lower extremity: Power 2/5, loss of vibratory, touch and pain sensations below knee   Right lower extremity: Power 4/5,loss of vibratory, touch and pain sensations below knee   Reflexes: Decreased in both upper and lower extremities, downgoing    Assessment:        Primary Problem  Stroke-like symptoms    Active Hospital Problems    Diagnosis Date Noted    Guillain Barré syndrome (HCC) [G61.0] 12/28/2024    Leg weakness, bilateral [R29.898] 12/27/2024    Bell's palsy [G51.0] 12/27/2024       Plan:        Patient status Admit as inpatient in the  Progressive Unit/Step down     Acute bilateral lower extremity weakness, left facial droop secondary to GBS  Presented with bilateral lower extremity weakness left more than right associated with loss of sensation in bilateral lower and extremities  CT head and CTA head and neck showed no acute intracranial abnormalities  MRI brain showed no acute intracranial abnormalities, except for some microvascular changes.MRI cervical spine showed mild spinal canal stenosis at C6-C7 and T1-T2.  MRI thoracic spine showed mild spinal stenosis at T8-T9 secondary to posterior disc protrusion.  MRI lumbar spine showed central disc protrusion at L4-L5, moderately narrows the spinal canal.  Posterior disc bulge at L3-L4 and mild spinal canal stenosis.  Mild bilateral neural foraminal narrowing at L3-L4 and L4-L5.  Not a TNK candidate, outside of the window  Loaded with

## 2024-12-31 NOTE — CARE COORDINATION
Acute Inpatient Rehabilitation referral received via Fax    \"Inpatient Consult to PM&R - Physiatry [[id number]]\" Consult Order Status: PM&R consult completed on 12/31/2024, current recommendation is IPR     PM&R physiatrist Dr. Ann Carson on service for PM&R consult.    Updated Kristin CM: Via [employer] at this time.     Per PM&R physiatrist Dr. Ann Carson, patient appropriate for Acute Inpatient Rehabilitation level of care.    Eligibility & Benefits Verified - See Note    Prior authorization request for admission initiated with primary insurance [facility] via:  Fax [phone]    Pending Case Reference/Authorization # [id number]    Clinical documentation submitted via Fax to [phone]    Updated Kristin CM:  [employer]  at this time.

## 2024-12-31 NOTE — CARE COORDINATION
ONGOING DISCHARGE PLAN:    Patient is alert and oriented x4.    Spoke with patient and family at bedside (son, interpreted our conversation at patient's request) regarding discharge plan and updated that the patient is being considered for inpatient rehab and there is a pending PM&R consultation.    After much discussion and FOC list provided for rehab facilities, patient and son would like referral to [facility] inpatient rehab (sent).     Neurology on board-will receive dose 4 of 5 IVIG today. Lumbar puncture consistent with Guillian-[place] syndrome.    PT/OT. Awaiting PM&R recommendations.     Will continue to follow for additional discharge needs.    If patient is discharged prior to next notation, then this note serves as note for discharge by case management.    Electronically signed by Kristin Navarro RN on 12/31/2024 at 11:25 AM

## 2024-12-31 NOTE — PLAN OF CARE
Problem: Discharge Planning  Goal: Discharge to home or other facility with appropriate resources  12/31/2024 0426 by Sidney Cowart RN  Outcome: Progressing     Problem: Safety - Adult  Goal: Free from fall injury  12/31/2024 0426 by Sidney Cowart RN  Outcome: Progressing     Problem: Pain  Goal: Verbalizes/displays adequate comfort level or baseline comfort level  12/31/2024 0426 by Sidney Cowart RN  Outcome: Progressing     Problem: Chronic Conditions and Co-morbidities  Goal: Patient's chronic conditions and co-morbidity symptoms are monitored and maintained or improved  Outcome: Progressing     Problem: Skin/Tissue Integrity  Goal: Absence of new skin breakdown  Description: 1.  Monitor for areas of redness and/or skin breakdown  2.  Assess vascular access sites hourly  3.  Every 4-6 hours minimum:  Change oxygen saturation probe site  4.  Every 4-6 hours:  If on nasal continuous positive airway pressure, respiratory therapy assess nares and determine need for appliance change or resting period.  Outcome: Progressing     Problem: ABCDS Injury Assessment  Goal: Absence of physical injury  Outcome: Progressing     Problem: Neurosensory - Adult  Goal: Achieves stable or improved neurological status  12/31/2024 0426 by Sidney Cowart RN  Outcome: Progressing

## 2024-12-31 NOTE — CONSULTS
Physical Medicine & Rehabilitation  Consult Note      Admitting Physician:  Hong Woo MD    Primary Care Provider:  No primary care provider on file.     Reason for Consult:  Acute Inpatient Rehabilitation    Chief Complaint:  Left facial droop, bilateral lower limb weakness    History of Present Illness:  Referring Provider is requesting an evaluation for appropriate placement upon discharge from acute care. History from chart review and patient.    Suzan Garcia is a 59 y.o. right-handed female with history of HTN admitted to [facility] on 12/26/2024.      She initially presented with left facial droop and bilateral lower limb weakness for 1 day.  She had been recently admitted to [place] with cholelithiasis and thoracic and abdominal lymphadenopathy.  Upon the current presentation, MRI brain showed no acute intracranial abnormality.  MRI of the spine showed no acute abnormalities.  Lumbar puncture was done on 12/27/24.  She is currently receiving a 5-day course of IVIG for Guillain-[place] syndrome (last dose 1/1/25).    She reports ongoing bilateral lower limb weakness, right greater than left.  She also notes numbness/tingling in the bilateral lower limbs.  She states that she continues to have some weakness in the left side of her face along with speech difficulty.  She reports blurred vision as well.    Of note, video  services were used throughout evaluation ([place] #[id number]).    Review of Systems:  Review of Systems   Constitutional:  Negative for fever.   Eyes:  Positive for blurred vision.   Gastrointestinal:  Negative for abdominal pain.   Neurological:  Positive for sensory change, speech change and weakness.      Premorbid function:  Independent    Current function:    Physical Therapy    Restrictions/Precautions: Fall Risk, General Precautions    Bed mobility  Supine to Sit: Minimal assistance (for Trunk progression)  Sit to Supine: Minimal assistance (assist for R LE onto  microvascular ischemia.  No mass effect or midline shift. No evidence of an acute intracranial hemorrhage.  The ventricles and sulci are normal in size and configuration.  The sellar/suprasellar regions appear unremarkable.  The normal signal voids within the major intracranial vessels appear maintained. ORBITS: The visualized portion of the orbits demonstrate no acute abnormality. SINUSES: The visualized paranasal sinuses and mastoid air cells demonstrate no acute abnormality. BONES/SOFT TISSUES: The bone marrow signal intensity appears normal. The soft tissues demonstrate no acute abnormality.     1.  No acute intracranial abnormality. 2. Mild chronic white matter microvascular ischemic changes.     CTA HEAD NECK W CONTRAST    Result Date: 12/26/2024  EXAMINATION: CTA OF THE HEAD AND NECK WITH CONTRAST 12/26/2024 11:36 am: TECHNIQUE: CTA of the head and neck was performed with the administration of intravenous contrast. Multiplanar reformatted images are provided for review.  MIP images are provided for review. Stenosis of the internal carotid arteries measured using NASCET criteria. Automated exposure control, iterative reconstruction, and/or weight based adjustment of the mA/kV was utilized to reduce the radiation dose to as low as reasonably achievable. 3D reconstructed images were performed on a separate workstation and provided for review.  This scan was analyzed using [employer] contact LVO. Identification of suspected findings is not for diagnostic use beyond notification. Viz LVO is limited to analysis of imaging data and should not be used in-lieu of full patient evaluation or relied upon to make or confirm diagnosis. COMPARISON: None. HISTORY: ORDERING SYSTEM PROVIDED HISTORY: Stroke Symptoms TECHNOLOGIST PROVIDED HISTORY: Stroke Symptoms Decision Support Exception - unselect if not a suspected or confirmed emergency medical condition->Emergency Medical Condition (MA) Reason for Exam: stroke symptoms

## 2024-12-31 NOTE — PROGRESS NOTES
12/31/24 1850   Rituals, Rites and Sacraments   Type [facility]

## 2025-01-01 LAB
ANION GAP SERPL CALCULATED.3IONS-SCNC: 4 MMOL/L (ref 9–16)
BASOPHILS # BLD: 0.25 K/UL (ref 0–0.2)
BASOPHILS NFR BLD: 4 % (ref 0–2)
BUN SERPL-MCNC: 26 MG/DL (ref 6–20)
CALCIUM SERPL-MCNC: 9.1 MG/DL (ref 8.6–10.4)
CHLORIDE SERPL-SCNC: 105 MMOL/L (ref 98–107)
CO2 SERPL-SCNC: 27 MMOL/L (ref 20–31)
CREAT SERPL-MCNC: 0.7 MG/DL (ref 0.7–1.2)
EOSINOPHIL # BLD: 1.13 K/UL (ref 0–0.4)
EOSINOPHILS RELATIVE PERCENT: 18 % (ref 0–4)
ERYTHROCYTE [DISTWIDTH] IN BLOOD BY AUTOMATED COUNT: 14.6 % (ref 11.5–14.9)
GFR, ESTIMATED: >90 ML/MIN/1.73M2
GLUCOSE SERPL-MCNC: 124 MG/DL (ref 74–99)
HCT VFR BLD AUTO: 37.4 % (ref 36–46)
HGB BLD-MCNC: 12.4 G/DL (ref 12–16)
LYMPHOCYTES NFR BLD: 1.45 K/UL (ref 1–4.8)
LYMPHOCYTES RELATIVE PERCENT: 23 % (ref 24–44)
MCH RBC QN AUTO: 27.3 PG (ref 26–34)
MCHC RBC AUTO-ENTMCNC: 33.1 G/DL (ref 31–37)
MCV RBC AUTO: 82.3 FL (ref 80–100)
MONOCYTES NFR BLD: 0.5 K/UL (ref 0.1–1.3)
MONOCYTES NFR BLD: 8 % (ref 1–7)
MORPHOLOGY: NORMAL
NEUTROPHILS NFR BLD: 47 % (ref 36–66)
NEUTS SEG NFR BLD: 2.97 K/UL (ref 1.3–9.1)
PLATELET # BLD AUTO: 297 K/UL (ref 150–450)
PMV BLD AUTO: 7 FL (ref 6–12)
POTASSIUM SERPL-SCNC: 4.1 MMOL/L (ref 3.7–5.3)
RBC # BLD AUTO: 4.55 M/UL (ref 4–5.2)
SODIUM SERPL-SCNC: 136 MMOL/L (ref 136–145)
WBC OTHER # BLD: 6.3 K/UL (ref 3.5–11)

## 2025-01-01 PROCEDURE — 94664 DEMO&/EVAL PT USE INHALER: CPT

## 2025-01-01 PROCEDURE — 94799 UNLISTED PULMONARY SVC/PX: CPT

## 2025-01-01 PROCEDURE — 6360000002 HC RX W HCPCS: Performed by: PSYCHIATRY & NEUROLOGY

## 2025-01-01 PROCEDURE — 6370000000 HC RX 637 (ALT 250 FOR IP)

## 2025-01-01 PROCEDURE — 85025 COMPLETE CBC W/AUTO DIFF WBC: CPT

## 2025-01-01 PROCEDURE — 99232 SBSQ HOSP IP/OBS MODERATE 35: CPT

## 2025-01-01 PROCEDURE — 94761 N-INVAS EAR/PLS OXIMETRY MLT: CPT

## 2025-01-01 PROCEDURE — 6370000000 HC RX 637 (ALT 250 FOR IP): Performed by: PSYCHIATRY & NEUROLOGY

## 2025-01-01 PROCEDURE — 99232 SBSQ HOSP IP/OBS MODERATE 35: CPT | Performed by: PSYCHIATRY & NEUROLOGY

## 2025-01-01 PROCEDURE — 6360000002 HC RX W HCPCS

## 2025-01-01 PROCEDURE — 36415 COLL VENOUS BLD VENIPUNCTURE: CPT

## 2025-01-01 PROCEDURE — 80048 BASIC METABOLIC PNL TOTAL CA: CPT

## 2025-01-01 PROCEDURE — 2060000000 HC ICU INTERMEDIATE R&B

## 2025-01-01 PROCEDURE — 2500000003 HC RX 250 WO HCPCS

## 2025-01-01 RX ADMIN — CARBOXYMETHYLCELLULOSE SODIUM 1 DROP: 5 SOLUTION/ DROPS OPHTHALMIC at 21:19

## 2025-01-01 RX ADMIN — ROSUVASTATIN 40 MG: 40 TABLET, FILM COATED ORAL at 21:15

## 2025-01-01 RX ADMIN — CARBOXYMETHYLCELLULOSE SODIUM 1 DROP: 5 SOLUTION/ DROPS OPHTHALMIC at 11:48

## 2025-01-01 RX ADMIN — ACETAMINOPHEN 650 MG: 325 TABLET ORAL at 11:49

## 2025-01-01 RX ADMIN — AMLODIPINE BESYLATE 2.5 MG: 2.5 TABLET ORAL at 11:49

## 2025-01-01 RX ADMIN — SODIUM CHLORIDE, PRESERVATIVE FREE 10 ML: 5 INJECTION INTRAVENOUS at 12:04

## 2025-01-01 RX ADMIN — SODIUM CHLORIDE, PRESERVATIVE FREE 10 ML: 5 INJECTION INTRAVENOUS at 21:14

## 2025-01-01 RX ADMIN — KETOROLAC TROMETHAMINE 15 MG: 30 INJECTION, SOLUTION INTRAMUSCULAR at 01:55

## 2025-01-01 RX ADMIN — IMMUNE GLOBULIN (HUMAN) 20000 MG: 10 INJECTION INTRAVENOUS; SUBCUTANEOUS at 12:00

## 2025-01-01 RX ADMIN — GABAPENTIN 300 MG: 300 CAPSULE ORAL at 21:15

## 2025-01-01 RX ADMIN — KETOROLAC TROMETHAMINE 15 MG: 30 INJECTION, SOLUTION INTRAMUSCULAR at 21:14

## 2025-01-01 RX ADMIN — KETOROLAC TROMETHAMINE 15 MG: 30 INJECTION, SOLUTION INTRAMUSCULAR at 11:50

## 2025-01-01 RX ADMIN — ENOXAPARIN SODIUM 40 MG: 100 INJECTION SUBCUTANEOUS at 11:48

## 2025-01-01 ASSESSMENT — ENCOUNTER SYMPTOMS
SHORTNESS OF BREATH: 0
COUGH: 0
BACK PAIN: 0
ALLERGIC/IMMUNOLOGIC NEGATIVE: 1
WHEEZING: 0
ABDOMINAL PAIN: 0
CONSTIPATION: 0
DIARRHEA: 0
NAUSEA: 0
VOMITING: 0

## 2025-01-01 ASSESSMENT — PAIN DESCRIPTION - ORIENTATION: ORIENTATION: MID

## 2025-01-01 ASSESSMENT — PAIN DESCRIPTION - LOCATION: LOCATION: HEAD

## 2025-01-01 ASSESSMENT — PAIN SCALES - GENERAL: PAINLEVEL_OUTOF10: 8

## 2025-01-01 ASSESSMENT — PAIN DESCRIPTION - DESCRIPTORS: DESCRIPTORS: ACHING

## 2025-01-01 NOTE — PROGRESS NOTES
01/01/25 1044   Spontaneous Parameters   NIF -50 cmH2O   VC 2.8   $NIF Charge Row $Yes     Pt had good effort.

## 2025-01-01 NOTE — PLAN OF CARE
Problem: Discharge Planning  Goal: Discharge to home or other facility with appropriate resources  1/1/2025 0342 by Sidney Cowart RN  Outcome: Progressing     Problem: Safety - Adult  Goal: Free from fall injury  1/1/2025 0342 by Sidney Cowart RN  Outcome: Progressing     Problem: Pain  Goal: Verbalizes/displays adequate comfort level or baseline comfort level  1/1/2025 0342 by Sidney Cowart RN  Outcome: Progressing     Problem: Chronic Conditions and Co-morbidities  Goal: Patient's chronic conditions and co-morbidity symptoms are monitored and maintained or improved  1/1/2025 0342 by Sidney Cowart RN  Outcome: Progressing     Problem: Skin/Tissue Integrity  Goal: Absence of new skin breakdown  Description: 1.  Monitor for areas of redness and/or skin breakdown  2.  Assess vascular access sites hourly  3.  Every 4-6 hours minimum:  Change oxygen saturation probe site  4.  Every 4-6 hours:  If on nasal continuous positive airway pressure, respiratory therapy assess nares and determine need for appliance change or resting period.  1/1/2025 0342 by Sidney Cowart RN  Outcome: Progressing     Problem: ABCDS Injury Assessment  Goal: Absence of physical injury  1/1/2025 0342 by Sidney Cowart RN  Outcome: Progressing     Problem: Neurosensory - Adult  Goal: Achieves stable or improved neurological status  1/1/2025 0342 by Sidney Cowart RN  Outcome: Progressing

## 2025-01-01 NOTE — PROGRESS NOTES
[facility] Neurology   IN-PATIENT SERVICE      NEUROLOGY PROGRESS  NOTE            Date:   1/1/2025  Patient name:  Suzan Garcia  Date of admission:  12/26/2024  YOB: 1965      Interval History:     1/1: Remains neurologically stable.  Facial droop appears improved.  States that weakness is improving as well.  Denies dyspnea.  Does state that has had intermittent mild dysphagia since admission.  Has been tolerating soft foods.    12/31: Remains neurologically stable.  Feels weakness is continuing to improve.  Has been complaining of some intermittent paresthesias in the lower extremities.  Per nursing staff, she is now 1 person assist whereas previously required 2 people.    12/30: No acute events.  Spoke with patient using interpreting services.  She states that she continues to feel weakness is improving with IVIG.  No new neurological symptoms.    History of Present Illness:     Per my partner:    The patient is a 59 y.o. female who presents with Facial Droop and Extremity Weakness  . The patient was seen and examined and the chart was reviewed.  Patient was recently discharged from the hospital after coming in for abdominal pain, findings of generalized lymphadenopathy, status post excisional cervical node biopsy and discharged on 12/24.  Since that time she has noticed that there is numbness in her face, weakness on the left side of her face, in addition endorses numbness and tingling in her fingertips as well as lower extremities.  She has felt like both lower extremities are weaker than normal.  Going back to the chart she also was seen in the ED on 12/16 and at that time had complaints of back pain and numbness and tingling.  It appears that the symptoms have been progressing since that time.  Patient is only Georgian-speaking and much of the history is obtained from her son through translation at bedside.     At this time she is awake alert and oriented.  There is obvious weakness of the

## 2025-01-01 NOTE — PROGRESS NOTES
SPEECH LANGUAGE PATHOLOGY  Speech Language Pathology  [facility] PROGRESSIVE CARE    Dysphagia Treatment Note    Date: 1/1/2025  Patient’s Name: Suzan Garcia  MRN: [id number]  Diagnosis: Guillain Barré syndrome   Patient Active Problem List   Diagnosis Code    ASCUS with positive high risk HPV cervical R87.610, R87.810    Screening mammogram for breast cancer Z12.31    Sleep disturbance G47.9    Cholelithiasis without cholangitis K80.20    Biliary colic K80.50    Cervical lymphadenopathy R59.0    Lymphadenopathy, generalized R59.1    Thrombocytopenia (HCC) D69.6    Leg weakness, bilateral R29.898    Bell's palsy G51.0    Guillain Barré syndrome (HCC) G61.0       Pain Rating: no c/o pain  Pain Location: N/A  Non-Pharmaceutical Pain Intervention: N/A    Dysphagia Treatment  Treatment time: 7820-0747    Subjective: [x] Alert [x] Cooperative     [] Confused     [] Agitated    [] Lethargic    Objective/Assessment:    ST received order for repeat bedside swallow evaluation due to Pt with new c/o intermittent difficulty swallowing. Pt states she feels like food gets stuck and has difficulty chewing due to facial weakness on (L) side. ST administered trial of regular solids. Pt demo prolonged mastication and latent throat clear. More timely oral phase observed for trials of soft and bite sized solids with Pt reporting better tolerance. No overt s/s aspiration observed for thin via straw. ST recommends diet change to Dysphagia soft and bite/sized (Dysphagia III) (IDDSI Level 6) solids and remain on Thin liquids (IDDSI Level 0). Also recommend Pt complete video swallow study due to c/o globus sensation. LOUIS Rice notified.       Plan:  [x] Continue ST services    [] Discharge from ST:        Discharge recommendations:  [x] Further therapy recommended at discharge.   [] No therapy recommended at discharge.         Treatment completed by: Suresh Mike M.S., CCC-SLP

## 2025-01-01 NOTE — PROGRESS NOTES
[facility]  IN-PATIENT SERVICE  [facility]    PROGRESS NOTE             1/1/2025    8:27 AM    Name:   Suzan Garcia  MRN:     [id number]     Acct:      [id number]   Room:   2096/2096-01   Day:  6  Admit Date:  12/26/2024 11:23 AM    PCP:  No primary care provider on file.  Code Status:  Full Code    Subjective:     C/C:   Chief Complaint   Patient presents with    Facial Droop    Extremity Weakness     Interval History Status: not changed.    Patient was seen and examined at bedside.  Vitally stable.  No acute event overnight.     She reported that her weakness continues to improve.  Today, she was able to move her right leg and able to keep her left leg out for 4 seconds.  No complaints of dry eyes anymore, using eye lubricants.    As per respiratory note, vital capacity is 2.8 L, NIF -30, but patient is not complaining of any shortness of breath even with mobility.    Today, fifth day of IVIG.    She will need ARU placement.  As per PMNR recommendations, patient will benefit from acute inpatient rehabilitation, and able to tell tolerate 3 hours of therapy per day in rehabilitation.    Brief History:     The patient is a 59 y.o.   /  female who presents withFacial Droop and Extremity Weakness   and she is admitted to the hospital for the management of  Stroke like symptoms.      She has a past medical history of back pain and hypertension.  She came in today with a complaint of left facial droop and bilateral lower extremity  weakness.  She started having the symptoms since 7 PM on 12/25/2024.  Symptoms progressively got worsened.  She does not complain of any headache, visual changes, chest pain, shortness of breath, abdominal and bowel control.  She did report difficulty in walking.     CT head and CTA head and neck showed no acute abnormality.  MRI brain, and pan spine ordered.     She was recently discharged from [facility].  During that  pulmonary process. Upper Abdomen: No acute inflammatory process seen in the upper abdomen. Upper abdomen not well evaluated to exclude lymphadenopathy.  There is a nonspecific low-attenuation lesion seen within the left hepatic lobe, measuring 18 x 20 mm in size in segment 3, axial image 198 of series 2.  No other definite liver mass. Soft Tissues/Bones: There are numerous borderline and mildly enlarged lymph nodes seen involving the axilla and subpectoral lymph nodes bilaterally. Numerous subcentimeter supraclavicular lymph nodes are noted as well.  No acute osseous fracture is identified in the spine.  No paraspinal mass.  No significant focal canal stenosis is identified.     1. No pulmonary emboli. 2. No acute pulmonary process. 3. Mediastinal, hilar, and axillary lymphadenopathy. This is nonspecific, and could be seen with lymphoma or metastatic disease. 4. Nonspecific low-attenuation lesion in the left hepatic lobe measuring 18 x 20 mm in size. This could be further evaluated with MRI of the abdomen with and without contrast.  This may represent a mildly complex cyst or hemangioma, though a neoplastic process is in the differential.     XR CHEST PORTABLE    Result Date: 12/16/2024  EXAMINATION: ONE XRAY VIEW OF THE CHEST 12/16/2024 2:29 am COMPARISON: None. HISTORY: ORDERING SYSTEM PROVIDED HISTORY: Chest Pain TECHNOLOGIST PROVIDED HISTORY: Chest Pain Reason for Exam: chest pain back pain joint pain Additional signs and symptoms: chest pain back pain joint pain Relevant Medical/Surgical History: chest pain back pain joint pain FINDINGS: Cardiac and mediastinal silhouettes appear within normal limits for size. Pulmonary vascularity is normal.  No pneumothorax.  No consolidation.  No pleural effusion.  No pulmonary edema.  No acute osseous abnormality is identified.     No acute parenchymal infiltrate.         Physical Examination:        Physical Exam  Constitutional:       Appearance: she is alert  HENT:

## 2025-01-01 NOTE — PLAN OF CARE
Problem: Discharge Planning  Goal: Discharge to home or other facility with appropriate resources  Outcome: Progressing  Flowsheets (Taken 1/1/2025 1149)  Discharge to home or other facility with appropriate resources:   Identify barriers to discharge with patient and caregiver   Arrange for needed discharge resources and transportation as appropriate   Identify discharge learning needs (meds, wound care, etc)   Refer to discharge planning if patient needs post-hospital services based on physician order or complex needs related to functional status, cognitive ability or social support system     Problem: Safety - Adult  Goal: Free from fall injury  Outcome: Progressing     Problem: Pain  Goal: Verbalizes/displays adequate comfort level or baseline comfort level  Outcome: Progressing     Problem: Chronic Conditions and Co-morbidities  Goal: Patient's chronic conditions and co-morbidity symptoms are monitored and maintained or improved  Outcome: Progressing  Flowsheets (Taken 1/1/2025 1149)  Care Plan - Patient's Chronic Conditions and Co-Morbidity Symptoms are Monitored and Maintained or Improved:   Monitor and assess patient's chronic conditions and comorbid symptoms for stability, deterioration, or improvement   Collaborate with multidisciplinary team to address chronic and comorbid conditions and prevent exacerbation or deterioration   Update acute care plan with appropriate goals if chronic or comorbid symptoms are exacerbated and prevent overall improvement and discharge     Problem: Skin/Tissue Integrity  Goal: Absence of new skin breakdown  Description: 1.  Monitor for areas of redness and/or skin breakdown  2.  Assess vascular access sites hourly  3.  Every 4-6 hours minimum:  Change oxygen saturation probe site  4.  Every 4-6 hours:  If on nasal continuous positive airway pressure, respiratory therapy assess nares and determine need for appliance change or resting period.  Outcome: Progressing     Problem:

## 2025-01-02 ENCOUNTER — APPOINTMENT (OUTPATIENT)
Dept: GENERAL RADIOLOGY | Age: 60
End: 2025-01-02
Payer: COMMERCIAL

## 2025-01-02 LAB
ANION GAP SERPL CALCULATED.3IONS-SCNC: 5 MMOL/L (ref 9–16)
BASOPHILS # BLD: 0.35 K/UL (ref 0–0.2)
BASOPHILS NFR BLD: 5 % (ref 0–2)
BUN SERPL-MCNC: 22 MG/DL (ref 6–20)
CALCIUM SERPL-MCNC: 8.9 MG/DL (ref 8.6–10.4)
CHLORIDE SERPL-SCNC: 105 MMOL/L (ref 98–107)
CO2 SERPL-SCNC: 25 MMOL/L (ref 20–31)
CREAT SERPL-MCNC: 0.6 MG/DL (ref 0.7–1.2)
EOSINOPHIL # BLD: 1.24 K/UL (ref 0–0.4)
EOSINOPHILS RELATIVE PERCENT: 18 % (ref 0–4)
ERYTHROCYTE [DISTWIDTH] IN BLOOD BY AUTOMATED COUNT: 14.5 % (ref 11.5–14.9)
GFR, ESTIMATED: >90 ML/MIN/1.73M2
GLUCOSE SERPL-MCNC: 108 MG/DL (ref 74–99)
HCT VFR BLD AUTO: 36.4 % (ref 36–46)
HGB BLD-MCNC: 12.1 G/DL (ref 12–16)
LYMPHOCYTES NFR BLD: 1.52 K/UL (ref 1–4.8)
LYMPHOCYTES RELATIVE PERCENT: 22 % (ref 24–44)
MCH RBC QN AUTO: 27.5 PG (ref 26–34)
MCHC RBC AUTO-ENTMCNC: 33.4 G/DL (ref 31–37)
MCV RBC AUTO: 82.5 FL (ref 80–100)
MONOCYTES NFR BLD: 0.41 K/UL (ref 0.1–1.3)
MONOCYTES NFR BLD: 6 % (ref 1–7)
MORPHOLOGY: NORMAL
NEUTROPHILS NFR BLD: 49 % (ref 36–66)
NEUTS SEG NFR BLD: 3.38 K/UL (ref 1.3–9.1)
PLATELET # BLD AUTO: 299 K/UL (ref 150–450)
PMV BLD AUTO: 7.2 FL (ref 6–12)
POTASSIUM SERPL-SCNC: 4 MMOL/L (ref 3.7–5.3)
RBC # BLD AUTO: 4.41 M/UL (ref 4–5.2)
SODIUM SERPL-SCNC: 135 MMOL/L (ref 136–145)
WBC OTHER # BLD: 6.9 K/UL (ref 3.5–11)

## 2025-01-02 PROCEDURE — 1200000000 HC SEMI PRIVATE

## 2025-01-02 PROCEDURE — 99232 SBSQ HOSP IP/OBS MODERATE 35: CPT | Performed by: INTERNAL MEDICINE

## 2025-01-02 PROCEDURE — 36415 COLL VENOUS BLD VENIPUNCTURE: CPT

## 2025-01-02 PROCEDURE — 97116 GAIT TRAINING THERAPY: CPT

## 2025-01-02 PROCEDURE — 6370000000 HC RX 637 (ALT 250 FOR IP)

## 2025-01-02 PROCEDURE — 6360000002 HC RX W HCPCS

## 2025-01-02 PROCEDURE — 99232 SBSQ HOSP IP/OBS MODERATE 35: CPT | Performed by: PSYCHIATRY & NEUROLOGY

## 2025-01-02 PROCEDURE — 92611 MOTION FLUOROSCOPY/SWALLOW: CPT

## 2025-01-02 PROCEDURE — 2500000003 HC RX 250 WO HCPCS

## 2025-01-02 PROCEDURE — 80048 BASIC METABOLIC PNL TOTAL CA: CPT

## 2025-01-02 PROCEDURE — 74230 X-RAY XM SWLNG FUNCJ C+: CPT

## 2025-01-02 PROCEDURE — 85025 COMPLETE CBC W/AUTO DIFF WBC: CPT

## 2025-01-02 PROCEDURE — 6370000000 HC RX 637 (ALT 250 FOR IP): Performed by: PSYCHIATRY & NEUROLOGY

## 2025-01-02 RX ORDER — BUTALBITAL, ACETAMINOPHEN AND CAFFEINE 300; 40; 50 MG/1; MG/1; MG/1
1 CAPSULE ORAL EVERY 4 HOURS PRN
Status: DISCONTINUED | OUTPATIENT
Start: 2025-01-02 | End: 2025-01-03 | Stop reason: HOSPADM

## 2025-01-02 RX ADMIN — ENOXAPARIN SODIUM 40 MG: 100 INJECTION SUBCUTANEOUS at 09:08

## 2025-01-02 RX ADMIN — CARBOXYMETHYLCELLULOSE SODIUM 1 DROP: 5 SOLUTION/ DROPS OPHTHALMIC at 09:18

## 2025-01-02 RX ADMIN — KETOROLAC TROMETHAMINE 15 MG: 30 INJECTION, SOLUTION INTRAMUSCULAR at 03:05

## 2025-01-02 RX ADMIN — SODIUM CHLORIDE, PRESERVATIVE FREE 10 ML: 5 INJECTION INTRAVENOUS at 09:09

## 2025-01-02 RX ADMIN — KETOROLAC TROMETHAMINE 15 MG: 30 INJECTION, SOLUTION INTRAMUSCULAR at 17:00

## 2025-01-02 RX ADMIN — GABAPENTIN 300 MG: 300 CAPSULE ORAL at 20:42

## 2025-01-02 RX ADMIN — CARBOXYMETHYLCELLULOSE SODIUM 1 DROP: 5 SOLUTION/ DROPS OPHTHALMIC at 12:56

## 2025-01-02 RX ADMIN — KETOROLAC TROMETHAMINE 15 MG: 30 INJECTION, SOLUTION INTRAMUSCULAR at 09:10

## 2025-01-02 RX ADMIN — MINERAL OIL, PETROLATUM: 425; 568 OINTMENT OPHTHALMIC at 20:43

## 2025-01-02 RX ADMIN — AMLODIPINE BESYLATE 2.5 MG: 2.5 TABLET ORAL at 09:08

## 2025-01-02 RX ADMIN — SODIUM CHLORIDE, PRESERVATIVE FREE 10 ML: 5 INJECTION INTRAVENOUS at 20:45

## 2025-01-02 RX ADMIN — CARBOXYMETHYLCELLULOSE SODIUM 1 DROP: 5 SOLUTION/ DROPS OPHTHALMIC at 17:02

## 2025-01-02 RX ADMIN — ROSUVASTATIN 40 MG: 40 TABLET, FILM COATED ORAL at 20:42

## 2025-01-02 ASSESSMENT — PAIN DESCRIPTION - PAIN TYPE
TYPE: NEUROPATHIC PAIN
TYPE: NEUROPATHIC PAIN

## 2025-01-02 ASSESSMENT — PAIN DESCRIPTION - ORIENTATION
ORIENTATION: RIGHT;LEFT
ORIENTATION: ANTERIOR;RIGHT;LEFT

## 2025-01-02 ASSESSMENT — PAIN DESCRIPTION - LOCATION
LOCATION: GENERALIZED;FOOT
LOCATION: HEAD
LOCATION: HEAD
LOCATION: FOOT

## 2025-01-02 ASSESSMENT — PAIN - FUNCTIONAL ASSESSMENT
PAIN_FUNCTIONAL_ASSESSMENT: PREVENTS OR INTERFERES SOME ACTIVE ACTIVITIES AND ADLS
PAIN_FUNCTIONAL_ASSESSMENT: PREVENTS OR INTERFERES SOME ACTIVE ACTIVITIES AND ADLS
PAIN_FUNCTIONAL_ASSESSMENT: ACTIVITIES ARE NOT PREVENTED
PAIN_FUNCTIONAL_ASSESSMENT: PREVENTS OR INTERFERES SOME ACTIVE ACTIVITIES AND ADLS

## 2025-01-02 ASSESSMENT — PAIN DESCRIPTION - DESCRIPTORS
DESCRIPTORS: ACHING;THROBBING
DESCRIPTORS: ACHING;NUMBNESS
DESCRIPTORS: ACHING
DESCRIPTORS: NUMBNESS

## 2025-01-02 ASSESSMENT — PAIN SCALES - GENERAL
PAINLEVEL_OUTOF10: 5
PAINLEVEL_OUTOF10: 10
PAINLEVEL_OUTOF10: 7
PAINLEVEL_OUTOF10: 6
PAINLEVEL_OUTOF10: 0

## 2025-01-02 ASSESSMENT — ENCOUNTER SYMPTOMS
VOMITING: 0
COUGH: 0
ABDOMINAL PAIN: 0
WHEEZING: 0
CONSTIPATION: 0
DIARRHEA: 0
NAUSEA: 0
BACK PAIN: 0
SHORTNESS OF BREATH: 0
ALLERGIC/IMMUNOLOGIC NEGATIVE: 1

## 2025-01-02 ASSESSMENT — PAIN DESCRIPTION - FREQUENCY
FREQUENCY: CONTINUOUS
FREQUENCY: CONTINUOUS

## 2025-01-02 ASSESSMENT — PAIN DESCRIPTION - ONSET
ONSET: ON-GOING
ONSET: ON-GOING

## 2025-01-02 NOTE — PROGRESS NOTES
3. Nonspecific low-density lesion in the left hepatic lobe measuring 15 mm, possibly a hemangioma.  Recommend nonemergent outpatient correlation with sonography. 4. Partially visualize lymph nodes in the lower axillary region as demonstrated on previous chest CT.  Multiple nonspecific mesenteric, pelvic, inguinal and periaortic lymph nodes measuring up to 15 mm.  PET scan should be considered.     CT CHEST PULMONARY EMBOLISM W CONTRAST    Result Date: 12/16/2024  EXAMINATION: CTA OF THE CHEST 12/16/2024 3:12 am TECHNIQUE: CTA of the chest was performed after the administration of intravenous contrast.  Multiplanar reformatted images are provided for review.  MIP images are provided for review. Automated exposure control, iterative reconstruction, and/or weight based adjustment of the mA/kV was utilized to reduce the radiation dose to as low as reasonably achievable. COMPARISON: None. HISTORY: ORDERING SYSTEM PROVIDED HISTORY: CP, elevated d-dimer TECHNOLOGIST PROVIDED HISTORY: CP, elevated d-dimer Decision Support Exception - unselect if not a suspected or confirmed emergency medical condition->Emergency Medical Condition (MA) Reason for Exam: CP, elevated d-dimer FINDINGS: Pulmonary Arteries: No pulmonary emboli are identified.  The pulmonary arteries are well opacified for evaluation. Mediastinum: No thoracic aortic aneurysm.  No aortic dissection.  No focal esophageal thickening is seen.  There is a pretracheal lymph node measuring 11 mm.  Right hilar lymph node measuring 3.5 x 2.5 cm.  Borderline left hilar lymphadenopathy.  Borderline subcarinal lymphadenopathy. Lungs/pleura: No pulmonary consolidation.  Minimal subsegmental atelectasis. No spiculated lung mass.  No pneumothorax or other acute pulmonary process. Upper Abdomen: No acute inflammatory process seen in the upper abdomen. Upper abdomen not well evaluated to exclude lymphadenopathy.  There is a nonspecific low-attenuation lesion seen within the left  hepatic lobe, measuring 18 x 20 mm in size in segment 3, axial image 198 of series 2.  No other definite liver mass. Soft Tissues/Bones: There are numerous borderline and mildly enlarged lymph nodes seen involving the axilla and subpectoral lymph nodes bilaterally. Numerous subcentimeter supraclavicular lymph nodes are noted as well.  No acute osseous fracture is identified in the spine.  No paraspinal mass.  No significant focal canal stenosis is identified.     1. No pulmonary emboli. 2. No acute pulmonary process. 3. Mediastinal, hilar, and axillary lymphadenopathy. This is nonspecific, and could be seen with lymphoma or metastatic disease. 4. Nonspecific low-attenuation lesion in the left hepatic lobe measuring 18 x 20 mm in size. This could be further evaluated with MRI of the abdomen with and without contrast.  This may represent a mildly complex cyst or hemangioma, though a neoplastic process is in the differential.     XR CHEST PORTABLE    Result Date: 12/16/2024  EXAMINATION: ONE XRAY VIEW OF THE CHEST 12/16/2024 2:29 am COMPARISON: None. HISTORY: ORDERING SYSTEM PROVIDED HISTORY: Chest Pain TECHNOLOGIST PROVIDED HISTORY: Chest Pain Reason for Exam: chest pain back pain joint pain Additional signs and symptoms: chest pain back pain joint pain Relevant Medical/Surgical History: chest pain back pain joint pain FINDINGS: Cardiac and mediastinal silhouettes appear within normal limits for size. Pulmonary vascularity is normal.  No pneumothorax.  No consolidation.  No pleural effusion.  No pulmonary edema.  No acute osseous abnormality is identified.     No acute parenchymal infiltrate.         Physical Examination:        Physical Exam  Constitutional:       Appearance: she is alert  HENT:      Head: Normocephalic and atraumatic.      Mouth/Throat:      Mouth: Mucous membranes are moist.  Face: Left facial droop present  Eyes:      Conjunctiva/sclera: Conjunctivae normal.   Cardiovascular:      Rate and Rhythm:

## 2025-01-02 NOTE — PROGRESS NOTES
Physical Therapy  [facility]   Physical Therapy Treatment  Date: 25  Patient Name: Suzan Garcia       Room: 6-01  MRN: [id number]  Account: [id number]   : 1965  (59 y.o.) Gender: female     Discharge Recommendations:  Further Physical Therapy is recommended upon facility discharge    PT Equipment Recommendations  Equipment Needed:  (TBD)    General  Chart Reviewed: Yes  Patient assessed for rehabilitation services?: Yes  Response To Previous Treatment: Patient with no complaints from previous session.  Family/Caregiver Present: No  Follows Commands: Within Functional Limits    Past Medical History:  has a past medical history of Chronic back pain.  Past Surgical History:   has a past surgical history that includes  section and IR BIOPSY LYMPH NODE SUPERFICIAL (2024).    Restrictions  Restrictions/Precautions  Restrictions/Precautions: Fall Risk, General Precautions  Activity Level: Up as Tolerated, Up with Assist  Required Braces or Orthoses?: No       Subjective  Subjective  Subjective: Pt lying in bed upon arrival, agreeable to therapy. Pt defers use of  despite encouragement       Objective  Orientation  Overall Orientation Status: Within Normal Limits          Transfers  Transfers  Sit to Stand: Minimal Assistance  Stand to Sit: Minimal Assistance     Ambulation      Ambulation  Surface: Level tile  Device: Rolling Walker  Assistance: Minimal assistance  Quality of Gait: impulsive, buckling of R LE  Gait Deviations: Decreased step length, Decreased step height, Staggers  Distance: 15ft x2     Stairs  Stairs/Curb  Stairs?: No    Bed Mobility  Bed mobility  Supine to Sit: Minimal assistance  Scooting: Stand by assistance     Exercises completed:    Exercise 1: bed mobility x1  Exercise 2: STS x2 from bed with RW     Assessment  Assessment  History: Stroke-like Symptoms  Discharge Recommendations: Continue to assess pending progress, 24 hour  supervision or assist  Activity Tolerance  Activity Tolerance: Patient limited by endurance, Patient limited by fatigue       Functional Outcome Measures  AM-PAC Basic Mobility - Inpatient   How much help is needed turning from your back to your side while in a flat bed without using bedrails?: A Little  How much help is needed moving from lying on your back to sitting on the side of a flat bed without using bedrails?: A Little  How much help is needed moving to and from a bed to a chair?: A Little  How much help is needed standing up from a chair using your arms?: A Little  How much help is needed walking in hospital room?: A Little  How much help is needed climbing 3-5 steps with a railing?: Total  AM-PAC Inpatient Mobility Raw Score : 16  AM-PAC Inpatient T-Scale Score : 40.78  Mobility Inpatient CMS 0-100% Score: 54.16  Mobility Inpatient CMS G-Code Modifier : CK     Goals     Short Term Goals  Time Frame for Short Term Goals: 2-3 days  Short Term Goal 1: mod-I bed mobility  Short Term Goal 2: mod-I transfers from various surfaces  Short Term Goal 3: pt able to ambulate with RW mod-I x 50ft for household distances  Short Term Goal 4: pt able to negotiate flight of stairs mod-I to access bathroom/bedroom      Plan  Physical Therapy Plan  General Plan: 1 time a day 7 days a week  Current Treatment Recommendations: Gait training, Stair training, Functional mobility training, Balance training, Transfer training, Therapeutic activities, Safety education & training, Endurance training   Safety Devices  Type of Devices: Call light within reach, Left in chair, All fall risk precautions in place, Gait belt (trasnport came to take pt for testing; sesion terminated)       PT Individual Minutes  Time In: 1450  Time Out: 1459  Minutes: 9           Electronically signed by Tracy Nichols PTA on 1/2/25 at 3:26 PM EST

## 2025-01-02 NOTE — PROGRESS NOTES
Barium video swallow not til 3pm today, residents aware pt does not need to be npo for swallow. Diet order addressed, orders received.

## 2025-01-02 NOTE — PROCEDURES
Sized;Pureed;Thin straw;Thin cup;Mildly Thick cup;Mildly Thick straw    Dysphagia Outcome Severity Scale: Level 7: Normal in all situations  Penetration-Aspiration Scale (PAS): 1 - Material does not enter the airway    Recommended Diet:  Solid consistency: Regular  Liquid consistency: Thin  Medication administration:  (Whole with water)    Safe Swallow Protocol:  Compensatory Swallowing Strategies : Eat/Feed slowly;Upright as possible for all oral intake;Small bites/sips    Recommendations/Treatment  Requires SLP Intervention: No     D/C Recommendations: No follow up therapy recommended post discharge    Education: Results and recommendations were reviewed with pt. following this exam.   Patient Education: yes  Patient Education Response: Verbalizes understanding    Oral Preparation / Oral Phase  Premature vallecular spillage with all consistencies tested.    Pharyngeal Phase  Min vallecular residue with puree, soft solids, mildly thick liquids (cup/straw) and thin liquids (cup/straw). No aspiration/penetration with any consistency tested.     Esophageal Phase  Esophageal Screen: WNL    Pain   Pain Level: 5  Pain Type: Neuropathic pain  Pain Location: Foot      Therapy Time:   Individual Concurrent Group Co-treatment   Time In 1512         Time Out 1527         Minutes 15           Sulma Kumar M.S. CF-SLP  1/2/2025, 3:38 PM

## 2025-01-02 NOTE — PROGRESS NOTES
Son on phone and pt's family member at bedside aware of transfer to med surg room 2066. Verbalizes understanding. Belongings moved to new room. Pt eating dinner and will transfer via wc

## 2025-01-02 NOTE — PROGRESS NOTES
[facility] Neurology   IN-PATIENT SERVICE      NEUROLOGY PROGRESS  NOTE            Date:   1/2/2025  Patient name:  Suzan Garcia  Date of admission:  12/26/2024  YOB: 1965      Interval History:     1/2: States dysphagia has improved.  Underwent MBS with speech therapy.  Reports paresthesias are also improving.  No new neurological symptoms.    1/1: Remains neurologically stable.  Facial droop appears improved.  States that weakness is improving as well.  Denies dyspnea.  Does state that has had intermittent mild dysphagia since admission.  Has been tolerating soft foods.    12/31: Remains neurologically stable.  Feels weakness is continuing to improve.  Has been complaining of some intermittent paresthesias in the lower extremities.  Per nursing staff, she is now 1 person assist whereas previously required 2 people.    12/30: No acute events.  Spoke with patient using interpreting services.  She states that she continues to feel weakness is improving with IVIG.  No new neurological symptoms.    History of Present Illness:     Per my partner:    The patient is a 59 y.o. female who presents with Facial Droop and Extremity Weakness  . The patient was seen and examined and the chart was reviewed.  Patient was recently discharged from the hospital after coming in for abdominal pain, findings of generalized lymphadenopathy, status post excisional cervical node biopsy and discharged on 12/24.  Since that time she has noticed that there is numbness in her face, weakness on the left side of her face, in addition endorses numbness and tingling in her fingertips as well as lower extremities.  She has felt like both lower extremities are weaker than normal.  Going back to the chart she also was seen in the ED on 12/16 and at that time had complaints of back pain and numbness and tingling.  It appears that the symptoms have been progressing since that time.  Patient is only [place]ian-speaking and much of the

## 2025-01-02 NOTE — PLAN OF CARE
Problem: Discharge Planning  Goal: Discharge to home or other facility with appropriate resources  Outcome: Progressing  DC barrier: placement to [facility] pending insurance auth     Problem: Safety - Adult  Goal: Free from fall injury  1/2/2025 1710 by Alyssa Unger, RN  Outcome: Progressing  Pt assessed as a fall risk this shift. Remains free from falls and accidental injury at this time. Fall precautions in place, including falling star sign and fall risk band on pt. Floor free from obstacles, and bed is locked and in lowest position. Adequate lighting provided.  Pt encouraged to call before getting OOB for any need.  Bed alarm activated. Will continue to monitor needs during hourly rounding, and reinforce education on use of call light.     Problem: Pain  Goal: Verbalizes/displays adequate comfort level or baseline comfort level  1/2/2025 1710 by Alyssa Unger, RN  Outcome: Progressing  Pt medicated with pain medication prn.  Assessed all pain characteristics including level, type, location, frequency, and onset.  Non-pharmacologic interventions offered to pt as well.  Pt states pain is tolerable at this time.

## 2025-01-02 NOTE — PLAN OF CARE
Problem: Discharge Planning  Goal: Discharge to home or other facility with appropriate resources  1/1/2025 1743 by Krystal Garza RN  Outcome: Progressing  Flowsheets (Taken 1/1/2025 1149)  Discharge to home or other facility with appropriate resources:   Identify barriers to discharge with patient and caregiver   Arrange for needed discharge resources and transportation as appropriate   Identify discharge learning needs (meds, wound care, etc)   Refer to discharge planning if patient needs post-hospital services based on physician order or complex needs related to functional status, cognitive ability or social support system     Problem: Safety - Adult  Goal: Free from fall injury  1/2/2025 0415 by Melani Cotto RN  Outcome: Progressing  Flowsheets (Taken 12/31/2024 1723 by Melody Donald, RN)  Free From Fall Injury: Instruct family/caregiver on patient safety  1/1/2025 1743 by Krystal Garza RN  Outcome: Progressing     Problem: Pain  Goal: Verbalizes/displays adequate comfort level or baseline comfort level  1/2/2025 0415 by Melani Cotto RN  Outcome: Progressing  Flowsheets (Taken 12/30/2024 0322 by Bhavya Nuno, RN)  Verbalizes/displays adequate comfort level or baseline comfort level:   Assess pain using appropriate pain scale   Administer analgesics based on type and severity of pain and evaluate response  1/1/2025 1743 by Krystal Garza RN  Outcome: Progressing     Problem: Chronic Conditions and Co-morbidities  Goal: Patient's chronic conditions and co-morbidity symptoms are monitored and maintained or improved  1/2/2025 0415 by Melani Cotto RN  Outcome: Progressing  Flowsheets (Taken 1/1/2025 1149 by Krystal Garza RN)  Care Plan - Patient's Chronic Conditions and Co-Morbidity Symptoms are Monitored and Maintained or Improved:   Monitor and assess patient's chronic conditions and comorbid symptoms for stability, deterioration, or improvement   Collaborate with multidisciplinary team to

## 2025-01-02 NOTE — CARE COORDINATION
DISCHARGE PLANNING NOTE:    LSW following for potential discharge to ARU PM&R completed. Patient is appropriate for inpatient rehab per Dr. Carson. Insurance authorization submitted on 12/31.

## 2025-01-03 ENCOUNTER — HOSPITAL ENCOUNTER (INPATIENT)
Age: 60
LOS: 13 days | Discharge: HOME | End: 2025-01-16
Attending: GENERAL PRACTICE | Admitting: GENERAL PRACTICE
Payer: COMMERCIAL

## 2025-01-03 VITALS
HEIGHT: 61 IN | TEMPERATURE: 98.2 F | BODY MASS INDEX: 22.68 KG/M2 | WEIGHT: 120.15 LBS | DIASTOLIC BLOOD PRESSURE: 71 MMHG | SYSTOLIC BLOOD PRESSURE: 133 MMHG | RESPIRATION RATE: 16 BRPM | OXYGEN SATURATION: 97 % | HEART RATE: 70 BPM

## 2025-01-03 DIAGNOSIS — G61.0 AIDP (ACUTE INFLAMMATORY DEMYELINATING POLYNEUROPATHY) (HCC): ICD-10-CM

## 2025-01-03 DIAGNOSIS — G43.909 MIGRAINE WITHOUT STATUS MIGRAINOSUS, NOT INTRACTABLE, UNSPECIFIED MIGRAINE TYPE: Primary | ICD-10-CM

## 2025-01-03 DIAGNOSIS — G82.20 PARAPARESIS (HCC): ICD-10-CM

## 2025-01-03 LAB
ANION GAP SERPL CALCULATED.3IONS-SCNC: 6 MMOL/L (ref 9–16)
BASOPHILS # BLD: 0.29 K/UL (ref 0–0.2)
BASOPHILS NFR BLD: 4 % (ref 0–2)
BUN SERPL-MCNC: 20 MG/DL (ref 6–20)
CALCIUM SERPL-MCNC: 9 MG/DL (ref 8.6–10.4)
CHLORIDE SERPL-SCNC: 104 MMOL/L (ref 98–107)
CO2 SERPL-SCNC: 26 MMOL/L (ref 20–31)
CREAT SERPL-MCNC: 0.6 MG/DL (ref 0.7–1.2)
EOSINOPHIL # BLD: 0.58 K/UL (ref 0–0.4)
EOSINOPHILS RELATIVE PERCENT: 8 % (ref 0–4)
ERYTHROCYTE [DISTWIDTH] IN BLOOD BY AUTOMATED COUNT: 14.2 % (ref 11.5–14.9)
GFR, ESTIMATED: >90 ML/MIN/1.73M2
GLUCOSE SERPL-MCNC: 106 MG/DL (ref 74–99)
HCT VFR BLD AUTO: 34.9 % (ref 36–46)
HGB BLD-MCNC: 11.9 G/DL (ref 12–16)
LYMPHOCYTES NFR BLD: 1.61 K/UL (ref 1–4.8)
LYMPHOCYTES RELATIVE PERCENT: 22 % (ref 24–44)
MCH RBC QN AUTO: 27.8 PG (ref 26–34)
MCHC RBC AUTO-ENTMCNC: 34.1 G/DL (ref 31–37)
MCV RBC AUTO: 81.6 FL (ref 80–100)
MONOCYTES NFR BLD: 0.58 K/UL (ref 0.1–1.3)
MONOCYTES NFR BLD: 8 % (ref 1–7)
MORPHOLOGY: ABNORMAL
MORPHOLOGY: ABNORMAL
NEUTROPHILS NFR BLD: 58 % (ref 36–66)
NEUTS SEG NFR BLD: 4.24 K/UL (ref 1.3–9.1)
PLATELET # BLD AUTO: 300 K/UL (ref 150–450)
PMV BLD AUTO: 7.1 FL (ref 6–12)
POTASSIUM SERPL-SCNC: 4 MMOL/L (ref 3.7–5.3)
RBC # BLD AUTO: 4.28 M/UL (ref 4–5.2)
SODIUM SERPL-SCNC: 136 MMOL/L (ref 136–145)
WBC OTHER # BLD: 7.3 K/UL (ref 3.5–11)

## 2025-01-03 PROCEDURE — 97129 THER IVNTJ 1ST 15 MIN: CPT

## 2025-01-03 PROCEDURE — 2500000003 HC RX 250 WO HCPCS

## 2025-01-03 PROCEDURE — 94799 UNLISTED PULMONARY SVC/PX: CPT

## 2025-01-03 PROCEDURE — 97530 THERAPEUTIC ACTIVITIES: CPT

## 2025-01-03 PROCEDURE — 85025 COMPLETE CBC W/AUTO DIFF WBC: CPT

## 2025-01-03 PROCEDURE — 36415 COLL VENOUS BLD VENIPUNCTURE: CPT

## 2025-01-03 PROCEDURE — 6370000000 HC RX 637 (ALT 250 FOR IP): Performed by: PSYCHIATRY & NEUROLOGY

## 2025-01-03 PROCEDURE — 97535 SELF CARE MNGMENT TRAINING: CPT

## 2025-01-03 PROCEDURE — 6370000000 HC RX 637 (ALT 250 FOR IP)

## 2025-01-03 PROCEDURE — 97116 GAIT TRAINING THERAPY: CPT

## 2025-01-03 PROCEDURE — 99232 SBSQ HOSP IP/OBS MODERATE 35: CPT | Performed by: PSYCHIATRY & NEUROLOGY

## 2025-01-03 PROCEDURE — 1180000000 HC REHAB R&B

## 2025-01-03 PROCEDURE — 6370000000 HC RX 637 (ALT 250 FOR IP): Performed by: INTERNAL MEDICINE

## 2025-01-03 PROCEDURE — 99239 HOSP IP/OBS DSCHRG MGMT >30: CPT

## 2025-01-03 PROCEDURE — 80048 BASIC METABOLIC PNL TOTAL CA: CPT

## 2025-01-03 PROCEDURE — 97130 THER IVNTJ EA ADDL 15 MIN: CPT

## 2025-01-03 PROCEDURE — 94761 N-INVAS EAR/PLS OXIMETRY MLT: CPT

## 2025-01-03 PROCEDURE — 6360000002 HC RX W HCPCS

## 2025-01-03 RX ORDER — LIDOCAINE 4 G/G
1 PATCH TOPICAL DAILY
Status: CANCELLED | OUTPATIENT
Start: 2025-01-03

## 2025-01-03 RX ORDER — TIZANIDINE 2 MG/1
2 TABLET ORAL 2 TIMES DAILY PRN
Qty: 20 TABLET | Refills: 0 | Status: ON HOLD | OUTPATIENT
Start: 2025-01-03 | End: 2025-01-13

## 2025-01-03 RX ORDER — KETOROLAC TROMETHAMINE 30 MG/ML
15 INJECTION, SOLUTION INTRAMUSCULAR; INTRAVENOUS EVERY 8 HOURS
Status: CANCELLED | OUTPATIENT
Start: 2025-01-03 | End: 2025-01-04

## 2025-01-03 RX ORDER — MINERAL OIL AND WHITE PETROLATUM 150; 830 MG/G; MG/G
OINTMENT OPHTHALMIC
Status: CANCELLED | OUTPATIENT
Start: 2025-01-03

## 2025-01-03 RX ORDER — LIDOCAINE 4 G/G
1 PATCH TOPICAL DAILY
Status: DISCONTINUED | OUTPATIENT
Start: 2025-01-03 | End: 2025-01-11

## 2025-01-03 RX ORDER — ROSUVASTATIN CALCIUM 40 MG/1
40 TABLET, COATED ORAL NIGHTLY
Status: CANCELLED | OUTPATIENT
Start: 2025-01-03

## 2025-01-03 RX ORDER — KETOROLAC TROMETHAMINE 30 MG/ML
15 INJECTION, SOLUTION INTRAMUSCULAR; INTRAVENOUS EVERY 8 HOURS
Status: COMPLETED | OUTPATIENT
Start: 2025-01-04 | End: 2025-01-04

## 2025-01-03 RX ORDER — ENOXAPARIN SODIUM 100 MG/ML
40 INJECTION SUBCUTANEOUS DAILY
Status: CANCELLED | OUTPATIENT
Start: 2025-01-04

## 2025-01-03 RX ORDER — AMLODIPINE BESYLATE 2.5 MG/1
2.5 TABLET ORAL DAILY
Status: DISCONTINUED | OUTPATIENT
Start: 2025-01-04 | End: 2025-01-13

## 2025-01-03 RX ORDER — BUTALBITAL, ACETAMINOPHEN AND CAFFEINE 300; 40; 50 MG/1; MG/1; MG/1
1 CAPSULE ORAL EVERY 4 HOURS PRN
Status: DISCONTINUED | OUTPATIENT
Start: 2025-01-03 | End: 2025-01-16 | Stop reason: HOSPADM

## 2025-01-03 RX ORDER — POLYETHYLENE GLYCOL 3350 17 G/17G
17 POWDER, FOR SOLUTION ORAL DAILY
Status: CANCELLED | OUTPATIENT
Start: 2025-01-03

## 2025-01-03 RX ORDER — SENNOSIDES A AND B 8.6 MG/1
2 TABLET, FILM COATED ORAL DAILY PRN
Status: CANCELLED | OUTPATIENT
Start: 2025-01-03

## 2025-01-03 RX ORDER — BUTALBITAL, ACETAMINOPHEN AND CAFFEINE 300; 40; 50 MG/1; MG/1; MG/1
1 CAPSULE ORAL EVERY 4 HOURS PRN
Status: CANCELLED | OUTPATIENT
Start: 2025-01-03

## 2025-01-03 RX ORDER — BISACODYL 10 MG
10 SUPPOSITORY, RECTAL RECTAL DAILY PRN
Status: CANCELLED | OUTPATIENT
Start: 2025-01-03

## 2025-01-03 RX ORDER — ROSUVASTATIN CALCIUM 40 MG/1
40 TABLET, COATED ORAL NIGHTLY
Qty: 30 TABLET | Refills: 3 | Status: ON HOLD | OUTPATIENT
Start: 2025-01-03

## 2025-01-03 RX ORDER — CARBOXYMETHYLCELLULOSE SODIUM 5 MG/ML
1 SOLUTION/ DROPS OPHTHALMIC 4 TIMES DAILY
Status: CANCELLED | OUTPATIENT
Start: 2025-01-03

## 2025-01-03 RX ORDER — POLYETHYLENE GLYCOL 3350 17 G/17G
17 POWDER, FOR SOLUTION ORAL DAILY
Status: DISCONTINUED | OUTPATIENT
Start: 2025-01-03 | End: 2025-01-16 | Stop reason: HOSPADM

## 2025-01-03 RX ORDER — CARBOXYMETHYLCELLULOSE SODIUM 5 MG/ML
1 SOLUTION/ DROPS OPHTHALMIC 2 TIMES DAILY
Qty: 1 EACH | Refills: 0 | Status: ON HOLD | OUTPATIENT
Start: 2025-01-03

## 2025-01-03 RX ORDER — ENOXAPARIN SODIUM 100 MG/ML
40 INJECTION SUBCUTANEOUS DAILY
Status: DISCONTINUED | OUTPATIENT
Start: 2025-01-04 | End: 2025-01-16 | Stop reason: HOSPADM

## 2025-01-03 RX ORDER — ACETAMINOPHEN 325 MG/1
650 TABLET ORAL EVERY 4 HOURS PRN
Status: CANCELLED | OUTPATIENT
Start: 2025-01-03

## 2025-01-03 RX ORDER — SENNOSIDES A AND B 8.6 MG/1
2 TABLET, FILM COATED ORAL DAILY PRN
Status: DISCONTINUED | OUTPATIENT
Start: 2025-01-03 | End: 2025-01-16 | Stop reason: HOSPADM

## 2025-01-03 RX ORDER — HYDRALAZINE HYDROCHLORIDE 20 MG/ML
10 INJECTION INTRAMUSCULAR; INTRAVENOUS EVERY 6 HOURS PRN
Status: DISCONTINUED | OUTPATIENT
Start: 2025-01-03 | End: 2025-01-04

## 2025-01-03 RX ORDER — ROSUVASTATIN CALCIUM 40 MG/1
40 TABLET, COATED ORAL NIGHTLY
Status: DISCONTINUED | OUTPATIENT
Start: 2025-01-03 | End: 2025-01-16 | Stop reason: HOSPADM

## 2025-01-03 RX ORDER — GABAPENTIN 300 MG/1
300 CAPSULE ORAL NIGHTLY
Qty: 30 CAPSULE | Refills: 0 | Status: ON HOLD | OUTPATIENT
Start: 2025-01-03 | End: 2025-02-02

## 2025-01-03 RX ORDER — BISACODYL 10 MG
10 SUPPOSITORY, RECTAL RECTAL DAILY PRN
Status: DISCONTINUED | OUTPATIENT
Start: 2025-01-03 | End: 2025-01-16 | Stop reason: HOSPADM

## 2025-01-03 RX ORDER — ONDANSETRON 4 MG/1
4 TABLET, ORALLY DISINTEGRATING ORAL EVERY 8 HOURS PRN
Status: CANCELLED | OUTPATIENT
Start: 2025-01-03

## 2025-01-03 RX ORDER — HYDRALAZINE HYDROCHLORIDE 20 MG/ML
10 INJECTION INTRAMUSCULAR; INTRAVENOUS EVERY 6 HOURS PRN
Status: CANCELLED | OUTPATIENT
Start: 2025-01-03

## 2025-01-03 RX ORDER — AMLODIPINE BESYLATE 2.5 MG/1
2.5 TABLET ORAL DAILY
Status: CANCELLED | OUTPATIENT
Start: 2025-01-04

## 2025-01-03 RX ORDER — GABAPENTIN 300 MG/1
300 CAPSULE ORAL NIGHTLY
Status: DISCONTINUED | OUTPATIENT
Start: 2025-01-03 | End: 2025-01-04

## 2025-01-03 RX ORDER — ACETAMINOPHEN 325 MG/1
650 TABLET ORAL EVERY 4 HOURS PRN
Status: DISCONTINUED | OUTPATIENT
Start: 2025-01-03 | End: 2025-01-16 | Stop reason: HOSPADM

## 2025-01-03 RX ORDER — ONDANSETRON 4 MG/1
4 TABLET, ORALLY DISINTEGRATING ORAL EVERY 8 HOURS PRN
Status: DISCONTINUED | OUTPATIENT
Start: 2025-01-03 | End: 2025-01-16 | Stop reason: HOSPADM

## 2025-01-03 RX ORDER — CARBOXYMETHYLCELLULOSE SODIUM 5 MG/ML
1 SOLUTION/ DROPS OPHTHALMIC 4 TIMES DAILY
Status: DISCONTINUED | OUTPATIENT
Start: 2025-01-04 | End: 2025-01-16 | Stop reason: HOSPADM

## 2025-01-03 RX ORDER — ONDANSETRON 2 MG/ML
4 INJECTION INTRAMUSCULAR; INTRAVENOUS EVERY 6 HOURS PRN
Status: DISCONTINUED | OUTPATIENT
Start: 2025-01-03 | End: 2025-01-16 | Stop reason: HOSPADM

## 2025-01-03 RX ORDER — ONDANSETRON 2 MG/ML
4 INJECTION INTRAMUSCULAR; INTRAVENOUS EVERY 6 HOURS PRN
Status: CANCELLED | OUTPATIENT
Start: 2025-01-03

## 2025-01-03 RX ORDER — GABAPENTIN 300 MG/1
300 CAPSULE ORAL NIGHTLY
Status: CANCELLED | OUTPATIENT
Start: 2025-01-03

## 2025-01-03 RX ORDER — MINERAL OIL AND WHITE PETROLATUM 150; 830 MG/G; MG/G
OINTMENT OPHTHALMIC
Status: DISCONTINUED | OUTPATIENT
Start: 2025-01-03 | End: 2025-01-16 | Stop reason: HOSPADM

## 2025-01-03 RX ADMIN — TIZANIDINE 4 MG: 4 TABLET ORAL at 06:29

## 2025-01-03 RX ADMIN — CARBOXYMETHYLCELLULOSE SODIUM 1 DROP: 5 SOLUTION/ DROPS OPHTHALMIC at 08:59

## 2025-01-03 RX ADMIN — CARBOXYMETHYLCELLULOSE SODIUM 1 DROP: 5 SOLUTION/ DROPS OPHTHALMIC at 17:52

## 2025-01-03 RX ADMIN — ROSUVASTATIN 40 MG: 40 TABLET, FILM COATED ORAL at 20:17

## 2025-01-03 RX ADMIN — SODIUM CHLORIDE, PRESERVATIVE FREE 10 ML: 5 INJECTION INTRAVENOUS at 08:58

## 2025-01-03 RX ADMIN — KETOROLAC TROMETHAMINE 15 MG: 30 INJECTION, SOLUTION INTRAMUSCULAR at 01:47

## 2025-01-03 RX ADMIN — CARBOXYMETHYLCELLULOSE SODIUM 1 DROP: 5 SOLUTION/ DROPS OPHTHALMIC at 13:09

## 2025-01-03 RX ADMIN — GABAPENTIN 300 MG: 300 CAPSULE ORAL at 20:17

## 2025-01-03 RX ADMIN — BUTALBITA,ACETAMINOPHEN AND CAFFEINE 1 CAPSULE: 50; 300; 40 CAPSULE ORAL at 13:08

## 2025-01-03 RX ADMIN — ENOXAPARIN SODIUM 40 MG: 100 INJECTION SUBCUTANEOUS at 08:58

## 2025-01-03 RX ADMIN — AMLODIPINE BESYLATE 2.5 MG: 2.5 TABLET ORAL at 08:58

## 2025-01-03 ASSESSMENT — ENCOUNTER SYMPTOMS
BACK PAIN: 0
ALLERGIC/IMMUNOLOGIC NEGATIVE: 1
WHEEZING: 0
VOMITING: 0
COUGH: 0
CONSTIPATION: 0
SHORTNESS OF BREATH: 0
NAUSEA: 0
DIARRHEA: 0
ABDOMINAL PAIN: 0

## 2025-01-03 ASSESSMENT — PAIN SCALES - GENERAL: PAINLEVEL_OUTOF10: 7

## 2025-01-03 ASSESSMENT — PAIN DESCRIPTION - LOCATION: LOCATION: HEAD

## 2025-01-03 ASSESSMENT — PAIN DESCRIPTION - DESCRIPTORS: DESCRIPTORS: ACHING

## 2025-01-03 NOTE — PLAN OF CARE
Problem: Discharge Planning  Goal: Discharge to home or other facility with appropriate resources  1/3/2025 0338 by Saida Bowden RN  Outcome: Progressing  Flowsheets (Taken 1/3/2025 0000)  Discharge to home or other facility with appropriate resources: Identify barriers to discharge with patient and caregiver     Problem: Safety - Adult  Goal: Free from fall injury  1/3/2025 0338 by Saida Bowden RN  Outcome: Progressing     Problem: Pain  Goal: Verbalizes/displays adequate comfort level or baseline comfort level  1/3/2025 0338 by Saida Bowden RN  Outcome: Progressing  Flowsheets (Taken 1/3/2025 0330)  Verbalizes/displays adequate comfort level or baseline comfort level:   Encourage patient to monitor pain and request assistance   Assess pain using appropriate pain scale   Administer analgesics based on type and severity of pain and evaluate response   Implement non-pharmacological measures as appropriate and evaluate response     Problem: Chronic Conditions and Co-morbidities  Goal: Patient's chronic conditions and co-morbidity symptoms are monitored and maintained or improved  Outcome: Progressing  Flowsheets (Taken 1/3/2025 0000)  Care Plan - Patient's Chronic Conditions and Co-Morbidity Symptoms are Monitored and Maintained or Improved: Monitor and assess patient's chronic conditions and comorbid symptoms for stability, deterioration, or improvement     Problem: Skin/Tissue Integrity  Goal: Absence of new skin breakdown  Description: 1.  Monitor for areas of redness and/or skin breakdown  2.  Assess vascular access sites hourly  3.  Every 4-6 hours minimum:  Change oxygen saturation probe site  4.  Every 4-6 hours:  If on nasal continuous positive airway pressure, respiratory therapy assess nares and determine need for appliance change or resting period.  Outcome: Progressing     Problem: ABCDS Injury Assessment  Goal: Absence of physical injury  Outcome: Progressing     Problem: Neurosensory - Adult  Goal:

## 2025-01-03 NOTE — CARE COORDINATION
Session ID: Unavailable  Language: [place]   ID: #[id number]   Name: Claire     Electronically signed by DIETER Gandhi on 1/3/2025 at 11:57 AM

## 2025-01-03 NOTE — PROGRESS NOTES
[facility] Neurology   IN-PATIENT SERVICE      NEUROLOGY PROGRESS  NOTE            Date:   1/3/2025  Patient name:  Suzan Garcia  Date of admission:  12/26/2024  YOB: 1965      Interval History:     1/3: No acute complaints.  Pending dc to ARU.    1/2: States dysphagia has improved.  Underwent MBS with speech therapy.  Reports paresthesias are also improving.  No new neurological symptoms.    1/1: Remains neurologically stable.  Facial droop appears improved.  States that weakness is improving as well.  Denies dyspnea.  Does state that has had intermittent mild dysphagia since admission.  Has been tolerating soft foods.    12/31: Remains neurologically stable.  Feels weakness is continuing to improve.  Has been complaining of some intermittent paresthesias in the lower extremities.  Per nursing staff, she is now 1 person assist whereas previously required 2 people.    12/30: No acute events.  Spoke with patient using interpreting services.  She states that she continues to feel weakness is improving with IVIG.  No new neurological symptoms.    History of Present Illness:     Per my partner:    The patient is a 59 y.o. female who presents with Facial Droop and Extremity Weakness  . The patient was seen and examined and the chart was reviewed.  Patient was recently discharged from the hospital after coming in for abdominal pain, findings of generalized lymphadenopathy, status post excisional cervical node biopsy and discharged on 12/24.  Since that time she has noticed that there is numbness in her face, weakness on the left side of her face, in addition endorses numbness and tingling in her fingertips as well as lower extremities.  She has felt like both lower extremities are weaker than normal.  Going back to the chart she also was seen in the ED on 12/16 and at that time had complaints of back pain and numbness and tingling.  It appears that the symptoms have been progressing since that time.   Patient is only Mozambican-speaking and much of the history is obtained from her son through translation at bedside.     At this time she is awake alert and oriented.  There is obvious weakness of the full left side of her face upper and lower involvement.  Bilateral upper extremity strength is full.  Left lower extremity mildly weak with more significant weakness in the right lower extremity.  She does endorse numbness and tingling all throughout although sensory exam is fairly normal.  She is hyporeflexic throughout.     MRI of the brain without acute abnormalities.  MRI cervical spine without any significant stenosis.  MRI thoracic spine with mild canal stenosis at T8-9 secondary to posterior disc protrusion.  MRI lumbar spine central disc protrusion at L4-5 moderately narrowing spinal canal.  Posterior disc bulge L3-4 with mild canal stenosis.  Mild bilateral neural foraminal narrowing L3-4, L4-5.    Past Medical History:     Past Medical History:   Diagnosis Date    Chronic back pain         Past Surgical History:     Past Surgical History:   Procedure Laterality Date     SECTION      IR BIOPSY LYMPH NODE SUPERFICIAL  2024    IR BIOPSY LYMPH NODE SUPERFICIAL 2024 [facility] SPECIAL PROCEDURES        Medications during admission:      gabapentin  300 mg Oral Nightly    ketorolac  15 mg IntraVENous q8h    lidocaine  1 patch TransDERmal Daily    carboxymethylcellulose  1 drop Left Eye 4x Daily    amLODIPine  2.5 mg Oral Daily    sodium chloride flush  5-40 mL IntraVENous 2 times per day    enoxaparin  40 mg SubCUTAneous Daily    rosuvastatin  40 mg Oral Nightly         Physical Exam:   /81   Pulse 72   Temp 98.3 °F (36.8 °C) (Oral)   Resp 16   Ht 1.54 m (5' 0.63\")   Wt 54.5 kg (120 lb 2.4 oz)   LMP 10/13/2015 (Approximate) Comment: possible menopause  SpO2 98%   BMI 22.98 kg/m²   Temp (24hrs), Av.5 °F (36.9 °C), Min:98.3 °F (36.8 °C), Max:98.7 °F (37.1 °C)          Neurological

## 2025-01-03 NOTE — PROGRESS NOTES
Speech Language Pathology  [facility] MED SURG    Cognitive Treatment Note    Date: 1/3/2025  Patient’s Name: Suzan Garcia  MRN: [id number]  Diagnosis:   Patient Active Problem List   Diagnosis Code    ASCUS with positive high risk HPV cervical R87.610, R87.810    Screening mammogram for breast cancer Z12.31    Sleep disturbance G47.9    Cholelithiasis without cholangitis K80.20    Biliary colic K80.50    Cervical lymphadenopathy R59.0    Lymphadenopathy, generalized R59.1    Thrombocytopenia (HCC) D69.6    Leg weakness, bilateral R29.898    Bell's palsy G51.0    Guillain Barré syndrome (HCC) G61.0       Pain Rating: None reported    Cognitive Treatment    Treatment time: 9073-1518      Subjective: [x] Alert [x] Cooperative     [] Confused     [] Agitated    [] Lethargic      Objective/Assessment:    Recall: Category inclusion: 9/10 increased to 10/10 with repetition of stimuli.  Short-term recall of 3 related units (5-minute delay): 3/3.    Problem Solving/Reasoning: Opposites: 2/5 increased to 3/5 with mod verbal cues.    Facial exercises: Pt. Continues to report weakness on the left side of her face. Pt. Instructed to complete facial exercises to increase L strength. Pt. Completed exercises x3 sets x10 reps each with min-mod verbal/visual cues. ST encouraged pt. To complete t/o the day with pt. Verbalizing understanding.    Other: Pt. Spouse present.  used t/o session. Call light left within reach.    Plan:  [x] Continue ST services    [] Discharge from ST:      Discharge recommendations:  [x] Further therapy recommended at discharge.   [] No therapy recommended at discharge.      Treatment completed by: Sulma Kumar M.S. CF-SLP

## 2025-01-03 NOTE — CARE COORDINATION
ACUTE INPATIENT REHABILITATION  [facility]  PRE-ADMISSION ASSESSMENT    Patient Name: Suzan Garcia        MRN: [id number]    : 1965  (59 y.o.)  Gender: female     Admitted from:  [facility]     Type of Admission:  New Admission     Date of Onset / Admission to the Acute Hospital:  2024    Inpatient Rehabilitation Admitting Diagnosis:  Guillain Powder[place] Syndrome with paraparesis     Did patient have surgery/procedures?  No     Physicians:   Edita Hawthorne MD  Physician  Internal Medicine    Hong Woo MD  Physician  Internal Medicine    Baljit Ross MD  Physician  Internal Medicine    Charlene Tejada DO  Physician  Specialty: Neurology    Lloyd Gandhi DO  Physician  Neurology    Landon Conner MD  Physician  Orthopedic Surgery    Risk for Clinical Complications:  Low     Co-morbidities:    Guillain-Powderly syndrome with paraparesis  Cognitive impairment  Dysarthria  Recently noted thoracic and abdominal  lymphadenopathy  Recently found cholelithiasis without cholecystitis  HTN    Financial Information  Primary insurance: Commercial Insurance:[facility]       Secondary Insurance: None     Precautions:   []Cardiac Precautions: No Cardiac Precautions  []Total hip precautions: No Total Hip Precautions  []Weight Bearing status: No Weight Bearing Restrictions  []Other Precautions: No Other Precautions Specified  [x]Safety Precautions/Concerns:  Fall Risk, General Precautions  [x]Visually impaired: Yes: Wears glasses     []Hard of Hearing: Within Functional Limits       Communication Aids, Devices or Interpretation Services Required: Yes: Speaks Afghan      Isolation Precautions:  None: Standard Precautions       Physiatrist: Dr. Ann Carson      Patients Occupation: Unemployed, not seeking work    Reviewed Lab and Diagnostic reports from Current Admission: Yes    Patients Prior Functional  Level: Prior Function  Prior Level of Assist for

## 2025-01-03 NOTE — PROGRESS NOTES
of vibratory, touch and pain sensations below knee   Reflexes: Decreased in both upper and lower extremities, downgoing    Assessment:        Primary Problem  Stroke-like symptoms    Active Hospital Problems    Diagnosis Date Noted    Guillain Barré syndrome (HCC) [G61.0] 12/28/2024    Leg weakness, bilateral [R29.898] 12/27/2024    Bell's palsy [G51.0] 12/27/2024       Plan:        Patient status Admit as inpatient in the  Progressive Unit/Step down     Acute bilateral lower extremity weakness, left facial droop secondary to GBS  Presented with bilateral lower extremity weakness left more than right associated with loss of sensation in bilateral lower and extremities  CT head and CTA head and neck showed no acute intracranial abnormalities  MRI brain showed no acute intracranial abnormalities, except for some microvascular changes.MRI cervical spine showed mild spinal canal stenosis at C6-C7 and T1-T2.  MRI thoracic spine showed mild spinal stenosis at T8-T9 secondary to posterior disc protrusion.  MRI lumbar spine showed central disc protrusion at L4-L5, moderately narrows the spinal canal.  Posterior disc bulge at L3-L4 and mild spinal canal stenosis.  Mild bilateral neural foraminal narrowing at L3-L4 and L4-L5.  Not a TNK candidate, outside of the window  Loaded with aspirin and Plavix in ER, discontinued after MRI.   Lyme serology negative  CSF culture negative, Glucose 58, protein 155, meningitis encephalitis panel negative oligoclonal bands IgG index 0.65, albumin index 207.8  Ganglioside antibodies negative  Passed bedside swallow and speech evaluation, diet resumed  CSF was positive for albuminocytologic dissociation, suggestive of GBS  Recent NIF is -50, VC is 2.3 on 1/1/2025, monitoring respiratory status  Artificial tears for dry eyes  Started IVIG, 5th day today  Nerve conduction study/EMG outpatient  Neurology following    Back pain  MRI of the spine showed multilevel spinal stenosis  On Toradol for

## 2025-01-03 NOTE — PROGRESS NOTES
Occupational Therapy  Facility/Department: [facility] MED SURG  Daily Treatment Note  NAME: Suzan Garcia  : 1965  MRN: [id number]    Date of Service: 1/3/2025    RN reports patient is medically stable for therapy treatment this date.    Chart reviewed prior to treatment and patient is agreeable for therapy.  All lines intact and patient positioned comfortably at end of treatment.  All patient needs addressed prior to ending therapy session.      Discharge Recommendations:  Continue to assess pending progress, 24 hour supervision or assist  OT Equipment Recommendations  Other: TBD    Patient Diagnosis(es): The primary encounter diagnosis was Facial droop. Diagnoses of Acute ischemic stroke (HCC), Leg weakness, bilateral, Chronic bilateral low back pain, unspecified whether sciatica present, Stroke-like symptoms, and Guillain Barré syndrome (HCC) were also pertinent to this visit.     Assessment   Activity Tolerance: Patient limited by endurance;Patient limited by fatigue  Discharge Recommendations: Continue to assess pending progress;24 hour supervision or assist  Other: TBD     Plan  Occupational Therapy Plan  Times Per Week: 4-6  Current Treatment Recommendations: Strengthening;Balance training;Functional mobility training;Endurance training;Pain management;Safety education & training;Patient/Caregiver education & training;Equipment evaluation, education, & procurement;Positioning;Home management training;Coordination training    Restrictions  Restrictions/Precautions  Restrictions/Precautions: Fall Risk;General Precautions  Activity Level: Up as Tolerated;Up with Assist  Required Braces or Orthoses?: No    Subjective  Subjective  Subjective: Pt resting in bed prior to session. Pt requesting to walk this date and use the bathroom.  Pain: Pt denies pain, pt reporting not being able to feel her R leg at times.  Orientation  Overall Orientation Status: Within Normal Limits  Orientation Level: Oriented

## 2025-01-03 NOTE — PROGRESS NOTES
Physical Therapy  [facility]   Physical Therapy Treatment  Date: 1/3/25  Patient Name: Suzan Garcia       Room: 6-  MRN: [id number]  Account: [id number]   : 1965  (59 y.o.) Gender: female     Discharge Recommendations:  Further Physical Therapy is recommended upon facility discharge    PT Equipment Recommendations  Equipment Needed:  (TBD)    General  Chart Reviewed: Yes  Patient assessed for rehabilitation services?: Yes  Response To Previous Treatment: Patient with no complaints from previous session.  Family/Caregiver Present: No  Follows Commands: Within Functional Limits    Past Medical History:  has a past medical history of Chronic back pain.  Past Surgical History:   has a past surgical history that includes  section and IR BIOPSY LYMPH NODE SUPERFICIAL (2024).    Restrictions  Restrictions/Precautions  Restrictions/Precautions: Fall Risk, General Precautions  Activity Level: Up as Tolerated, Up with Assist  Required Braces or Orthoses?: No       Subjective  Subjective  Subjective: Pt lying in bed upon arrival, agreeable to therapy.   General  General Comments: co-treat with BIANCA Rice             Objective  Orientation  Overall Orientation Status: Within Normal Limits          Transfers  Transfers  Sit to Stand: Minimal Assistance  Stand to Sit: Minimal Assistance     Ambulation      Ambulation  Surface: Level tile  Device: Rolling Walker  Assistance: Minimal assistance, 2 Person assistance (Min A x2 progressing to Min A x1. After pts LOB, pt required min A x2 again)  Quality of Gait: impulsive, buckling of R LE  Gait Deviations: Decreased step length, Decreased step height, Staggers  Distance: 30ft, 8ft, 20ft  Comments: pt becomes fatigued easily and has R knee buckling. On final ambualtion trial, pt had LOB requiring Max A to correct.     Stairs  Stairs/Curb  Stairs?: No    Bed Mobility  Bed mobility  Supine to Sit: Stand by assistance  Sit to

## 2025-01-03 NOTE — CARE COORDINATION
Bedside RN to transport pt to [facility] at 1930. Facility informed. Bedside nurse informed.     Number for Report: [phone]  Electronically signed by DIETER Gandhi on 1/3/2025 at 3:33 PM

## 2025-01-03 NOTE — CARE COORDINATION
[facility] called, [phone], spoke with Desiree MEDINA, call reference number [id number] which is same at pending auth number.  Auth is pending, updated clinicals submitted via fax to [phone].  Update provided to Delisa MCCARTHY via Gayla Mena.      Received Telephone Call from payor [facility] representative Serjio. Call Ref#[id number]    Patient authorized for admission to Acute Inpatient Rehabilitation Stay under Auth/CaseRef# [id number]     Patient approved for 7 days for Acute Inpatient Rehabilitation level of care    Authorization valid for admission for the following timeframe: 1/3/25 to 1/8/2025    Next Review date: 1/9/2025   Continued stay clinical documentation to be submitted via: Fax: [phone]  Utilization Management : Raine , Phone: [phone]    Updated Delisa BLACKMON:  Perfect serve  at this time.

## 2025-01-03 NOTE — CARE COORDINATION
DISCHARGE PLANNING NOTE:    Writer is following for potential discharge to [facility]. Perfectserve message placed to RN NEYMAR Foster with ARU to check authorization status, pending at this time.    Writer met with pt for update, all questions answered at this time.  Electronically signed by DIETER Gandhi on 1/3/2025 at 11:58 AM    Message received from LOUIS Foster that pt authorization has been approved. Perfectserve message placed to bedside LOUIS Castro to verify discharge to ARU, pt is medically ready. ARU can accept this pt at 1930.   Electronically signed by DIETER Gandhi on 1/3/2025 at 3:32 PM

## 2025-01-03 NOTE — PLAN OF CARE
She is a 59 years old female presented with bilateral lower extremity weakness and left facial droop.  CT head and CTA head and neck showed no acute abnormalities.  MRI brain showed no acute intracranial abnormalities, except for some microvascular changes.  MRI was spine showed multiple levels spinal stenosis at multiple levels in the cervical, thoracic and lumbar spine.  LP was done that was positive for albuminocytologic dissociation, concern for GBS.  Received 5 days of IVIG.  Extremities improving.  Barium swallow study was done on 1/2/2025, normal.  Plan is to discharge to ARU today.  Insurance authorization pending.

## 2025-01-03 NOTE — PLAN OF CARE
Problem: Discharge Planning  Goal: Discharge to home or other facility with appropriate resources  1/3/2025 1034 by Gloria Alex RN  Outcome: Progressing     Problem: Safety - Adult  Goal: Free from fall injury  1/3/2025 1034 by Gloria Alex RN  Outcome: Progressing     Problem: Pain  Goal: Verbalizes/displays adequate comfort level or baseline comfort level  1/3/2025 1034 by Gloria Alex RN  Outcome: Progressing     Problem: Chronic Conditions and Co-morbidities  Goal: Patient's chronic conditions and co-morbidity symptoms are monitored and maintained or improved  1/3/2025 1034 by Gloria Alex RN  Outcome: Progressing     Problem: Skin/Tissue Integrity  Goal: Absence of new skin breakdown  Description: 1.  Monitor for areas of redness and/or skin breakdown  2.  Assess vascular access sites hourly  3.  Every 4-6 hours minimum:  Change oxygen saturation probe site  4.  Every 4-6 hours:  If on nasal continuous positive airway pressure, respiratory therapy assess nares and determine need for appliance change or resting period.  1/3/2025 1034 by Gloria Alex RN  Outcome: Progressing     Problem: ABCDS Injury Assessment  Goal: Absence of physical injury  1/3/2025 1034 by Gloria Alex RN  Outcome: Progressing     Problem: Neurosensory - Adult  Goal: Achieves stable or improved neurological status  1/3/2025 1034 by Gloria Alex RN  Outcome: Progressing

## 2025-01-04 LAB
ALBUMIN SERPL-MCNC: 3.7 G/DL (ref 3.5–5.2)
ALP SERPL-CCNC: 75 U/L (ref 35–104)
ALT SERPL-CCNC: 47 U/L (ref 10–35)
ANION GAP SERPL CALCULATED.3IONS-SCNC: 6 MMOL/L (ref 9–16)
AST SERPL-CCNC: 41 U/L (ref 10–35)
BASOPHILS # BLD: 0.16 K/UL (ref 0–0.2)
BASOPHILS NFR BLD: 2 % (ref 0–2)
BILIRUB DIRECT SERPL-MCNC: 0.3 MG/DL (ref 0–0.3)
BILIRUB INDIRECT SERPL-MCNC: 0.6 MG/DL (ref 0–1)
BILIRUB SERPL-MCNC: 0.9 MG/DL (ref 0–1.2)
BUN SERPL-MCNC: 17 MG/DL (ref 6–20)
CALCIUM SERPL-MCNC: 9.2 MG/DL (ref 8.6–10.4)
CHLORIDE SERPL-SCNC: 103 MMOL/L (ref 98–107)
CO2 SERPL-SCNC: 27 MMOL/L (ref 20–31)
CREAT SERPL-MCNC: 0.6 MG/DL (ref 0.7–1.2)
EOSINOPHIL # BLD: 0.98 K/UL (ref 0–0.4)
EOSINOPHILS RELATIVE PERCENT: 12 % (ref 0–4)
ERYTHROCYTE [DISTWIDTH] IN BLOOD BY AUTOMATED COUNT: 14.4 % (ref 11.5–14.9)
GFR, ESTIMATED: >90 ML/MIN/1.73M2
GLUCOSE BLD-MCNC: 231 MG/DL (ref 65–105)
GLUCOSE BLD-MCNC: 92 MG/DL (ref 65–105)
GLUCOSE SERPL-MCNC: 115 MG/DL (ref 74–99)
HCT VFR BLD AUTO: 34.4 % (ref 36–46)
HGB BLD-MCNC: 11.6 G/DL (ref 12–16)
LYMPHOCYTES NFR BLD: 1.31 K/UL (ref 1–4.8)
LYMPHOCYTES RELATIVE PERCENT: 16 % (ref 24–44)
MCH RBC QN AUTO: 27.8 PG (ref 26–34)
MCHC RBC AUTO-ENTMCNC: 33.6 G/DL (ref 31–37)
MCV RBC AUTO: 82.7 FL (ref 80–100)
MONOCYTES NFR BLD: 0.49 K/UL (ref 0.1–1.3)
MONOCYTES NFR BLD: 6 % (ref 1–7)
MORPHOLOGY: NORMAL
NEUTROPHILS NFR BLD: 64 % (ref 36–66)
NEUTS SEG NFR BLD: 5.26 K/UL (ref 1.3–9.1)
PLATELET # BLD AUTO: 295 K/UL (ref 150–450)
PMV BLD AUTO: 7.5 FL (ref 6–12)
POTASSIUM SERPL-SCNC: 4.1 MMOL/L (ref 3.7–5.3)
PROT SERPL-MCNC: 8.5 G/DL (ref 6.6–8.7)
RBC # BLD AUTO: 4.16 M/UL (ref 4–5.2)
SODIUM SERPL-SCNC: 136 MMOL/L (ref 136–145)
WBC OTHER # BLD: 8.2 K/UL (ref 3.5–11)

## 2025-01-04 PROCEDURE — 6370000000 HC RX 637 (ALT 250 FOR IP)

## 2025-01-04 PROCEDURE — 99222 1ST HOSP IP/OBS MODERATE 55: CPT | Performed by: INTERNAL MEDICINE

## 2025-01-04 PROCEDURE — 80048 BASIC METABOLIC PNL TOTAL CA: CPT

## 2025-01-04 PROCEDURE — 97165 OT EVAL LOW COMPLEX 30 MIN: CPT

## 2025-01-04 PROCEDURE — 97530 THERAPEUTIC ACTIVITIES: CPT

## 2025-01-04 PROCEDURE — 6360000002 HC RX W HCPCS

## 2025-01-04 PROCEDURE — 1180000000 HC REHAB R&B

## 2025-01-04 PROCEDURE — 80076 HEPATIC FUNCTION PANEL: CPT

## 2025-01-04 PROCEDURE — 92523 SPEECH SOUND LANG COMPREHEN: CPT

## 2025-01-04 PROCEDURE — 97162 PT EVAL MOD COMPLEX 30 MIN: CPT

## 2025-01-04 PROCEDURE — 97110 THERAPEUTIC EXERCISES: CPT

## 2025-01-04 PROCEDURE — 85025 COMPLETE CBC W/AUTO DIFF WBC: CPT

## 2025-01-04 PROCEDURE — 82947 ASSAY GLUCOSE BLOOD QUANT: CPT

## 2025-01-04 PROCEDURE — 99223 1ST HOSP IP/OBS HIGH 75: CPT | Performed by: GENERAL PRACTICE

## 2025-01-04 PROCEDURE — 97116 GAIT TRAINING THERAPY: CPT

## 2025-01-04 PROCEDURE — 6370000000 HC RX 637 (ALT 250 FOR IP): Performed by: GENERAL PRACTICE

## 2025-01-04 PROCEDURE — 97535 SELF CARE MNGMENT TRAINING: CPT

## 2025-01-04 PROCEDURE — 92610 EVALUATE SWALLOWING FUNCTION: CPT

## 2025-01-04 PROCEDURE — 36415 COLL VENOUS BLD VENIPUNCTURE: CPT

## 2025-01-04 RX ORDER — GABAPENTIN 300 MG/1
300 CAPSULE ORAL 3 TIMES DAILY
Status: DISCONTINUED | OUTPATIENT
Start: 2025-01-04 | End: 2025-01-06

## 2025-01-04 RX ORDER — ACETAMINOPHEN 325 MG/1
650 TABLET ORAL EVERY 8 HOURS
Status: DISCONTINUED | OUTPATIENT
Start: 2025-01-04 | End: 2025-01-16 | Stop reason: HOSPADM

## 2025-01-04 RX ADMIN — CARBOXYMETHYLCELLULOSE SODIUM 1 DROP: 5 SOLUTION/ DROPS OPHTHALMIC at 17:10

## 2025-01-04 RX ADMIN — CARBOXYMETHYLCELLULOSE SODIUM 1 DROP: 5 SOLUTION/ DROPS OPHTHALMIC at 20:41

## 2025-01-04 RX ADMIN — ENOXAPARIN SODIUM 40 MG: 100 INJECTION SUBCUTANEOUS at 08:10

## 2025-01-04 RX ADMIN — Medication 6 MG: at 20:43

## 2025-01-04 RX ADMIN — CARBOXYMETHYLCELLULOSE SODIUM 1 DROP: 5 SOLUTION/ DROPS OPHTHALMIC at 08:13

## 2025-01-04 RX ADMIN — KETOROLAC TROMETHAMINE 15 MG: 30 INJECTION, SOLUTION INTRAMUSCULAR; INTRAVENOUS at 01:27

## 2025-01-04 RX ADMIN — ACETAMINOPHEN 650 MG: 325 TABLET ORAL at 13:32

## 2025-01-04 RX ADMIN — GABAPENTIN 300 MG: 300 CAPSULE ORAL at 20:42

## 2025-01-04 RX ADMIN — ACETAMINOPHEN 650 MG: 325 TABLET ORAL at 20:44

## 2025-01-04 RX ADMIN — TIZANIDINE 4 MG: 4 TABLET ORAL at 06:26

## 2025-01-04 RX ADMIN — GABAPENTIN 300 MG: 300 CAPSULE ORAL at 13:32

## 2025-01-04 RX ADMIN — Medication 3 MG: at 01:32

## 2025-01-04 RX ADMIN — CARBOXYMETHYLCELLULOSE SODIUM 1 DROP: 5 SOLUTION/ DROPS OPHTHALMIC at 14:31

## 2025-01-04 RX ADMIN — TIZANIDINE 4 MG: 4 TABLET ORAL at 20:42

## 2025-01-04 RX ADMIN — AMLODIPINE BESYLATE 2.5 MG: 2.5 TABLET ORAL at 08:11

## 2025-01-04 RX ADMIN — KETOROLAC TROMETHAMINE 15 MG: 30 INJECTION, SOLUTION INTRAMUSCULAR; INTRAVENOUS at 08:10

## 2025-01-04 RX ADMIN — ROSUVASTATIN 40 MG: 40 TABLET, FILM COATED ORAL at 20:42

## 2025-01-04 ASSESSMENT — PAIN SCALES - GENERAL
PAINLEVEL_OUTOF10: 5
PAINLEVEL_OUTOF10: 3
PAINLEVEL_OUTOF10: 2
PAINLEVEL_OUTOF10: 0
PAINLEVEL_OUTOF10: 6
PAINLEVEL_OUTOF10: 5
PAINLEVEL_OUTOF10: 6

## 2025-01-04 ASSESSMENT — PAIN DESCRIPTION - DESCRIPTORS
DESCRIPTORS: ACHING;DISCOMFORT
DESCRIPTORS: ACHING

## 2025-01-04 ASSESSMENT — PAIN - FUNCTIONAL ASSESSMENT
PAIN_FUNCTIONAL_ASSESSMENT: ACTIVITIES ARE NOT PREVENTED

## 2025-01-04 ASSESSMENT — PAIN DESCRIPTION - ORIENTATION
ORIENTATION: MID
ORIENTATION: LEFT;MID
ORIENTATION: MID
ORIENTATION: MID;PROXIMAL

## 2025-01-04 ASSESSMENT — PAIN DESCRIPTION - LOCATION
LOCATION: BACK
LOCATION: NECK
LOCATION: BACK
LOCATION: BACK

## 2025-01-04 ASSESSMENT — 9 HOLE PEG TEST
TESTTIME_SECONDS: 32
TESTTIME_SECONDS: 39

## 2025-01-04 NOTE — PROGRESS NOTES
ACUTE INPATIENT REHABILITATION ADMISSION  [facility]    Patient Name: Suzan Garcia  MRN: [id number]   Ethnicity: Another , , or Japanese origin  Race: None of the Above    Date of Admit: 1/3/2025  Time of Arrival: 1900    Patient admitted to the Acute Inpatient Rehabilitation Unit via Wheelchair    Patient oriented to room, unit and fall prevention safety measures.  Education provided on the rehabilitation routine and therapy schedules.    Drug / Medication Regimen Review   Admitting medication orders compared with acute stay medications; home medication list reviewed with patient/family.      Medication Issues Identified ? No Medication Issues Identified     High-Risk Drug Classes: Use and Indication   Check if the patient is taking any medications by pharmacological classification     Anticoagulant Medication Ordered: Indication for Use -   and Opioid Medication Ordered: Indication for Use - Pain Management     *If no appropriate indication for use noted, follow up with provider for order clarification     Attending Physical Medicine & Rehabilitation (PM&R) Admitting Order Review   Admission orders reviewed with Acute Inpatient Rehabilitation Attending PM&R Physician: Admission orders reviewed with Acute Inpatient Rehabilitation Attending PM&R Physician: Dr. Jose M Montoya     Skin Assessment   Skin assessment performed by two nurses: all active alterations in skin integrity, wounds, lines, drains and airways assessed, measured, recorded and reconciled. Refer to LDA avatar and LDA flowsheet for additional information.    Nurse 1 Completing Skin Assessment CS   Nurse 2 Completing Skin Assessment LR     Wound care consulted for pressure injuries or complex wounds identified?  Not Applicable: No pressure injuries or complex wounds identified     Admission Weight   Patient's Weight Upon Admission Documented in Flowsheet?  Yes       Bowel and Bladder Functional Assessment   Prior History

## 2025-01-04 NOTE — PROGRESS NOTES
SPEECH LANGUAGE PATHOLOGY  Facility/Department: [facility] ACUTE REHAB   CLINICAL BEDSIDE SWALLOW EVALUATION    NAME: Suzan Garcia  : 1965  MRN: [id number]    ADMISSION DATE: 1/3/2025  ADMITTING DIAGNOSIS: has ASCUS with positive high risk HPV cervical; Screening mammogram for breast cancer; Sleep disturbance; Cholelithiasis without cholangitis; Biliary colic; Cervical lymphadenopathy; Lymphadenopathy, generalized; Thrombocytopenia (HCC); Leg weakness, bilateral; Bell's palsy; Guillain Barré syndrome (HCC); and Paraparesis (HCC) on their problem list.    Recent Chest Xray/CT of Chest: N/A    Date of Eval: 2025  Evaluating Therapist: GIANNA Chen    Current Diet level:  Current Diet : Regular  Current Liquid Diet : Thin    Primary Complaint  Suzan Garcia  is a 59 y.o. right-handed female admitted to the [place] [facility] unit on 1/3/2025.  She was originally admitted to [place] on 2024 for bilateral lower extremity weakness and left facial droop.       She initially presented with left facial droop and bilateral lower limb weakness for 1 day.  She had been recently admitted to [place] with cholelithiasis and thoracic and abdominal lymphadenopathy.  Upon the current presentation, MRI brain showed no acute intracranial abnormality.  MRI of the spine showed no acute abnormalities.  Lumbar puncture was done on 24.  She is currently receiving a 5-day course of IVIG for Guillain-Dodge syndrome (last dose 25).  Patient also had complaints of abdominal pain, CT abdomen concerning for cholelithiasis, no cholecystitis.  Oncology was consulted for abdominal lymphadenopathy recommending biopsy, patient underwent IR biopsy complicated by postop bleed.  Plan for outpatient follow-up.     Patient seen and examined working with therapy.  Interview with patient conducted with .  Patient expressed concern about pain medication as she was only discharged with IV

## 2025-01-04 NOTE — H&P
Physical Medicine & Rehabilitation History and Physical  [facility] Acute Rehabilitation Unit     Primary care provider: No primary care provider on file.     Chief Complaint and Reason for Rehabilitation Admission:   ADL and Mobility deficits secondary to Guillain-Barré syndrome with paraparesis.    History of Present Illness:  Suzan Garcia  is a 59 y.o. right-handed female admitted to the [place] Acute [facility] unit on 1/3/2025.  She was originally admitted to [place] on 12/26/2024 for bilateral lower extremity weakness and left facial droop.      She initially presented with left facial droop and bilateral lower limb weakness for 1 day.  She had been recently admitted to [place] with cholelithiasis and thoracic and abdominal lymphadenopathy.  Upon the current presentation, MRI brain showed no acute intracranial abnormality.  MRI of the spine showed no acute abnormalities.  Lumbar puncture was done on 12/27/24.  She is currently receiving a 5-day course of IVIG for Guillain-[place] syndrome (last dose 1/1/25).  Patient also had complaints of abdominal pain, CT abdomen concerning for cholelithiasis, no cholecystitis.  Oncology was consulted for abdominal lymphadenopathy recommending biopsy, patient underwent IR biopsy complicated by postop bleed.  Plan for outpatient follow-up.    Patient seen and examined working with therapy.  Interview with patient conducted with .  Patient expressed concern about pain medication as she was only discharged with IV Toradol x 2 doses.  Discussed with patient that routine IV pain medications are not typically administered in rehab and agreeable to p.o. medication.     She is currently requiring assistance for self-care activities and mobility prompting this admission.    Review of Systems:  Conducted through use of translating service ( ID: #[id number];  Name: Davie):  CONSTITUTIONAL: Negative fevers  EYES:

## 2025-01-04 NOTE — PROGRESS NOTES
Physical Therapy  Facility/Department: [facility] ACUTE REHAB  Rehabilitation Physical Therapy Initial Assessment    NAME: Suzan Garcia  : 1965 (59 y.o.)  MRN: [id number]  CODE STATUS: Full Code    Date of Service: 25      Past Medical History:   Diagnosis Date    Chronic back pain      Past Surgical History:   Procedure Laterality Date     SECTION      IR BIOPSY LYMPH NODE SUPERFICIAL  2024    IR BIOPSY LYMPH NODE SUPERFICIAL 2024 [facility] SPECIAL PROCEDURES       Patient assessed for rehabilitation services?: Yes  Family/Caregiver Present: Yes (Spouse)  Referring Practitioner: Dr Montoya  Referral Date : 25  Diagnosis: Paraperesis  Other (Comment): Frisian interpretor used through out the session.    Restrictions:        SUBJECTIVE: pleasant and cooperative.            25 1050   Pain   Pre-Pain 5   Post-Pain 5   Pain Location Right;Left;Head   Pain Descriptor Aching   Pain Interventions   (Dr Montoya discussed with pt regarding pain meds.)     Post Treatment Pain Screening       Prior Level of Function:  Social/Functional History  Lives With: Spouse, Daughter  Home Layout: Two level, Bed/Bath upstairs (Bed/bath in basement, pt prefers to go to basement.)  Entrance Stairs - Number of Steps: 4 MORRO in back door, 3 steps from front door with B rails. . Pt has 7 steps to basement with right rail going down.  Bathroom Shower/Tub: Walk-in shower (Pt uses plastic chair in the shower)  Bathroom Toilet: Standard  Bathroom Accessibility: Accessible  Home Equipment: Walker - Rolling (Rolling walker gifted by someone)  Mode of Transportation: SUV, Truck  IADL Comments: Sleeps in a flat bed at home  Additional Comments: Spouse for for construction company,, does not work this time of the year due to weather, he will be available to assist as needed at home. Daughter works part time.      OBJECTIVE  Vision  Vision: Impaired  Vision Exceptions: Wears glasses for

## 2025-01-04 NOTE — CONSULTS
[facility]   IN-PATIENT SERVICE   [facility]    Consult note            Date:   2025  Patient name:  Suzan Garcia  Date of admission:  1/3/2025  7:28 PM  MRN:   [id number]  Account:  [id number]  YOB: 1965  PCP:    No primary care provider on file.  Room:   [address] [place] [address]  Code Status:    Full Code    Chief Complaint:     No chief complaint on file.      History Obtained From:     patient    History of Present Illness:     The patient is a 59 y.o.   /  female who presents with No chief complaint on file.   and she is admitted to the hospital for the management of    Patient is 59-year-old female who initially came to the hospital with left-sided facial droop and bilateral lower extremity weakness, seen by neurology, head CT CTA was negative,MRI brain showed no acute intracranial abnormalities, except for some microvascular changes.MRI cervical spine showed mild spinal canal stenosis at C6-C7 and T1-T2. MRI thoracic spine showed mild spinal stenosis at T8-T9 secondary to posterior disc protrusion. MRI lumbar spine showed central disc protrusion at L4-L5, moderately narrows the spinal canal. Posterior disc bulge at L3-L4 and mild spinal canal stenosis. Mild bilateral neural foraminal narrowing at L3-L4 and L4-L5.  LP consistent with albumin cytologic dissociation, seen by neurology, started on IVIG, completed 5  Currently admitted at [facility] for further      Past Medical History:     Past Medical History:   Diagnosis Date    Chronic back pain         Past Surgical History:     Past Surgical History:   Procedure Laterality Date     SECTION      IR BIOPSY LYMPH NODE SUPERFICIAL  2024    IR BIOPSY LYMPH NODE SUPERFICIAL 2024 [facility] SPECIAL PROCEDURES        Medications Prior to Admission:     Prior to Admission medications    Medication Sig Start Date End Date Taking? Authorizing Provider   gabapentin (NEURONTIN) 300 MG    HEMATOLOGIC/LYMPHATIC:  negative for swelling/edema   ALLERGIC/IMMUNOLOGIC:  negative for urticaria , itching  ENDOCRINE:  negative increase in drinking, increase in urination, hot or cold intolerance  MUSCULOSKELETAL:  negative joint pains, muscle aches, swelling of joints  NEUROLOGICAL: Refer weakness  BEHAVIOR/PSYCH:  negative for depression, anxiety    Physical Exam:   /72   Pulse 58   Temp 98.3 °F (36.8 °C) (Oral)   Resp 16   Ht 1.54 m (5' 0.63\")   Wt 59.7 kg (131 lb 9.8 oz)   LMP 10/13/2015 (Approximate) Comment: possible menopause  SpO2 98%   BMI 25.17 kg/m²   Temp (24hrs), Av.6 °F (37 °C), Min:98.2 °F (36.8 °C), Max:99.3 °F (37.4 °C)    Recent Labs     25  1119   POCGLU 231*     No intake or output data in the 24 hours ending 25 1425    General Appearance:  alert, well appearing, and in no acute distress  Mental status: oriented to person, place, and time with normal affect  Head:  normocephalic, atraumatic.  Eye: no icterus, redness, pupils equal and reactive, extraocular eye movements intact, conjunctiva clear  Ear: normal external ear, no discharge, hearing intact  Nose:  no drainage noted  Mouth: mucous membranes moist  Neck: supple, no carotid bruits, thyroid not palpable  Lungs: Bilateral equal air entry, clear to ausculation, no wheezing, rales or rhonchi, normal effort  Cardiovascular: normal rate, regular rhythm, no murmur, gallop, rub.  Abdomen: Soft, nontender, nondistended, normal bowel sounds, no hepatomegaly or splenomegaly  Neurologic: Strength right lower extremity 2/5, right upper extremity 5/5, left upper extremity 4/5 skin: No gross lesions, rashes, bruising or bleeding on exposed skin area  Extremities:  peripheral pulses palpable, no pedal edema or calf pain with palpation      Investigations:      Laboratory Testing:  Recent Results (from the past 24 hour(s))   Basic Metabolic Panel w/ Reflex to MG    Collection Time: 25  7:08 AM   Result Value Ref  Range    Sodium 136 136 - 145 mmol/L    Potassium 4.1 3.7 - 5.3 mmol/L    Chloride 103 98 - 107 mmol/L    CO2 27 20 - 31 mmol/L    Anion Gap 6 (L) 9 - 16 mmol/L    Glucose 115 (H) 74 - 99 mg/dL    BUN 17 6 - 20 mg/dL    Creatinine 0.6 (L) 0.7 - 1.2 mg/dL    Est, Glom Filt Rate >90 >60 mL/min/1.73m2    Calcium 9.2 8.6 - 10.4 mg/dL   Hepatic Function Panel    Collection Time: 01/04/25  7:08 AM   Result Value Ref Range    Albumin 3.7 3.5 - 5.2 g/dL    Alkaline Phosphatase 75 35 - 104 U/L    ALT 47 (H) 10 - 35 U/L    AST 41 (H) 10 - 35 U/L    Total Bilirubin 0.9 0.0 - 1.2 mg/dL    Bilirubin, Direct 0.3 0.0 - 0.3 mg/dL    Bilirubin, Indirect 0.6 0.0 - 1.0 mg/dL    Total Protein 8.5 6.6 - 8.7 g/dL   CBC auto differential    Collection Time: 01/04/25  7:08 AM   Result Value Ref Range    WBC 8.2 3.5 - 11.0 k/uL    RBC 4.16 4.0 - 5.2 m/uL    Hemoglobin 11.6 (L) 12.0 - 16.0 g/dL    Hematocrit 34.4 (L) 36 - 46 %    MCV 82.7 80 - 100 fL    MCH 27.8 26 - 34 pg    MCHC 33.6 31 - 37 g/dL    RDW 14.4 11.5 - 14.9 %    Platelets 295 150 - 450 k/uL    MPV 7.5 6.0 - 12.0 fL    Neutrophils % 64 36 - 66 %    Lymphocytes % 16 (L) 24 - 44 %    Monocytes % 6 1 - 7 %    Eosinophils % 12 (H) 0 - 4 %    Basophils % 2 0 - 2 %    Neutrophils Absolute 5.26 1.3 - 9.1 k/uL    Lymphocytes Absolute 1.31 1.0 - 4.8 k/uL    Monocytes Absolute 0.49 0.1 - 1.3 k/uL    Eosinophils Absolute 0.98 (H) 0.0 - 0.4 k/uL    Basophils Absolute 0.16 0.0 - 0.2 k/uL    Morphology Normal    POC Glucose Fingerstick    Collection Time: 01/04/25 11:19 AM   Result Value Ref Range    POC Glucose 231 (H) 65 - 105 mg/dL       Imaging/Diagnostics:        Assessment :      Primary Problem  Paraparesis (HCC)    Active Hospital Problems    Diagnosis Date Noted    Paraparesis ([facility]) [G82.20] 01/03/2025       Plan:     Bilateral lower extremity weakness right much more than left and facial droop secondary to Guillain-Barré patient completed IVIG, continues to have

## 2025-01-04 NOTE — PLAN OF CARE
Problem: Discharge Planning  Goal: Discharge to home or other facility with appropriate resources  1/4/2025 1102 by Amada Trinidad RN  Outcome: Progressing  1/3/2025 2121 by Neno Lagos RN  Outcome: Progressing     Problem: Safety - Adult  Goal: Free from fall injury  1/4/2025 1102 by Amada Trinidad RN  Outcome: Progressing  1/3/2025 2121 by Neno Lagos RN  Outcome: Progressing     Problem: Skin/Tissue Integrity  Goal: Absence of new skin breakdown  Description: 1.  Monitor for areas of redness and/or skin breakdown  2.  Assess vascular access sites hourly  3.  Every 4-6 hours minimum:  Change oxygen saturation probe site  4.  Every 4-6 hours:  If on nasal continuous positive airway pressure, respiratory therapy assess nares and determine need for appliance change or resting period.  1/4/2025 1102 by Amada Trinidad RN  Outcome: Progressing  1/3/2025 2121 by Neno Lagos RN  Outcome: Progressing     Problem: Pain  Goal: Verbalizes/displays adequate comfort level or baseline comfort level  Outcome: Progressing

## 2025-01-04 NOTE — PROGRESS NOTES
SPEECH LANGUAGE PATHOLOGY  Facility/Department: [facility] ACUTE REHAB  Initial Speech/Language/Cognitive Assessment    NAME: Suzan Garcia  : 1965   MRN: [id number]  ADMISSION DATE: 1/3/2025  ADMITTING DIAGNOSIS: has ASCUS with positive high risk HPV cervical; Screening mammogram for breast cancer; Sleep disturbance; Cholelithiasis without cholangitis; Biliary colic; Cervical lymphadenopathy; Lymphadenopathy, generalized; Thrombocytopenia (HCC); Leg weakness, bilateral; Bell's palsy; Guillain Barré syndrome (HCC); and Paraparesis (HCC) on their problem list.    Date of Eval: 2025   Evaluating Therapist: GIANNA Chen    RECENT RESULTS MRI: 2024  IMPRESSION:  1.  No acute intracranial abnormality.  2. Mild chronic white matter microvascular ischemic changes.    Primary Complaint:   Suzan Garcia  is a 59 y.o. right-handed female admitted to the [place] [facility] on 1/3/2025.  She was originally admitted to [place] on 2024 for bilateral lower extremity weakness and left facial droop.       She initially presented with left facial droop and bilateral lower limb weakness for 1 day.  She had been recently admitted to [place] with cholelithiasis and thoracic and abdominal lymphadenopathy.  Upon the current presentation, MRI brain showed no acute intracranial abnormality.  MRI of the spine showed no acute abnormalities.  Lumbar puncture was done on 24.  She is currently receiving a 5-day course of IVIG for Guillain-Murdock syndrome (last dose 25).  Patient also had complaints of abdominal pain, CT abdomen concerning for cholelithiasis, no cholecystitis.  Oncology was consulted for abdominal lymphadenopathy recommending biopsy, patient underwent IR biopsy complicated by postop bleed.  Plan for outpatient follow-up.     Patient seen and examined working with therapy.  Interview with patient conducted with .  Patient expressed concern about pain

## 2025-01-04 NOTE — PROGRESS NOTES
[facility]   Acute Rehabilitation Occupational Therapy Evaluation     Date: 25  Patient Name: Suzan Garcia       Room: 2641/2641-01  MRN: [id number]  Account: [id number]   : 1965  (59 y.o.) Gender: female          Diagnosis: Guillain Warsaw Syndrome with paraparesis , cranial nerve VII palsy, Dysphagia  Additional Pertinent Hx: Suzan Garcia is a 59 y.o. right-handed female with history of HTN admitted to [facility] on 2024.       She initially presented with left facial droop and bilateral lower limb weakness for 1 day.  She had been recently admitted to [place] with cholelithiasis and thoracic and abdominal lymphadenopathy.  Upon the current presentation, MRI brain showed no acute intracranial abnormality.  MRI of the spine showed no acute abnormalities.  Lumbar puncture was done on 24.  She is currently receiving a 5-day course of IVIG for Guillain-Warsaw syndrome (last dose 25).     She reports ongoing bilateral lower limb weakness, right greater than left.  She also notes numbness/tingling in the bilateral lower limbs.  She states that she continues to have some weakness in the left side of her face along with speech difficulty.  She reports blurred vision as well.  per neuro MD 1-3: Bilateral, R>L, leg weakness and left CN VII palsy. In a patient with CSF albuminocytologic dissociation, consistent with Guillain-Barré syndrome.  Somewhat atypical given asymmetric findings, although similar cases have previously been reported in literature. GM AB panel negative. Symptoms improved with IVIG.    Treatment Diagnosis: Impaired self-care status due to GB with BLE weakness affecting mobility.    Past Medical History:  has a past medical history of Chronic back pain.    Past Surgical History:   has a past surgical history that includes  section and IR BIOPSY LYMPH NODE SUPERFICIAL (2024).    Restrictions     Restrictions/Precautions: Fall  adls.    Plan  Occupational Therapy Plan  Times Per Week: 5-7  Times Per Day: Twice a day  Current Treatment Recommendations: Balance training, Functional mobility training, Endurance training, Safety education & training, Patient/Caregiver education & training, Equipment evaluation, education, & procurement, Home management training, Self-Care / ADL, Group Therapy      OT Individual Minutes  OT Individual Minutes  Time In: 0841  Time Out: 1023  Minutes: 102            Electronically signed by Za Mckeon OT on 1/4/25 at 5:49 PM EST

## 2025-01-04 NOTE — PROGRESS NOTES
Physical Therapy  Facility/Department: [facility] ACUTE REHAB  Rehabilitation Physical Therapy Daily Treatment Note    NAME: Suzan Garcia  : 1965 (59 y.o.)  MRN: [id number]  CODE STATUS: Full Code    Date of Service: 25      Past Medical History:   Diagnosis Date    Chronic back pain      Past Surgical History:   Procedure Laterality Date     SECTION      IR BIOPSY LYMPH NODE SUPERFICIAL  2024    IR BIOPSY LYMPH NODE SUPERFICIAL 2024 [facility] SPECIAL PROCEDURES       Patient assessed for rehabilitation services?: Yes  Family/Caregiver Present: No  Referring Practitioner: Dr Montoya  Referral Date : 25  Diagnosis: Paraperesis  Other (Comment): Kazakh interpretor used through out the session.    Restrictions:  Restrictions/Precautions: Fall Risk;General Precautions     SUBJECTIVE  Subjective: Pt resting in bed on writers arrival. Pt is pleasant and cooperative with therapy. Agreeable to use of interpretation services  Pain: Declines pain this PM      OBJECTIVE  Vision  Vision: Impaired  Vision Exceptions: Wears glasses for distance    Hearing  Hearing: Within functional limits    Cognition  Overall Cognitive Status: WFL       Functional Mobility  Bed mobility  Supine to Sit: Minimal assistance  Sit to Supine: Minimal assistance  Scooting: Stand by assistance (to EOB, edge of chair)  Bed Mobility Comments: HOB flat, use of bed rails  Transfers  Sit to Stand: Minimal Assistance  Stand to Sit: Minimal Assistance  Bed to Chair: Minimal assistance (RW, SPT)  Stand Pivot Transfers: Minimal Assistance (both with and without device. Cues to reach for bed rail to pivot)  Comment: Transfers completed with RW unless noted otherwise. Requires safety cues for hand placement  Balance  Posture: Good  Sitting - Static: Good  Sitting - Dynamic: Fair;+  Standing - Static: Fair  Standing - Dynamic: Fair;-  Comments: Standing with RW    Environmental Mobility  Ambulation  Surface: Level tile  Device:  Rolling Walker  Other Apparatus: Wheelchair follow  Assistance: Minimal assistance  Quality of Gait: Varied step length, narrow HORTENSIA, increased BUE reliance on RW  Gait Deviations: Slow No;Decreased step length;Decreased step height (NBOS)  Distance: 77ft and 81ft  Comments: Pt reports fatigue post ambulation, short seated rest break between trials  Stairs/Curb  Stairs?: No    PT Exercises  A/AROM Exercises: Seated BLE exercises x 15, 1# cuff LLE, OTB  Functional Mobility Circuit Training: SPT x 2. STS x 4. (1 SPT with RW and 1 without)    ASSESSMENT       Activity Tolerance  Activity Tolerance: Patient limited by fatigue;Patient limited by endurance    Assessment  Treatment Diagnosis: Paraperesis  Therapy Prognosis: Good  Decision Making: Medium Complexity  Discharge Recommendations: Patient would benefit from continued therapy after discharge;Therapy recommended at discharge;Home with assist PRN      GOALS  Patient Goals   Patient Goals : Get stronger, want to be able to do steps.  Short Term Goals  Time Frame for Short Term Goals: 1 week  Short Term Goal 1: mod-I bed mobility  Short Term Goal 2: CGA transfers from various surfaces  Short Term Goal 3: pt able to ambulate with RW  150ft , CGA on level surfaces  Short Term Goal 4: pt to ambulate on incline surface with RW, min A  Short Term Goal 5: pt able to perform 4 to 8 steps with B rails, miin A  Short Term Goal 6: pt to propel w/c , level surfaces, distance of 150 ft, CGA , including truns to improve strength and endurance to bring herelf to therapy gym.  Long Term Goals  Time Frame for Long Term Goals : By DC  Long Term Goal 1: Transfers mod-I from various surfaces  Long Term Goal 2: Car transfers SBA  Long Term Goal 3: Ambulation with RW/Rollator distance of 50 ft, mod-I , levle surfaces  Long Term Goal 4: Pt to ambulate distance of 30 ft x2, in incline surfaces, with RW/Rollaotr, CGA  Long Term Goal 5: Pt to ambulate distance of 150 ft (2MWT) with

## 2025-01-05 PROCEDURE — 1180000000 HC REHAB R&B

## 2025-01-05 PROCEDURE — 99232 SBSQ HOSP IP/OBS MODERATE 35: CPT | Performed by: GENERAL PRACTICE

## 2025-01-05 PROCEDURE — 97535 SELF CARE MNGMENT TRAINING: CPT

## 2025-01-05 PROCEDURE — 97530 THERAPEUTIC ACTIVITIES: CPT

## 2025-01-05 PROCEDURE — 97116 GAIT TRAINING THERAPY: CPT

## 2025-01-05 PROCEDURE — 6360000002 HC RX W HCPCS

## 2025-01-05 PROCEDURE — 97110 THERAPEUTIC EXERCISES: CPT

## 2025-01-05 PROCEDURE — 99232 SBSQ HOSP IP/OBS MODERATE 35: CPT | Performed by: INTERNAL MEDICINE

## 2025-01-05 PROCEDURE — 6370000000 HC RX 637 (ALT 250 FOR IP)

## 2025-01-05 PROCEDURE — 6370000000 HC RX 637 (ALT 250 FOR IP): Performed by: GENERAL PRACTICE

## 2025-01-05 RX ADMIN — CARBOXYMETHYLCELLULOSE SODIUM 1 DROP: 5 SOLUTION/ DROPS OPHTHALMIC at 07:44

## 2025-01-05 RX ADMIN — GABAPENTIN 300 MG: 300 CAPSULE ORAL at 14:26

## 2025-01-05 RX ADMIN — CARBOXYMETHYLCELLULOSE SODIUM 1 DROP: 5 SOLUTION/ DROPS OPHTHALMIC at 19:52

## 2025-01-05 RX ADMIN — ENOXAPARIN SODIUM 40 MG: 100 INJECTION SUBCUTANEOUS at 07:43

## 2025-01-05 RX ADMIN — CARBOXYMETHYLCELLULOSE SODIUM 1 DROP: 5 SOLUTION/ DROPS OPHTHALMIC at 14:27

## 2025-01-05 RX ADMIN — ACETAMINOPHEN 650 MG: 325 TABLET ORAL at 19:48

## 2025-01-05 RX ADMIN — GABAPENTIN 300 MG: 300 CAPSULE ORAL at 19:48

## 2025-01-05 RX ADMIN — GABAPENTIN 300 MG: 300 CAPSULE ORAL at 07:43

## 2025-01-05 RX ADMIN — ACETAMINOPHEN 650 MG: 325 TABLET ORAL at 06:04

## 2025-01-05 RX ADMIN — CARBOXYMETHYLCELLULOSE SODIUM 1 DROP: 5 SOLUTION/ DROPS OPHTHALMIC at 17:14

## 2025-01-05 RX ADMIN — Medication 6 MG: at 19:51

## 2025-01-05 RX ADMIN — ACETAMINOPHEN 650 MG: 325 TABLET ORAL at 14:26

## 2025-01-05 RX ADMIN — AMLODIPINE BESYLATE 2.5 MG: 2.5 TABLET ORAL at 07:43

## 2025-01-05 RX ADMIN — ROSUVASTATIN 40 MG: 40 TABLET, FILM COATED ORAL at 19:48

## 2025-01-05 ASSESSMENT — PAIN DESCRIPTION - ORIENTATION: ORIENTATION: MID

## 2025-01-05 ASSESSMENT — PAIN SCALES - GENERAL: PAINLEVEL_OUTOF10: 5

## 2025-01-05 ASSESSMENT — PAIN DESCRIPTION - DESCRIPTORS: DESCRIPTORS: ACHING

## 2025-01-05 ASSESSMENT — PAIN DESCRIPTION - LOCATION: LOCATION: NECK

## 2025-01-05 NOTE — PROGRESS NOTES
[facility]   IN-PATIENT SERVICE   [facility]    Consult note            Date:   2025  Patient name:  Suzan Garcia  Date of admission:  1/3/2025  7:28 PM  MRN:   [id number]  Account:  [id number]  YOB: 1965  PCP:    No primary care provider on file.  Room:   [address] [place] [address]  Code Status:    Full Code    Chief Complaint:     No chief complaint on file.      History Obtained From:     patient    History of Present Illness:     The patient is a 59 y.o.   /  female who presents with No chief complaint on file.   and she is admitted to the hospital for the management of    Patient is 59-year-old female who initially came to the hospital with left-sided facial droop and bilateral lower extremity weakness, seen by neurology, head CT CTA was negative,MRI brain showed no acute intracranial abnormalities, except for some microvascular changes.MRI cervical spine showed mild spinal canal stenosis at C6-C7 and T1-T2. MRI thoracic spine showed mild spinal stenosis at T8-T9 secondary to posterior disc protrusion. MRI lumbar spine showed central disc protrusion at L4-L5, moderately narrows the spinal canal. Posterior disc bulge at L3-L4 and mild spinal canal stenosis. Mild bilateral neural foraminal narrowing at L3-L4 and L4-L5.  LP consistent with albumin cytologic dissociation, seen by neurology, started on IVIG, completed 5  Currently admitted at [facility] for further   was used during encounter    Past Medical History:     Past Medical History:   Diagnosis Date    Chronic back pain         Past Surgical History:     Past Surgical History:   Procedure Laterality Date     SECTION      IR BIOPSY LYMPH NODE SUPERFICIAL  2024    IR BIOPSY LYMPH NODE SUPERFICIAL 2024 [facility] SPECIAL PROCEDURES        Medications Prior to Admission:     Prior to Admission medications    Medication Sig Start Date End Date Taking? Authorizing Provider    HEMATOLOGIC/LYMPHATIC:  negative for swelling/edema   ALLERGIC/IMMUNOLOGIC:  negative for urticaria , itching  ENDOCRINE:  negative increase in drinking, increase in urination, hot or cold intolerance  MUSCULOSKELETAL:  negative joint pains, muscle aches, swelling of joints  NEUROLOGICAL: Refer weakness  BEHAVIOR/PSYCH:  negative for depression, anxiety    Physical Exam:   BP (!) 111/90   Pulse 70   Temp 98.4 °F (36.9 °C) (Oral)   Resp 18   Ht 1.54 m (5' 0.63\")   Wt 59.7 kg (131 lb 9.8 oz)   LMP 10/13/2015 (Approximate) Comment: possible menopause  SpO2 96%   BMI 25.17 kg/m²   Temp (24hrs), Av.3 °F (36.8 °C), Min:98.1 °F (36.7 °C), Max:98.4 °F (36.9 °C)    Recent Labs     25  1119 25  1641   POCGLU 231* 92     No intake or output data in the 24 hours ending 25 1502    General Appearance:  alert, well appearing, and in no acute distress  Mental status: oriented to person, place, and time with normal affect  Head:  normocephalic, atraumatic.  Eye: no icterus, redness, pupils equal and reactive, extraocular eye movements intact, conjunctiva clear  Ear: normal external ear, no discharge, hearing intact  Nose:  no drainage noted  Mouth: mucous membranes moist  Neck: supple, no carotid bruits, thyroid not palpable  Lungs: Bilateral equal air entry, clear to ausculation, no wheezing, rales or rhonchi, normal effort  Cardiovascular: normal rate, regular rhythm, no murmur, gallop, rub.  Abdomen: Soft, nontender, nondistended, normal bowel sounds, no hepatomegaly or splenomegaly  Neurologic: Strength right lower extremity 2/5, right upper extremity 5/5, left upper extremity 4/5 skin: No gross lesions, rashes, bruising or bleeding on exposed skin area  Extremities:  peripheral pulses palpable, no pedal edema or calf pain with palpation      Investigations:      Laboratory Testing:  Recent Results (from the past 24 hour(s))   POC Glucose Fingerstick    Collection Time: 25  4:41 PM   Result

## 2025-01-05 NOTE — PROGRESS NOTES
Writer got notified that patient fell with therapy at the gym. Dr Montoya and lakesha devine got notified.

## 2025-01-05 NOTE — PROGRESS NOTES
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 right-handed female with history of HTN admitted to [facility] on 2024.       She initially presented with left facial droop and bilateral lower limb weakness for 1 day.  She had been recently admitted to [place] with cholelithiasis and thoracic and abdominal lymphadenopathy.  Upon the current presentation, MRI brain showed no acute intracranial abnormality.  MRI of the spine showed no acute abnormalities.  Lumbar puncture was done on 24.  She is currently receiving a 5-day course of IVIG for Guillain-[place] syndrome (last dose 25).     She reports ongoing bilateral lower limb weakness, right greater than left.  She also notes numbness/tingling in the bilateral lower limbs.  She states that she continues to have some weakness in the left side of her face along with speech difficulty.  She reports blurred vision as well.  per neuro MD 1-3: Bilateral, R>L, leg weakness and left CN VII palsy. In a patient with CSF albuminocytologic dissociation, consistent with Guillain-Barré syndrome.  Somewhat atypical given asymmetric findings, although similar cases have previously been reported in literature. GM AB panel negative. Symptoms improved with IVIG.    Treatment Diagnosis: Impaired self-care status due to GB with BLE weakness affecting mobility.    Past Medical History:  has a past medical history of Chronic back pain.    Past Surgical History:   has a past surgical history that includes  section and IR BIOPSY LYMPH NODE SUPERFICIAL  Education  Education  Education Given To: Patient;Family;Caregiver  Education Provided: Fall Prevention Strategies;Energy Conservation;Safety;ADL Function;Mobility Training;Transfer Training  Education Provided Comments: ed pt and spouse:  ed re new learning re BLE weakness- learning to read signs of fatigue and intiate sit and rest before legs give out.  ed that she will gradually tolerate more activity but will need to monitor this each day ongoing for months. ed re how being out of bed helps build work tolerance and put plan in place for pt to get out of bed later this pm.  ed pt to keep 1 hand on walker for support while completing pants manipulation with opposite UE and pt practiced.  Education Method: Verbal  Barriers to Learning: None  Education Outcome: Verbalized understanding         Family education  Initiated    Goals  Patient Goals   Patient goals : \"go home,  do things on my own, not depend on anybody.\"  Short Term Goals  Time Frame for Short Term Goals: 1 week  Short Term Goal 1: Pt will complete LB ADLs with supervision, Good safety, and use of AE/modified techniques as needed  Short Term Goal 2: Pt will complete functional transfers with SBA, Good safety, and least restrictive device  Short Term Goal 3: Pt will tolerate dynamic/static standing/ ambulating with least restrictive device and SBA for 7+ mins during therapeutic exercise/functional activity for improved activity tolerance  Short Term Goal 4: Pt will verbalize/demonstrate Good understanding of fall prevention/home safety modification techniques for improved safety during self-care  Short Term Goal 5: Pt will actively participate in 30+ mins of therapeutic exercise/functional activity for improved safety and independence with self-care  Additional Goals?: Yes  Short Term Goal 6: SBA amb to obtain set up for adls with good walker safety technique.  Short Term Goal 7: SBA with advanced adls with good walker safety technique.    Long Term  Goals  Time Frame for Long Term Goals : upon dc  Long Term Goal 1: mod indep amb to obtain set up for adls with good safe techniques using least restrictive device.  Long Term Goal 2: Pt will complete LB ADLs with CATHERINE, Good safety, and use of AE/modified techniques as needed  Long Term Goal 3: Pt will complete functional mobility/transfers with CATHERINE, Good safety, and least restrictive device  Long Term Goal 4: Pt will tolerate dynamic/static standing/ ambulating with least restrictive device and mod indep for12+ mins during therapeutic exercise/functional activity for improved activity tolerance  Long Term Goal 5: mod indep simple advanced adls with good balance and safety.  Additional Goals?: Yes  Long Term Goal 6: increase R  from 35 to 38 and L  from 28 to 32 to increase hand strength for adls.    Plan  Occupational Therapy Plan  Times Per Week: 5-7  Times Per Day: Twice a day  Current Treatment Recommendations: Balance training, Functional mobility training, Endurance training, Safety education & training, Patient/Caregiver education & training, Equipment evaluation, education, & procurement, Home management training, Self-Care / ADL, Group Therapy     01/05/25 1600    Services   Do you speak English? Not at all    Used Yes    Provider Service Nona [id number], Chris [id number] in am  (Denia [id number] in pm.)   Type of Resource Used Video Remote    Information Interpreted  Other (Comment)  (OT)         OT Individual Minutes  Am 9:03 to 10:11 am.   OT Individual Minutes  Time In: 1407  Time Out: 1440  Minutes: 33            Electronically signed by Za Mckeon OT on 1/5/25 at 5:02 PM EST

## 2025-01-05 NOTE — PROGRESS NOTES
Physical Therapy  Facility/Department: [facility] ACUTE REHAB  Rehabilitation Physical Therapy Daily Treatment Note    NAME: Suzan Garcia  : 1965 (59 y.o.)  MRN: [id number]  CODE STATUS: Full Code    Date of Service: 25      Past Medical History:   Diagnosis Date    Chronic back pain      Past Surgical History:   Procedure Laterality Date     SECTION      IR BIOPSY LYMPH NODE SUPERFICIAL  2024    IR BIOPSY LYMPH NODE SUPERFICIAL 2024 [facility] SPECIAL PROCEDURES       Patient assessed for rehabilitation services?: Yes  Family/Caregiver Present: No  Referring Practitioner: Dr Montoya  Referral Date : 25  Diagnosis: Paraperesis  Other (Comment): Armenian interpretor used through out the session.  General  General Comments: Pt had a fall during PM session while ambulating with writer. Pt was CGA for gait, demonstrating steady gait, good posture, good even step length with reciprocal pattern. Writer progressed pt to SBA during ambulation and short after, pt fell to her knees. Pt declined any injury, pain and wanted to continue with therapy session. Pt states her knees gave out and buckled when she fell. Pt was using RW, gait belt on, wheelchair follow from second assist. 3 Therapists lifted pt back to her chair and continued conservatively during session    Restrictions:  Restrictions/Precautions: Fall Risk;General Precautions     SUBJECTIVE  Subjective: Pt resting in bed both AM and PM session. Pleasant and cooperative with therapy      OBJECTIVE       Hearing  Hearing: Within functional limits    Cognition  Overall Cognitive Status: WFL         Functional Mobility  Bed mobility  Rolling to Right: Stand by assistance  Supine to Sit: Stand by assistance  Sit to Supine: Stand by assistance  Scooting: Stand by assistance  Bed Mobility Comments: HOB flat, use of bed rails  Transfers  Sit to Stand: Contact guard assistance (Cues for hand placement)  Stand to Sit: Contact guard assistance  steps with B rails, miin A  Short Term Goal 6: pt to propel w/c , level surfaces, distance of 150 ft, CGA , including truns to improve strength and endurance to bring herelf to therapy gym.  Long Term Goals  Time Frame for Long Term Goals : By DC  Long Term Goal 1: Transfers mod-I from various surfaces  Long Term Goal 2: Car transfers SBA  Long Term Goal 3: Ambulation with RW/Rollator distance of 50 ft, mod-I , levle surfaces  Long Term Goal 4: Pt to ambulate distance of 30 ft x2, in incline surfaces, with RW/Rollaotr, CGA  Long Term Goal 5: Pt to ambulate distance of 150 ft (2MWT) with RW/Rollaotr on level surfaces, SBA to improve independence and endurance.  Long term goal 6: Pt able to perform flight of steps with 1 UE support, SBA.  Long term goal 7: Pt to improve Tinetti balance score from 4/28 to 21/28  atleast to reduce fall risk.  Long term goal 8: Pt to propel w/c, distance of 150 ft, level surfaces , mod-I to improve strength an endurance.    PLAN OF CARE  Frequency: 1-2 treatment sessions per day, 5-7 days per week  Physical Therapy Plan  General Plan:  minutes of therapy at least 5 out of 7 days a week (1.5 hr/day, 5-7x per day)  Current Treatment Recommendations: Gait training;Stair training;Functional mobility training;Balance training;Transfer training;Therapeutic activities;Safety education & training;Endurance training  Safety Devices  Type of Devices: All fall risk precautions in place;Left in chair;Chair alarm in place;Call light within reach    EDUCATION  Education  Education Given To: Patient  Education Provided: Role of Therapy;Plan of Care;Safety;Fall Prevention Strategies;Transfer Training;Mobility Training  Education Method: Demonstration;Verbal  Barriers to Learning: None ( service used- pt and spouse)  Education Outcome: Verbalized understanding;Demonstrated understanding       01/05/25 1011 01/05/25 1304   PT Individual Minutes   Time In 1011 1304   Time Out [id number]

## 2025-01-05 NOTE — PLAN OF CARE
Problem: Discharge Planning  Goal: Discharge to home or other facility with appropriate resources  1/5/2025 0902 by Amada Trinidad RN  Outcome: Progressing  1/5/2025 0042 by Yesenia Herrera LPN  Outcome: Progressing  Flowsheets (Taken 1/4/2025 2030)  Discharge to home or other facility with appropriate resources:   Identify barriers to discharge with patient and caregiver   Arrange for needed discharge resources and transportation as appropriate   Identify discharge learning needs (meds, wound care, etc)   Arrange for interpreters to assist at discharge as needed   Refer to discharge planning if patient needs post-hospital services based on physician order or complex needs related to functional status, cognitive ability or social support system     Problem: Safety - Adult  Goal: Free from fall injury  1/5/2025 0902 by Amada Trinidad RN  Outcome: Progressing  1/5/2025 0042 by Yesenia Herrear LPN  Outcome: Progressing     Problem: Skin/Tissue Integrity  Goal: Absence of new skin breakdown  Description: 1.  Monitor for areas of redness and/or skin breakdown  2.  Assess vascular access sites hourly  3.  Every 4-6 hours minimum:  Change oxygen saturation probe site  4.  Every 4-6 hours:  If on nasal continuous positive airway pressure, respiratory therapy assess nares and determine need for appliance change or resting period.  1/5/2025 0902 by Amada Trinidad RN  Outcome: Progressing  1/5/2025 0042 by Yesenia Herrera LPN  Outcome: Progressing     Problem: Pain  Goal: Verbalizes/displays adequate comfort level or baseline comfort level  1/5/2025 0902 by Amada Trinidad RN  Outcome: Progressing  1/5/2025 0042 by Yesenia Herrera LPN  Outcome: Progressing

## 2025-01-05 NOTE — PLAN OF CARE
Individualized Plan of Care  [facility] Rehabilitation Unit    Rehabilitation physician:  Dr. Montoya      Admit Date: 1/3/2025     Rehabilitation Diagnosis: Paraparesis (HCC)     Rehabilitation impairments: self care, mobility, motor dysfunction, bowel/bladder management, pain management, safety, cognitive function, and communication    Factors facilitating achievement of predicted outcomes: Family support, Friend support, Motivated, Cooperative, Pleasant, and Good insight into deficits  Barriers to the achievement of predicted outcomes: Pain, Cognitive deficit, Anxiety, Communication deficit, Decreased endurance, Decreased sensation, Decreased proprioception, Lower extremity weakness, Medical complications, Stairs at home, Skin Care, Wound Care, and Medication managment    Patient Goals: Improve independence with mobility, Improvement of mobility at a wheelchair level, Increase overall strength and endurance, Increase balance, Increase endurance, Increase independence with activities of daily living, Improve cognition, Increase self-awareness, Increase safety awareness, Increase community integration, Increase socialization, Functional communication with caregivers, Integrate appropriate pain management plan, Assure adequate nutritional option for discharge, Continence of bowel and bladder, and Provide appropriate patient and family education      NURSING:  Nursing goals for Suzan EVIE Garcia while on the rehabilitation unit will include:  Continence of bowel and bladder, Adequate number of bowel movements, Urinate with no urinary retention >300ml in bladder, Complete bladder protocol with good removal, Maintain O2 SATs at an acceptable level during stay, Effective pain management while on the rehabilitation unit, Establish adequate pain control plan for discharge, Absence of skin breakdown while on the rehabilitation unit, Improved skin integrity via assessments including wound measurements,

## 2025-01-05 NOTE — PLAN OF CARE
Problem: Discharge Planning  Goal: Discharge to home or other facility with appropriate resources  1/5/2025 0042 by Yesenia Herrera LPN  Outcome: Progressing     Problem: Safety - Adult  Goal: Free from fall injury  1/5/2025 0042 by Yesenia Herrera LPN  Outcome: Progressing     Problem: Skin/Tissue Integrity  Goal: Absence of new skin breakdown  Description: 1.  Monitor for areas of redness and/or skin breakdown  2.  Assess vascular access sites hourly  3.  Every 4-6 hours minimum:  Change oxygen saturation probe site  4.  Every 4-6 hours:  If on nasal continuous positive airway pressure, respiratory therapy assess nares and determine need for appliance change or resting period.  1/5/2025 0042 by Yesenia Herrera LPN  Outcome: Progressing     Problem: Pain  Goal: Verbalizes/displays adequate comfort level or baseline comfort level  1/5/2025 0042 by Yesenia Herrera LPN  Outcome: Progressing

## 2025-01-05 NOTE — PROGRESS NOTES
Physical Medicine & Rehabilitation  Progress Note      Subjective:      59-year-old female with bilateral lower extremity weakness secondary to Guillain-Barré syndrome.  No acute events overnight.  Patient seen and examined working with therapy, spoke via .  Patient reports she slept well last night, agreeable to maintain routine melatonin.  Patient tolerating increased frequency gabapentin and scheduled Tylenol, pain controlled.  Otherwise tolerating therapy well.  No new concerns at this time    ROS:  Via  ( ID: #[id number];  Name: Taurus)  Denies fevers, chills, sweats.  No chest pain, palpitations, lightheadedness.  Denies coughing, wheezing or shortness of breath.  Denies abdominal pain, nausea, diarrhea or constipation.  No new areas of joint pain.  Denies new areas of numbness or weakness.  Denies new anxiety or depression issues.  No new skin problems.    Rehabilitation:       PT:    Bed mobility  Rolling to Left: Contact guard assistance  Rolling to Right: Contact guard assistance  Supine to Sit: Minimal assistance  Sit to Supine: Minimal assistance  Scooting: Stand by assistance  Bed Mobility Comments: HOB flat, use of bed rails         Transfers  Sit to Stand: Minimal Assistance  Stand to Sit: Minimal Assistance  Bed to Chair: Minimal assistance (RW, SPT)  Stand Pivot Transfers: Minimal Assistance (both with and without device. Cues to reach for bed rail to pivot)  Comment: Transfers completed with RW unless noted otherwise. Requires safety cues for hand placement         Ambulation  Surface: Level tile  Device: Rolling Walker  Other Apparatus: Wheelchair follow  Assistance: Minimal assistance  Quality of Gait: Varied step length, narrow HORTENSIA, increased BUE reliance on RW  Gait Deviations: Slow No, Decreased step length, Decreased step height (NBOS)  Distance: 77ft and 81ft  Comments: Pt reports fatigue post ambulation, short seated rest break between trials

## 2025-01-06 LAB
GLUCOSE BLD-MCNC: 106 MG/DL (ref 65–105)
GLUCOSE BLD-MCNC: 95 MG/DL (ref 65–105)

## 2025-01-06 PROCEDURE — 6360000002 HC RX W HCPCS

## 2025-01-06 PROCEDURE — 97129 THER IVNTJ 1ST 15 MIN: CPT

## 2025-01-06 PROCEDURE — 97116 GAIT TRAINING THERAPY: CPT

## 2025-01-06 PROCEDURE — 82947 ASSAY GLUCOSE BLOOD QUANT: CPT

## 2025-01-06 PROCEDURE — 6370000000 HC RX 637 (ALT 250 FOR IP)

## 2025-01-06 PROCEDURE — 97130 THER IVNTJ EA ADDL 15 MIN: CPT

## 2025-01-06 PROCEDURE — 99232 SBSQ HOSP IP/OBS MODERATE 35: CPT | Performed by: GENERAL PRACTICE

## 2025-01-06 PROCEDURE — 97535 SELF CARE MNGMENT TRAINING: CPT

## 2025-01-06 PROCEDURE — 97530 THERAPEUTIC ACTIVITIES: CPT

## 2025-01-06 PROCEDURE — 6370000000 HC RX 637 (ALT 250 FOR IP): Performed by: GENERAL PRACTICE

## 2025-01-06 PROCEDURE — 1180000000 HC REHAB R&B

## 2025-01-06 PROCEDURE — 97110 THERAPEUTIC EXERCISES: CPT

## 2025-01-06 PROCEDURE — 99232 SBSQ HOSP IP/OBS MODERATE 35: CPT | Performed by: INTERNAL MEDICINE

## 2025-01-06 RX ORDER — GABAPENTIN 400 MG/1
400 CAPSULE ORAL 3 TIMES DAILY
Status: DISCONTINUED | OUTPATIENT
Start: 2025-01-06 | End: 2025-01-07

## 2025-01-06 RX ORDER — GABAPENTIN 100 MG/1
100 CAPSULE ORAL ONCE
Status: COMPLETED | OUTPATIENT
Start: 2025-01-06 | End: 2025-01-06

## 2025-01-06 RX ADMIN — ACETAMINOPHEN 650 MG: 325 TABLET ORAL at 14:44

## 2025-01-06 RX ADMIN — CARBOXYMETHYLCELLULOSE SODIUM 1 DROP: 5 SOLUTION/ DROPS OPHTHALMIC at 14:48

## 2025-01-06 RX ADMIN — ENOXAPARIN SODIUM 40 MG: 100 INJECTION SUBCUTANEOUS at 08:14

## 2025-01-06 RX ADMIN — Medication 6 MG: at 22:27

## 2025-01-06 RX ADMIN — CARBOXYMETHYLCELLULOSE SODIUM 1 DROP: 5 SOLUTION/ DROPS OPHTHALMIC at 08:15

## 2025-01-06 RX ADMIN — GABAPENTIN 100 MG: 100 CAPSULE ORAL at 14:57

## 2025-01-06 RX ADMIN — GABAPENTIN 400 MG: 400 CAPSULE ORAL at 22:26

## 2025-01-06 RX ADMIN — CARBOXYMETHYLCELLULOSE SODIUM 1 DROP: 5 SOLUTION/ DROPS OPHTHALMIC at 22:27

## 2025-01-06 RX ADMIN — GABAPENTIN 300 MG: 300 CAPSULE ORAL at 08:14

## 2025-01-06 RX ADMIN — AMLODIPINE BESYLATE 2.5 MG: 2.5 TABLET ORAL at 08:14

## 2025-01-06 RX ADMIN — TIZANIDINE 4 MG: 4 TABLET ORAL at 00:35

## 2025-01-06 RX ADMIN — BUTALBITA,ACETAMINOPHEN AND CAFFEINE 1 CAPSULE: 50; 300; 40 CAPSULE ORAL at 05:31

## 2025-01-06 RX ADMIN — ACETAMINOPHEN 650 MG: 325 TABLET ORAL at 22:25

## 2025-01-06 RX ADMIN — ACETAMINOPHEN 650 MG: 325 TABLET ORAL at 05:30

## 2025-01-06 RX ADMIN — GABAPENTIN 300 MG: 300 CAPSULE ORAL at 14:44

## 2025-01-06 RX ADMIN — ROSUVASTATIN 40 MG: 40 TABLET, FILM COATED ORAL at 22:26

## 2025-01-06 ASSESSMENT — PAIN - FUNCTIONAL ASSESSMENT
PAIN_FUNCTIONAL_ASSESSMENT: ACTIVITIES ARE NOT PREVENTED

## 2025-01-06 ASSESSMENT — PAIN DESCRIPTION - ORIENTATION
ORIENTATION: RIGHT;LEFT
ORIENTATION: RIGHT
ORIENTATION: INNER
ORIENTATION: LOWER

## 2025-01-06 ASSESSMENT — PAIN DESCRIPTION - DESCRIPTORS
DESCRIPTORS: ACHING
DESCRIPTORS: DISCOMFORT
DESCRIPTORS: ACHING
DESCRIPTORS: TINGLING;BURNING

## 2025-01-06 ASSESSMENT — PAIN DESCRIPTION - LOCATION
LOCATION: ABDOMEN;LEG
LOCATION: BACK
LOCATION: HEAD
LOCATION: LEG;ABDOMEN

## 2025-01-06 ASSESSMENT — PAIN SCALES - GENERAL
PAINLEVEL_OUTOF10: 2
PAINLEVEL_OUTOF10: 0
PAINLEVEL_OUTOF10: 6
PAINLEVEL_OUTOF10: 0
PAINLEVEL_OUTOF10: 10
PAINLEVEL_OUTOF10: 0
PAINLEVEL_OUTOF10: 8
PAINLEVEL_OUTOF10: 4
PAINLEVEL_OUTOF10: 0

## 2025-01-06 NOTE — PROGRESS NOTES
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 right-handed female with history of HTN admitted to [facility] on 2024.       She initially presented with left facial droop and bilateral lower limb weakness for 1 day.  She had been recently admitted to [place] with cholelithiasis and thoracic and abdominal lymphadenopathy.  Upon the current presentation, MRI brain showed no acute intracranial abnormality.  MRI of the spine showed no acute abnormalities.  Lumbar puncture was done on 24.  She is currently receiving a 5-day course of IVIG for Guillain-[place] syndrome (last dose 25).     She reports ongoing bilateral lower limb weakness, right greater than left.  She also notes numbness/tingling in the bilateral lower limbs.  She states that she continues to have some weakness in the left side of her face along with speech difficulty.  She reports blurred vision as well.  per neuro MD 1-3: Bilateral, R>L, leg weakness and left CN VII palsy. In a patient with CSF albuminocytologic dissociation, consistent with Guillain-Barré syndrome.  Somewhat atypical given asymmetric findings, although similar cases have previously been reported in literature. GM AB panel negative. Symptoms improved with IVIG.    Treatment Diagnosis: Impaired self-care status due to GB with BLE weakness affecting mobility.    Past Medical History:  has a past medical history of Chronic back pain.    Past Surgical History:   has a past surgical history that includes  section and IR BIOPSY LYMPH NODE SUPERFICIAL  tolerate dynamic/static standing/ ambulating with least restrictive device and mod indep for12+ mins during therapeutic exercise/functional activity for improved activity tolerance  Long Term Goal 5: mod indep simple advanced adls with good balance and safety.  Additional Goals?: Yes  Long Term Goal 6: increase R  from 35 to 38 and L  from 28 to 32 to increase hand strength for adls.    Plan  Occupational Therapy Plan  Times Per Week: 5-7  Times Per Day: Twice a day  Current Treatment Recommendations: Balance training, Functional mobility training, Endurance training, Safety education & training, Patient/Caregiver education & training, Equipment evaluation, education, & procurement, Home management training, Self-Care / ADL, Group Therapy      OT Individual Minutes        01/06/25 0905 01/06/25 0945   OT Individual Minutes   Time In 0901 1305   Time Out 1004 1332   Minutes 63 27         Electronically signed by YOSSI Rai on 1/6/25 at 3:37 PM EST

## 2025-01-06 NOTE — PROGRESS NOTES
Physical Therapy  Facility/Department: [facility] ACUTE REHAB  Rehabilitation Physical Therapy Daily Treatment Note    NAME: Suzan Garcia  : 1965 (59 y.o.)  MRN: [id number]  CODE STATUS: Full Code    Date of Service: 25      Past Medical History:   Diagnosis Date    Chronic back pain      Past Surgical History:   Procedure Laterality Date     SECTION      IR BIOPSY LYMPH NODE SUPERFICIAL  2024    IR BIOPSY LYMPH NODE SUPERFICIAL 2024 [facility] SPECIAL PROCEDURES       Patient assessed for rehabilitation services?: Yes  Family/Caregiver Present: No  Referring Practitioner: Dr Montoya  Referral Date : 25  Diagnosis: Paraperesis  Other (Comment): Bulgarian interpretor used through out the session.    Restrictions:  Restrictions/Precautions: Fall Risk;General Precautions     SUBJECTIVE  Subjective: Pt pleasant and cooperative with therapy.    OBJECTIVE    Cognition  Overall Cognitive Status: WFL      Functional Mobility  Bed mobility  Sit to Supine: Stand by assistance  Scooting: Stand by assistance  Bed Mobility Comments: HOB flat, use of bed rails  Transfers  Sit to Stand: Contact guard assistance (cues for hand placement 50% of time)  Stand to Sit: Contact guard assistance (cues for hand placement)  Stand Pivot Transfers: Contact guard assistance (Cues for safe hand palcement. Improved sequencing with stand pivots this date)  Balance  Posture: Good  Sitting - Static: Good  Sitting - Dynamic: Fair;+  Standing - Static: Fair  Standing - Dynamic: Fair;-  Comments: Standing with RW    Environmental Mobility  Ambulation  Surface: Level tile;Ramp  Device: Rolling Walker  Other Apparatus: Wheelchair follow  Assistance: Contact guard assistance  Quality of Gait: Varied step length, NBOS- heels almost touching and RLE nearly crossing midline. downward gaze- corrects with cues but reverts back and forth due to neck pain with forward gaze, slowed jyoti with fatigue  Gait Deviations: Slow  for Short Term Goals: 1 week  Short Term Goal 1: mod-I bed mobility  Short Term Goal 2: CGA transfers from various surfaces  Short Term Goal 3: pt able to ambulate with RW  150ft , CGA on level surfaces  Short Term Goal 4: pt to ambulate on incline surface with RW, min A  Short Term Goal 5: pt able to perform 4 to 8 steps with B rails, miin A  Short Term Goal 6: pt to propel w/c , level surfaces, distance of 150 ft, CGA , including truns to improve strength and endurance to bring herelf to therapy gym.  Long Term Goals  Time Frame for Long Term Goals : By DC  Long Term Goal 1: Transfers mod-I from various surfaces  Long Term Goal 2: Car transfers SBA  Long Term Goal 3: Ambulation with RW/Rollator distance of 50 ft, mod-I , levle surfaces  Long Term Goal 4: Pt to ambulate distance of 30 ft x2, in incline surfaces, with RW/Rollaotr, CGA  Long Term Goal 5: Pt to ambulate distance of 150 ft (2MWT) with RW/Rollaotr on level surfaces, SBA to improve independence and endurance.  Long term goal 6: Pt able to perform flight of steps with 1 UE support, SBA.  Long term goal 7: Pt to improve Tinetti balance score from 4/28 to 21/28  atleast to reduce fall risk.  Long term goal 8: Pt to propel w/c, distance of 150 ft, level surfaces , mod-I to improve strength an endurance.    PLAN OF CARE  Frequency: 1-2 treatment sessions per day, 5-7 days per week  Physical Therapy Plan  General Plan:  minutes of therapy at least 5 out of 7 days a week (1.5 hr/day, 5-7x per week)  Current Treatment Recommendations: Gait training;Stair training;Functional mobility training;Balance training;Transfer training;Therapeutic activities;Safety education & training;Endurance training  Safety Devices  Type of Devices: All fall risk precautions in place;Left in chair;Chair alarm in place;Call light within reach    EDUCATION  Education  Education Given To: Patient  Education Provided: Role of Therapy;Plan of Care;Safety;Fall Prevention  Strategies;Transfer Training;Mobility Training  Education Provided Comments: Emphasis on energy conservation and not to over work self and push to excessive fatigue. Advised to use fatigue as a guidance for seated rest with new diagnosis of GBS to improve safety. Patient is very adamant about wanting to walk more.  Education Method: Demonstration;Verbal  Barriers to Learning: Other (Comment) (language barrier)  Education Outcome: Verbalized understanding;Demonstrated understanding           01/06/25 1005 01/06/25 1332   PT Individual Minutes   Time In 1005 1332   Time Out 1106 1416   Minutes 61 44               Massimo Sandhu PTA, 01/06/25 at 4:50 PM

## 2025-01-06 NOTE — CONSULTS
Comprehensive Nutrition Assessment    Type and Reason for Visit:  Initial, Consult    Nutrition Recommendations/Plan:   Continue Regular diet     Malnutrition Assessment:  Malnutrition Status:  Insufficient data (01/06/25 1346)    Context:  Acute Illness     Findings of the 6 clinical characteristics of malnutrition:  Energy Intake:  Unable to assess  Weight Loss:  Unable to assess     Body Fat Loss:  Unable to assess     Muscle Mass Loss:  Unable to assess    Fluid Accumulation:  No fluid accumulation     Strength:  Not Performed    Nutrition Assessment:    Pt admitted with paraparesis and Hx chronic back pain.  Pt was with therapy and unavailable for interview and nutrition focused physical exam unable to be done at this time.  Spoke with nurse who reports Pt has consumed 100% of meals so far today, likely meeting nutritional needs.  Weight records show few measured weights, with significant discrepencies showing a weight loss of ~10 kg in only a few days, likley inaccurate.  Most weights are near ~71 kg, much higher than CBW of 59.7 kg.  Pt noted to have diarrhea today per nursing documentation though no edema.  Will continue to monitor oral intakes and weights.    Nutrition Related Findings:    Meds/Labs reviewed.  Albumin 3.7. Wound Type: Surgical Incision       Current Nutrition Intake & Therapies:    Average Meal Intake: %  Average Supplements Intake: None Ordered  ADULT DIET; Regular    Anthropometric Measures:  Height: 154 cm (5' 0.63\")  Ideal Body Weight (IBW): 103 lbs (47 kg)    Admission Body Weight: 59.7 kg (131 lb 9.8 oz)  Current Body Weight: 59.7 kg (131 lb 9.8 oz), 127.8 % IBW. Weight Source: Bed scale  Current BMI (kg/m2): 25.2  Usual Body Weight: 71 kg (156 lb 8.4 oz)     % Weight Change (Calculated): -15.9  Weight Adjustment For: No Adjustment                 BMI Categories: Overweight (BMI 25.0-29.9)    Estimated Daily Nutrient Needs:  Energy Requirements Based On: Formula  Weight Used

## 2025-01-06 NOTE — PROGRESS NOTES
Physical Medicine & Rehabilitation  Progress Note        Subjective:      59-year-old female with bilateral lower extremity weakness secondary to Guillain-Barré syndrome.  No acute events overnight.  Patient seen and examined working with therapy.  Patient reports some headaches last night, improved with Tylenol.  Pain is otherwise well-controlled with routine Tylenol and gabapentin.  Endorses waxing and waning bilateral lower extremity weakness but overall tolerating therapy well.  No further concerns.      ROS:  Denies fevers, chills, sweats.  No chest pain, palpitations, lightheadedness.  Denies coughing, wheezing or shortness of breath.  Denies abdominal pain, nausea, diarrhea or constipation.  No new areas of joint pain.  Denies new areas of numbness or weakness.  Denies new anxiety or depression issues.  No new skin problems.    Rehabilitation:       PT:    Bed mobility  Rolling to Left: Contact guard assistance  Rolling to Right: Stand by assistance  Supine to Sit: Stand by assistance  Sit to Supine: Stand by assistance  Scooting: Stand by assistance  Bed Mobility Comments: HOB flat, use of bed rails         Transfers  Sit to Stand: Contact guard assistance (Cues for hand placement)  Stand to Sit: Contact guard assistance (Cues for hand placement)  Bed to Chair: Contact guard assistance (SPT no device)  Stand Pivot Transfers: Contact guard assistance (no device. Cues for hand placement)  Comment: Transfers completed with RW unless noted otherwise. Requires safety cues for hand placement         Ambulation  Surface: Level tile  Device: Rolling Walker  Other Apparatus: Wheelchair follow  Assistance: Contact guard assistance  Quality of Gait: Varied step length but more symmetrical this date. NBOS- heels almost touching. downward gaze- corrects with cues but reverts back and forth due to neck pain with forward gaze  Gait Deviations: Slow No, Decreased step length, Decreased step height (NBOS)  Distance:

## 2025-01-06 NOTE — PROGRESS NOTES
Speech Language Pathology  [facility] ACUTE REHAB    Cognitive Treatment Note    Date: 1/6/2025  Patient’s Name: Suzan Garcia  MRN: [id number]  Diagnosis:   Patient Active Problem List   Diagnosis Code    ASCUS with positive high risk HPV cervical R87.610, R87.810    Screening mammogram for breast cancer Z12.31    Sleep disturbance G47.9    Cholelithiasis without cholangitis K80.20    Biliary colic K80.50    Cervical lymphadenopathy R59.0    Lymphadenopathy, generalized R59.1    Thrombocytopenia (HCC) D69.6    Leg weakness, bilateral R29.898    Bell's palsy G51.0    Guillain Barré syndrome (HCC) G61.0    Paraparesis ([facility]) G82.20       Pain Rating: None reported    Cognitive Treatment    Treatment time: 0830-0903      Subjective: [x] Alert [x] Cooperative     [] Confused     [] Agitated    [] Lethargic      Objective/Assessment:    Recall: Picture recall following 5-minute delay: 8/10 increased to 9/10 with mod verbal cues.    Problem Solving/Reasoning: Divergent naming (fruits): +10 independently increased to 14 with min verbal cues. Pt. Reporting word finding deficits this date.     Facial exercises: ST instructed pt. To complete facial exercises targeting labial strength and ROM. Pt. Completed exercises x2 sets x10 reps each. Pt. Reports she has been completing exercises independently t/o the day and is reporting decreased L side weakness in the face. ST reinforcing completion of exercises.    Other:  via language services used t/o session. No family present. Pt. In chair with alarm set and call light within reach at end of session.    Plan:  [x] Continue ST services    [] Discharge from ST:      Discharge recommendations:  [x] Further therapy recommended at discharge.   [] No therapy recommended at discharge.      Treatment completed by: Sulma Kumar M.S. CF-SLP

## 2025-01-06 NOTE — PROGRESS NOTES
[facility]   IN-PATIENT SERVICE   [facility]    Consult note            Date:   2025  Patient name:  Suzan Garcia  Date of admission:  1/3/2025  7:28 PM  MRN:   [id number]  Account:  [id number]  YOB: 1965  PCP:    No primary care provider on file.  Room:   [address] [place] [address]  Code Status:    Full Code    Chief Complaint:     No chief complaint on file.      History Obtained From:     patient    History of Present Illness:     The patient is a 59 y.o.   /  female who presents with No chief complaint on file.   and she is admitted to the hospital for the management of    Patient is 59-year-old female who initially came to the hospital with left-sided facial droop and bilateral lower extremity weakness, seen by neurology, head CT CTA was negative,MRI brain showed no acute intracranial abnormalities, except for some microvascular changes.MRI cervical spine showed mild spinal canal stenosis at C6-C7 and T1-T2. MRI thoracic spine showed mild spinal stenosis at T8-T9 secondary to posterior disc protrusion. MRI lumbar spine showed central disc protrusion at L4-L5, moderately narrows the spinal canal. Posterior disc bulge at L3-L4 and mild spinal canal stenosis. Mild bilateral neural foraminal narrowing at L3-L4 and L4-L5.  LP consistent with albumin cytologic dissociation, seen by neurology, started on IVIG, completed 5  Currently admitted at [facility] for further   was used during encounter    Past Medical History:     Past Medical History:   Diagnosis Date    Chronic back pain         Past Surgical History:     Past Surgical History:   Procedure Laterality Date     SECTION      IR BIOPSY LYMPH NODE SUPERFICIAL  2024    IR BIOPSY LYMPH NODE SUPERFICIAL 2024 [facility] SPECIAL PROCEDURES        Medications Prior to Admission:     Prior to Admission medications    Medication Sig Start Date End Date Taking? Authorizing Provider    HEMATOLOGIC/LYMPHATIC:  negative for swelling/edema   ALLERGIC/IMMUNOLOGIC:  negative for urticaria , itching  ENDOCRINE:  negative increase in drinking, increase in urination, hot or cold intolerance  MUSCULOSKELETAL:  negative joint pains, muscle aches, swelling of joints  NEUROLOGICAL: Refer weakness  BEHAVIOR/PSYCH:  negative for depression, anxiety    Physical Exam:   BP (!) 143/74   Pulse 66   Temp 97 °F (36.1 °C) (Oral)   Resp 18   Ht 1.54 m (5' 0.63\")   Wt 59.7 kg (131 lb 9.8 oz)   LMP 10/13/2015 (Approximate) Comment: possible menopause  SpO2 95%   BMI 25.17 kg/m²   Temp (24hrs), Av.7 °F (36.5 °C), Min:97 °F (36.1 °C), Max:98.4 °F (36.9 °C)    Recent Labs     25  1119 25  1641 25  1116   POCGLU 231* 92 95     No intake or output data in the 24 hours ending 25 1354    General Appearance:  alert, well appearing, and in no acute distress  Mental status: oriented to person, place, and time with normal affect  Head:  normocephalic, atraumatic.  Eye: no icterus, redness, pupils equal and reactive, extraocular eye movements intact, conjunctiva clear  Ear: normal external ear, no discharge, hearing intact  Nose:  no drainage noted  Mouth: mucous membranes moist  Neck: supple, no carotid bruits, thyroid not palpable  Lungs: Bilateral equal air entry, clear to ausculation, no wheezing, rales or rhonchi, normal effort  Cardiovascular: normal rate, regular rhythm, no murmur, gallop, rub.  Abdomen: Soft, nontender, nondistended, normal bowel sounds, no hepatomegaly or splenomegaly  Neurologic: Strength right lower extremity 2/5, right upper extremity 5/5, left upper extremity 4/5 skin: No gross lesions, rashes, bruising or bleeding on exposed skin area  Extremities:  peripheral pulses palpable, no pedal edema or calf pain with palpation      Investigations:      Laboratory Testing:  Recent Results (from the past 24 hour(s))   POC Glucose Fingerstick    Collection Time: 25

## 2025-01-06 NOTE — PROGRESS NOTES
Pt c/o feeling like \"bugs are crawling\" in her stomach and she has numbness and tingling down her rt leg and has [id number] sensation in rt foot and she has trouble lifting rt leg and putting wt on leg.  Dr Montoya notified and orders received.

## 2025-01-06 NOTE — PATIENT CARE CONFERENCE
[facility] Acute Inpatient Rehabilitation  TEAM CONFERENCE NOTE  Date: 25  Patient Name: Suzan Garcia       Room: 2641/2641-01  MRN: [id number]       : 1965  (59 y.o.)     Gender: female     Referring Practitioner: Dr Montoya     PRINCIPAL DIAGNOSIS: Paraparesis (HCC)    NURSING--------------------------------------------------------------------------------    Bladder Continence  Always Continent  Bowel Continence Always Continent    Date of Last BM: 2025    Bladder/Bowel Program Interventions: Both Bowel & Bladder Program In Place     Wounds/Incisions/Ulcers: No skin issues identified    Pain Control: Patient's pain currently controlled and pain regimen effective as ordered    Pain Medication Regimen Usage Pattern: MAR reviewed and pain medications are being used at the following frequency (Specify Medication, # Doses Administered on average per day, identified patterns of use - for example: time of day, prior to activity/therapy)  Tylenol 650 mg scheduled q8. Gabapentin 400 mg scheduled q3.    Fall Risk:  Falling star program initiated    Medication Education Program: Patient able to manage medications and being educated by nursing    Discharge Preparation Patient/Responsible Party Education In Progress:   [Complete for All Patients] Rehab Specific Admitting Diagnosis Education: Guillain [place] syndrome and Fall Prevention    Nursing specific communication for TEAM: No additional information identified requiring communication at this time    PHYSICAL THERAPY------------------------------------------------------------------    Bed mobility  Rolling to Right: Stand by assistance  Supine to Sit: Stand by assistance  Sit to Supine: Stand by assistance  Scooting: Stand by assistance  Bed Mobility Comments: HOB flat, use of bed rails    Transfers:  Sit to Stand: Contact guard assistance (cues for hand placement 50% of time)  Stand to Sit: Contact guard assistance (cues for hand  Results   Impaired cognition Cognitive retraining exercises    L facial weakness Facial exercises addressing ROM and strength    Impaired functional mobility  Strengthening, endurance training, neuro re-education and functional mobility and gait re-training using AD. Pt and family education and training    Altered ability to care for self Remediation and training in modified care strategies to increase safety and independence with self-care tasks                     Total SELF CARE Score Admission Score:  27   Discharge Goal:  42 / 42      Total MOBILITY Score Admission Score:  27 Discharge Goal:  75 / 90           THERAPY MINUTE COMPLIANCE  Patient is participating and compliant with meeting therapy minutes as outlined in plan of care: Yes`    Discharge Plan   Estimated Discharge Date: 1/16/25  Home evaluation needed? No  Overnight or Day Pass: No  Factors facilitating achievement of predicted outcomes: Family support, Motivated, Cooperative, Pleasant, and Good insight into deficits  Barriers to the achievement of predicted outcomes: Pain, Cognitive deficit, Anxiety, Decreased sensation, Decreased proprioception, Lower extremity weakness, Stairs at home, Skin Care, Wound Care, Medication managment, and language barrier    Functional Goals at discharge:  Predicted Outcome: Home with familyPATIENT'S LEVEL OF ASSISTANCE: Occasional Supervision , Contact Guard / Touching Assistance, Stand By Assistance , and Minimal Assistance  Discharge therapy goals:  PT: Long Term Goals  Time Frame for Long Term Goals : By DC  Long Term Goal 1: Transfers mod-I from various surfaces  Long Term Goal 2: Car transfers SBA  Long Term Goal 3: Ambulation with RW/Rollator distance of 50 ft, mod-I , levle surfaces  Long Term Goal 4: Pt to ambulate distance of 30 ft x2, in incline surfaces, with RW/Rollaotr, CGA  Long Term Goal 5: Pt to ambulate distance of 150 ft (2MWT) with RW/Rollaotr on level surfaces, SBA to improve independence and

## 2025-01-07 PROCEDURE — 97116 GAIT TRAINING THERAPY: CPT

## 2025-01-07 PROCEDURE — 97535 SELF CARE MNGMENT TRAINING: CPT

## 2025-01-07 PROCEDURE — 6370000000 HC RX 637 (ALT 250 FOR IP)

## 2025-01-07 PROCEDURE — 97129 THER IVNTJ 1ST 15 MIN: CPT

## 2025-01-07 PROCEDURE — 97110 THERAPEUTIC EXERCISES: CPT

## 2025-01-07 PROCEDURE — 1180000000 HC REHAB R&B

## 2025-01-07 PROCEDURE — 92507 TX SP LANG VOICE COMM INDIV: CPT

## 2025-01-07 PROCEDURE — 99232 SBSQ HOSP IP/OBS MODERATE 35: CPT | Performed by: INTERNAL MEDICINE

## 2025-01-07 PROCEDURE — 6360000002 HC RX W HCPCS

## 2025-01-07 PROCEDURE — 99232 SBSQ HOSP IP/OBS MODERATE 35: CPT | Performed by: GENERAL PRACTICE

## 2025-01-07 PROCEDURE — 97530 THERAPEUTIC ACTIVITIES: CPT

## 2025-01-07 PROCEDURE — 6370000000 HC RX 637 (ALT 250 FOR IP): Performed by: GENERAL PRACTICE

## 2025-01-07 RX ORDER — GABAPENTIN 400 MG/1
400 CAPSULE ORAL 2 TIMES DAILY
Status: DISCONTINUED | OUTPATIENT
Start: 2025-01-08 | End: 2025-01-12

## 2025-01-07 RX ORDER — GABAPENTIN 300 MG/1
600 CAPSULE ORAL NIGHTLY
Status: DISCONTINUED | OUTPATIENT
Start: 2025-01-07 | End: 2025-01-12

## 2025-01-07 RX ADMIN — CARBOXYMETHYLCELLULOSE SODIUM 1 DROP: 5 SOLUTION/ DROPS OPHTHALMIC at 17:46

## 2025-01-07 RX ADMIN — ACETAMINOPHEN 650 MG: 325 TABLET ORAL at 12:55

## 2025-01-07 RX ADMIN — Medication 6 MG: at 22:23

## 2025-01-07 RX ADMIN — ACETAMINOPHEN 650 MG: 325 TABLET ORAL at 05:42

## 2025-01-07 RX ADMIN — CARBOXYMETHYLCELLULOSE SODIUM 1 DROP: 5 SOLUTION/ DROPS OPHTHALMIC at 07:57

## 2025-01-07 RX ADMIN — ENOXAPARIN SODIUM 40 MG: 100 INJECTION SUBCUTANEOUS at 07:57

## 2025-01-07 RX ADMIN — CARBOXYMETHYLCELLULOSE SODIUM 1 DROP: 5 SOLUTION/ DROPS OPHTHALMIC at 12:56

## 2025-01-07 RX ADMIN — ACETAMINOPHEN 650 MG: 325 TABLET ORAL at 22:21

## 2025-01-07 RX ADMIN — ROSUVASTATIN 40 MG: 40 TABLET, FILM COATED ORAL at 22:21

## 2025-01-07 RX ADMIN — CARBOXYMETHYLCELLULOSE SODIUM 1 DROP: 5 SOLUTION/ DROPS OPHTHALMIC at 22:23

## 2025-01-07 RX ADMIN — BUTALBITA,ACETAMINOPHEN AND CAFFEINE 1 CAPSULE: 50; 300; 40 CAPSULE ORAL at 20:17

## 2025-01-07 RX ADMIN — AMLODIPINE BESYLATE 2.5 MG: 2.5 TABLET ORAL at 07:57

## 2025-01-07 RX ADMIN — GABAPENTIN 600 MG: 300 CAPSULE ORAL at 22:21

## 2025-01-07 RX ADMIN — GABAPENTIN 400 MG: 400 CAPSULE ORAL at 07:57

## 2025-01-07 RX ADMIN — GABAPENTIN 400 MG: 400 CAPSULE ORAL at 12:55

## 2025-01-07 ASSESSMENT — PAIN DESCRIPTION - DESCRIPTORS
DESCRIPTORS: ACHING;THROBBING
DESCRIPTORS: TINGLING
DESCRIPTORS: TINGLING

## 2025-01-07 ASSESSMENT — PAIN SCALES - GENERAL
PAINLEVEL_OUTOF10: 0
PAINLEVEL_OUTOF10: 5
PAINLEVEL_OUTOF10: 4
PAINLEVEL_OUTOF10: 0
PAINLEVEL_OUTOF10: 7
PAINLEVEL_OUTOF10: 2

## 2025-01-07 ASSESSMENT — PAIN DESCRIPTION - ORIENTATION
ORIENTATION: LEFT;RIGHT
ORIENTATION: INNER
ORIENTATION: RIGHT;LEFT

## 2025-01-07 ASSESSMENT — PAIN - FUNCTIONAL ASSESSMENT
PAIN_FUNCTIONAL_ASSESSMENT: ACTIVITIES ARE NOT PREVENTED

## 2025-01-07 ASSESSMENT — PAIN DESCRIPTION - LOCATION
LOCATION: HEAD
LOCATION: HAND;LEG
LOCATION: LEG

## 2025-01-07 NOTE — PROGRESS NOTES
[facility]   IN-PATIENT SERVICE   [facility]    Consult note            Date:   2025  Patient name:  Suzan Garcia  Date of admission:  1/3/2025  7:28 PM  MRN:   [id number]  Account:  [id number]  YOB: 1965  PCP:    No primary care provider on file.  Room:   [address] [place] [address]  Code Status:    Full Code    Chief Complaint:     No chief complaint on file.      History Obtained From:     patient    History of Present Illness:     The patient is a 59 y.o.   /  female who presents with No chief complaint on file.   and she is admitted to the hospital for the management of    Patient is 59-year-old female who initially came to the hospital with left-sided facial droop and bilateral lower extremity weakness, seen by neurology, head CT CTA was negative,MRI brain showed no acute intracranial abnormalities, except for some microvascular changes.MRI cervical spine showed mild spinal canal stenosis at C6-C7 and T1-T2. MRI thoracic spine showed mild spinal stenosis at T8-T9 secondary to posterior disc protrusion. MRI lumbar spine showed central disc protrusion at L4-L5, moderately narrows the spinal canal. Posterior disc bulge at L3-L4 and mild spinal canal stenosis. Mild bilateral neural foraminal narrowing at L3-L4 and L4-L5.  LP consistent with albumin cytologic dissociation, seen by neurology, started on IVIG, completed 5  Currently admitted at [facility] for further   was used during encounter    Past Medical History:     Past Medical History:   Diagnosis Date    Chronic back pain         Past Surgical History:     Past Surgical History:   Procedure Laterality Date     SECTION      IR BIOPSY LYMPH NODE SUPERFICIAL  2024    IR BIOPSY LYMPH NODE SUPERFICIAL 2024 [facility] SPECIAL PROCEDURES        Medications Prior to Admission:     Prior to Admission medications    Medication Sig Start Date End Date Taking? Authorizing Provider

## 2025-01-07 NOTE — PROGRESS NOTES
Physical Therapy  [facility]   Acute Rehabilitation Physical Therapy Treatment    Date: 25  Patient Name: Suzan Garcia       Room: 2641/2641-01  MRN: [id number]  Account: [id number]   : 1965  (59 y.o.) Gender: female     Diagnosis: Guillain [place] Syndrome with paraparesis , cranial nerve VII palsy, Dysphagia    Treatment Diagnosis: Paraperesis    Past Medical History:  has a past medical history of Chronic back pain.    Past Surgical History:   has a past surgical history that includes  section and IR BIOPSY LYMPH NODE SUPERFICIAL (2024).    Restrictions  Restrictions/Precautions  Restrictions/Precautions: Fall Risk, General Precautions  Activity Level: Up as Tolerated, Up with Assist  Required Braces or Orthoses?: No    SUBJECTIVE   25 1010   General   Response To Previous Treatment Patient with no complaints from previous session.   Family/Caregiver Present No  (niece present at end of PM session)   Other (Comment) Tongan interpretor used throughout the sessions (Leslie #[id number] AM, Pati #[id number] PM).   General   General Comments On PM exit, Pt asking for bed alarm to be turned off so niece could help her bathe; writer notified RNs Lizzette & Vivian Park states she would recommend having PCT present for safety.   Subjective  Subjective: Pt pleasant and cooperative with therapy. States she had difficulty sleeping due to abdominal pain; upon receiving medication early AM, Pt states she was finally able to get some rest. Reports feeling as though her muscles are stretching/during seated ex, \"like they're coming back to life,\" per .  Pain  Pre-Pain: 0 (denies pain)     OBJECTIVE  Bed Mobility   Bed mobility  Supine to Sit: Supervision  Bed Mobility Comments: HOB slightly elevated, use of bed rails    Transfers   Transfers  Sit to Stand: Contact guard assistance (G hand placement noted today.)  Stand to Sit: Contact guard assistance  Bed to  barrier)  Education Outcome: Verbalized understanding;Continued education needed     GOALS  Short Term Goals  Time Frame for Short Term Goals: 1 week  Short Term Goal 1: mod-I bed mobility  Short Term Goal 2: CGA transfers from various surfaces  Short Term Goal 3: pt able to ambulate with RW  150ft , CGA on level surfaces  Short Term Goal 4: pt to ambulate on incline surface with RW, min A  Short Term Goal 5: pt able to perform 4 to 8 steps with B rails, miin A  Short Term Goal 6: pt to propel w/c , level surfaces, distance of 150 ft, CGA , including truns to improve strength and endurance to bring herelf to therapy gym.    Long Term Goals  Time Frame for Long Term Goals : By DC  Long Term Goal 1: Transfers mod-I from various surfaces  Long Term Goal 2: Car transfers SBA  Long Term Goal 3: Ambulation with RW/Rollator distance of 50 ft, mod-I , levle surfaces  Long Term Goal 4: Pt to ambulate distance of 30 ft x2, in incline surfaces, with RW/Rollaotr, CGA  Long Term Goal 5: Pt to ambulate distance of 150 ft (2MWT) with RW/Rollaotr on level surfaces, SBA to improve independence and endurance.  Long term goal 6: Pt able to perform flight of steps with 1 UE support, SBA.  Long term goal 7: Pt to improve Tinetti balance score from 4/28 to 21/28  atleast to reduce fall risk.  Long term goal 8: Pt to propel w/c, distance of 150 ft, level surfaces , mod-I to improve strength an endurance.      PLAN OF CARE  Physical Therapy Plan  General Plan:  minutes of therapy at least 5 out of 7 days a week (1.5 hr/day, 5-7x per week)  Current Treatment Recommendations: Gait training, Stair training, Functional mobility training, Balance training, Transfer training, Therapeutic activities, Safety education & training, Endurance training     Safety Devices  Type of Devices: All fall risk precautions in place, Left in chair, Call light within reach, Bed alarm in place, Nurse notified, Patient at risk for falls, Gait belt, Chair

## 2025-01-07 NOTE — CARE COORDINATION
[facility] Acute Inpatient Rehabilitation  Case Management Assessment  Initial Evaluation    Date/Time of Evaluation: 1/7/2025 5:16 PM  Assessment Completed by: Dea Dacosta RN    If patient is discharged prior to next notation, then this note serves as note for discharge by case management.    Patient Name: Suzan Garcia                     Date / Time: 1/3/2025  7:28 PM  YOB: 1965  Diagnosis: Paraparesis (HCC) [G82.20]                     Patient Admission Status: REHAB IP   Readmission Risk (Low < 19, Mod (19-27), High > 27): Readmission Risk Score: 14.5      Current PCP: No primary care provider on file.  PCP verified by CM? No (Patient states no PCP in several years)  Chart Reviewed: Yes      History Provided by: Patient  Patient Orientation: Alert and Oriented    Patient Cognition: Alert    Advance Directives:    Code Status: Full Code   Patient's Primary Decision Maker is: Legal Next of Kin      Current Services Prior to Admission:  Current Financial resources: None  Current community resources: None  Current services prior to admission: None  Current Home DME: Walker (possibly donated by PT after last admission to [facility])  Do you have a wheelchair ramp/Plans to build? No  Handicap Placard: Needs    Discharge Planning:  Patient lives with: Spouse/Significant Other, Children   Type of Home: House  Primary Care Giver: Self  Marital Status:   Patient Support Systems include: Spouse/Significant Other, Children, Family Members   Family can provide assistance at DC: Yes  Does patient have 24 hour assistance at home:  No  Is patient agreeable to VNS or Outpatient therapy Yes  [facility] of choice provided: Yes  List of Home Care Agencies/Outpatient therapy provided: No  Type of Home Care services anticipated in preparation for discharge:  PT, OT  VNS/Outpatient therapy chosen: No    Does patient go to outpatient dialysis: No  If yes, location and chair time: n/a    Would you

## 2025-01-07 NOTE — PLAN OF CARE
Problem: Discharge Planning  Goal: Discharge to home or other facility with appropriate resources  1/7/2025 1350 by Lizzette Nava RN  Outcome: Progressing  1/7/2025 0044 by Jose M Morelos RN  Outcome: Progressing     Problem: Safety - Adult  Goal: Free from fall injury  1/7/2025 1350 by Lizzette Nava RN  Outcome: Progressing  1/7/2025 0044 by Jose M Morelos RN  Outcome: Progressing     Problem: Skin/Tissue Integrity  Goal: Absence of new skin breakdown  1/7/2025 1350 by Lizzette Nava RN  Outcome: Progressing  1/7/2025 0044 by Jose M Morelos RN  Outcome: Progressing     Problem: Pain  Goal: Verbalizes/displays adequate comfort level or baseline comfort level  1/7/2025 1350 by Lizzette Nava RN  Outcome: Progressing  1/7/2025 0044 by Jose M Morelos RN  Outcome: Progressing

## 2025-01-07 NOTE — PROGRESS NOTES
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 right-handed female with history of HTN admitted to [facility] on 2024.       She initially presented with left facial droop and bilateral lower limb weakness for 1 day.  She had been recently admitted to [facility] with cholelithiasis and thoracic and abdominal lymphadenopathy.  Upon the current presentation, MRI brain showed no acute intracranial abnormality.  MRI of the spine showed no acute abnormalities.  Lumbar puncture was done on 24.  She is currently receiving a 5-day course of IVIG for Guillain-White Stone syndrome (last dose 25).     She reports ongoing bilateral lower limb weakness, right greater than left.  She also notes numbness/tingling in the bilateral lower limbs.  She states that she continues to have some weakness in the left side of her face along with speech difficulty.  She reports blurred vision as well.  per neuro MD 1-3: Bilateral, R>L, leg weakness and left CN VII palsy. In a patient with CSF albuminocytologic dissociation, consistent with Guillain-Barré syndrome.  Somewhat atypical given asymmetric findings, although similar cases have previously been reported in literature. GM AB panel negative. Symptoms improved with IVIG.    Treatment Diagnosis: Impaired self-care status due to GB with BLE weakness affecting mobility.    Past Medical History:  has a past medical history of Chronic back pain.    Past Surgical History:   has a past surgical history that includes  section and IR BIOPSY LYMPH NODE SUPERFICIAL

## 2025-01-07 NOTE — PLAN OF CARE
Problem: Discharge Planning  Goal: Discharge to home or other facility with appropriate resources  1/7/2025 0044 by Jose M Morelos, RN  Outcome: Progressing     Problem: Safety - Adult  Goal: Free from fall injury  1/7/2025 0044 by Jose M Morelos, RN  Outcome: Progressing     Problem: Skin/Tissue Integrity  Goal: Absence of new skin breakdown  Description: 1.  Monitor for areas of redness and/or skin breakdown  2.  Assess vascular access sites hourly  3.  Every 4-6 hours minimum:  Change oxygen saturation probe site  4.  Every 4-6 hours:  If on nasal continuous positive airway pressure, respiratory therapy assess nares and determine need for appliance change or resting period.  1/7/2025 0044 by Jose M Morelos, RN  Outcome: Progressing     Problem: Pain  Goal: Verbalizes/displays adequate comfort level or baseline comfort level  1/7/2025 0044 by Jose M Morelos, RN  Outcome: Progressing

## 2025-01-07 NOTE — PROGRESS NOTES
Physical Medicine & Rehabilitation  Progress Note        Subjective:      59-year-old female with bilateral lower extremity weakness secondary to Guillain-Barré syndrome.  Patient seen and examined working with therapy.  She reports some worsening paresthesias RLE that impacted sleep, as well as mid back pain radiating laterally around abdomen.      ROS:  Denies fevers, chills, sweats.  No chest pain, palpitations, lightheadedness.  Denies coughing, wheezing or shortness of breath.  Denies abdominal pain, nausea, diarrhea or constipation.  No new areas of joint pain.  Denies new areas of numbness or weakness.  Denies new anxiety or depression issues.  No new skin problems.    Rehabilitation:       PT:    Bed mobility  Rolling to Left: Contact guard assistance  Rolling to Right: Stand by assistance  Supine to Sit: Stand by assistance  Sit to Supine: Stand by assistance  Scooting: Stand by assistance  Bed Mobility Comments: HOB flat, use of bed rails         Transfers  Sit to Stand: Contact guard assistance (cues for hand placement 50% of time)  Stand to Sit: Contact guard assistance (cues for hand placement)  Bed to Chair: Contact guard assistance (SPT no device)  Stand Pivot Transfers: Contact guard assistance (Cues for safe hand palcement. Improved sequencing with stand pivots this date)  Comment: Transfers completed with RW unless noted otherwise. Requires safety cues for hand placement         Ambulation  Surface: Level tile, Ramp  Device: Rolling Walker  Other Apparatus: Wheelchair follow  Assistance: Contact guard assistance  Quality of Gait: Varied step length, NBOS- heels almost touching and RLE nearly crossing midline. downward gaze- corrects with cues but reverts back and forth due to neck pain with forward gaze, slowed jyoti with fatigue  Gait Deviations: Slow Jyoti, Decreased step length, Decreased step height (NBOS)  Distance: 144' and 135' in AM, 65ft x2 in PM  Comments: Pt requested seated rest

## 2025-01-07 NOTE — PROGRESS NOTES
SPEECH LANGUAGE PATHOLOGY  Speech Language Pathology  [facility] ACUTE REHAB    Cognitive Treatment Note    Date: 2025  Patient’s Name: Suzan Garcia  MRN: [id number]  Diagnosis:   Patient Active Problem List   Diagnosis Code    ASCUS with positive high risk HPV cervical R87.610, R87.810    Screening mammogram for breast cancer Z12.31    Sleep disturbance G47.9    Cholelithiasis without cholangitis K80.20    Biliary colic K80.50    Cervical lymphadenopathy R59.0    Lymphadenopathy, generalized R59.1    Thrombocytopenia (HCC) D69.6    Leg weakness, bilateral R29.898    Bell's palsy G51.0    Guillain Barré syndrome (HCC) G61.0    Paraparesis ([facility]) G82.20       Pain Ratin/10  Pain Location: jaw (L)  Non-Pharmaceutical Pain Intervention: ice    Cognitive/Speech Treatment    Treatment time: 3836-8428      Subjective: [x] Alert [x] Cooperative     [] Confused     [] Agitated    [] Lethargic      Objective/Assessment:    Recall: Required mod-max A to recall morning therapy activities but generated minimal level of detail.     Organization: Yes/no object comparison questions: 80% (I) improved to 100% with repeated stimuli.     Problem Solving/Reasoning: Category labelin% (I). Pt demo decreased carryover of task directions, requiring multiple repeated instructions and max A to improve accuracy to 60%.    Dysarthria: ST instructed Pt to complete facial exercises targeting labial strength and ROM. Pt. Completed exercises x1 sets x10 reps each. Pt reported discomfort; ST discontinued exercises    Other:  via language services used t/o session. No family present. Pt in recliner with chair alarm set and call light within reach at end of session.    Plan:  [x] Continue ST services    [] Discharge from ST:      Discharge recommendations:  [x] Further therapy recommended at discharge.   [] No therapy recommended at discharge.      Treatment completed by: Suresh Mike M.S., CCC-SLP

## 2025-01-08 LAB — GLUCOSE BLD-MCNC: 125 MG/DL (ref 65–105)

## 2025-01-08 PROCEDURE — 97535 SELF CARE MNGMENT TRAINING: CPT

## 2025-01-08 PROCEDURE — 6370000000 HC RX 637 (ALT 250 FOR IP)

## 2025-01-08 PROCEDURE — 92507 TX SP LANG VOICE COMM INDIV: CPT

## 2025-01-08 PROCEDURE — 82947 ASSAY GLUCOSE BLOOD QUANT: CPT

## 2025-01-08 PROCEDURE — 97530 THERAPEUTIC ACTIVITIES: CPT

## 2025-01-08 PROCEDURE — 6370000000 HC RX 637 (ALT 250 FOR IP): Performed by: STUDENT IN AN ORGANIZED HEALTH CARE EDUCATION/TRAINING PROGRAM

## 2025-01-08 PROCEDURE — 97110 THERAPEUTIC EXERCISES: CPT

## 2025-01-08 PROCEDURE — 97129 THER IVNTJ 1ST 15 MIN: CPT

## 2025-01-08 PROCEDURE — 6360000002 HC RX W HCPCS

## 2025-01-08 PROCEDURE — 99232 SBSQ HOSP IP/OBS MODERATE 35: CPT | Performed by: INTERNAL MEDICINE

## 2025-01-08 PROCEDURE — 1180000000 HC REHAB R&B

## 2025-01-08 PROCEDURE — 99232 SBSQ HOSP IP/OBS MODERATE 35: CPT | Performed by: GENERAL PRACTICE

## 2025-01-08 PROCEDURE — 97116 GAIT TRAINING THERAPY: CPT

## 2025-01-08 PROCEDURE — 6370000000 HC RX 637 (ALT 250 FOR IP): Performed by: GENERAL PRACTICE

## 2025-01-08 RX ADMIN — CARBOXYMETHYLCELLULOSE SODIUM 1 DROP: 5 SOLUTION/ DROPS OPHTHALMIC at 17:19

## 2025-01-08 RX ADMIN — ACETAMINOPHEN 650 MG: 325 TABLET ORAL at 12:50

## 2025-01-08 RX ADMIN — ACETAMINOPHEN 650 MG: 325 TABLET ORAL at 05:53

## 2025-01-08 RX ADMIN — GABAPENTIN 400 MG: 400 CAPSULE ORAL at 07:29

## 2025-01-08 RX ADMIN — GABAPENTIN 400 MG: 400 CAPSULE ORAL at 12:50

## 2025-01-08 RX ADMIN — Medication 6 MG: at 20:27

## 2025-01-08 RX ADMIN — GABAPENTIN 600 MG: 300 CAPSULE ORAL at 20:28

## 2025-01-08 RX ADMIN — ENOXAPARIN SODIUM 40 MG: 100 INJECTION SUBCUTANEOUS at 07:29

## 2025-01-08 RX ADMIN — TIZANIDINE 4 MG: 4 TABLET ORAL at 19:34

## 2025-01-08 RX ADMIN — CARBOXYMETHYLCELLULOSE SODIUM 1 DROP: 5 SOLUTION/ DROPS OPHTHALMIC at 07:29

## 2025-01-08 RX ADMIN — CARBOXYMETHYLCELLULOSE SODIUM 1 DROP: 5 SOLUTION/ DROPS OPHTHALMIC at 12:51

## 2025-01-08 RX ADMIN — ROSUVASTATIN 40 MG: 40 TABLET, FILM COATED ORAL at 20:28

## 2025-01-08 RX ADMIN — AMLODIPINE BESYLATE 2.5 MG: 2.5 TABLET ORAL at 07:29

## 2025-01-08 RX ADMIN — ACETAMINOPHEN 650 MG: 325 TABLET ORAL at 20:28

## 2025-01-08 RX ADMIN — ACETAMINOPHEN 650 MG: 325 TABLET ORAL at 17:22

## 2025-01-08 RX ADMIN — CARBOXYMETHYLCELLULOSE SODIUM 1 DROP: 5 SOLUTION/ DROPS OPHTHALMIC at 20:28

## 2025-01-08 ASSESSMENT — PAIN - FUNCTIONAL ASSESSMENT
PAIN_FUNCTIONAL_ASSESSMENT: ACTIVITIES ARE NOT PREVENTED

## 2025-01-08 ASSESSMENT — PAIN DESCRIPTION - ORIENTATION
ORIENTATION: RIGHT;LEFT
ORIENTATION: INNER

## 2025-01-08 ASSESSMENT — PAIN DESCRIPTION - LOCATION
LOCATION: ABDOMEN
LOCATION: LEG
LOCATION: HEAD

## 2025-01-08 ASSESSMENT — PAIN SCALES - GENERAL
PAINLEVEL_OUTOF10: 0
PAINLEVEL_OUTOF10: 0
PAINLEVEL_OUTOF10: 5
PAINLEVEL_OUTOF10: 8
PAINLEVEL_OUTOF10: 5
PAINLEVEL_OUTOF10: 4

## 2025-01-08 ASSESSMENT — PAIN DESCRIPTION - DESCRIPTORS
DESCRIPTORS: ACHING
DESCRIPTORS: TINGLING
DESCRIPTORS: ACHING

## 2025-01-08 NOTE — PLAN OF CARE
Problem: Discharge Planning  Goal: Discharge to home or other facility with appropriate resources  1/8/2025 0027 by Jose M Morelos, RN  Outcome: Progressing     Problem: Safety - Adult  Goal: Free from fall injury  1/8/2025 0027 by Jose M Morelos, RN  Outcome: Progressing     Problem: Skin/Tissue Integrity  Goal: Absence of new skin breakdown  Description: 1.  Monitor for areas of redness and/or skin breakdown  2.  Assess vascular access sites hourly  3.  Every 4-6 hours minimum:  Change oxygen saturation probe site  4.  Every 4-6 hours:  If on nasal continuous positive airway pressure, respiratory therapy assess nares and determine need for appliance change or resting period.  1/8/2025 0027 by Jose M Morelos, RN  Outcome: Progressing     Problem: Pain  Goal: Verbalizes/displays adequate comfort level or baseline comfort level  1/8/2025 0027 by Jose M Morelos, RN  Outcome: Progressing     Problem: ABCDS Injury Assessment  Goal: Absence of physical injury  Outcome: Progressing

## 2025-01-08 NOTE — PROGRESS NOTES
[facility]   IN-PATIENT SERVICE   [facility]    Consult note            Date:   2025  Patient name:  Suzan Garcia  Date of admission:  1/3/2025  7:28 PM  MRN:   [id number]  Account:  [id number]  YOB: 1965  PCP:    No primary care provider on file.  Room:   [address] [place] [address]  Code Status:    Full Code    Chief Complaint:     No chief complaint on file.      History Obtained From:     patient    History of Present Illness:     The patient is a 59 y.o.   /  female who presents with No chief complaint on file.   and she is admitted to the hospital for the management of    Patient is 59-year-old female who initially came to the hospital with left-sided facial droop and bilateral lower extremity weakness, seen by neurology, head CT CTA was negative,MRI brain showed no acute intracranial abnormalities, except for some microvascular changes.MRI cervical spine showed mild spinal canal stenosis at C6-C7 and T1-T2. MRI thoracic spine showed mild spinal stenosis at T8-T9 secondary to posterior disc protrusion. MRI lumbar spine showed central disc protrusion at L4-L5, moderately narrows the spinal canal. Posterior disc bulge at L3-L4 and mild spinal canal stenosis. Mild bilateral neural foraminal narrowing at L3-L4 and L4-L5.  LP consistent with albumin cytologic dissociation, seen by neurology, started on IVIG, completed 5  Currently admitted at [facility] for further   was used during encounter    Past Medical History:     Past Medical History:   Diagnosis Date    Chronic back pain         Past Surgical History:     Past Surgical History:   Procedure Laterality Date     SECTION      IR BIOPSY LYMPH NODE SUPERFICIAL  2024    IR BIOPSY LYMPH NODE SUPERFICIAL 2024 [facility] SPECIAL PROCEDURES        Medications Prior to Admission:     Prior to Admission medications    Medication Sig Start Date End Date Taking? Authorizing Provider    HEMATOLOGIC/LYMPHATIC:  negative for swelling/edema   ALLERGIC/IMMUNOLOGIC:  negative for urticaria , itching  ENDOCRINE:  negative increase in drinking, increase in urination, hot or cold intolerance  MUSCULOSKELETAL:  negative joint pains, muscle aches, swelling of joints  NEUROLOGICAL: Refer weakness  BEHAVIOR/PSYCH:  negative for depression, anxiety    Physical Exam:   BP (!) 154/88   Pulse 71   Temp 97.2 °F (36.2 °C) (Oral)   Resp 18   Ht 1.54 m (5' 0.63\")   Wt 59.7 kg (131 lb 9.8 oz)   LMP 10/13/2015 (Approximate) Comment: possible menopause  SpO2 100%   BMI 25.17 kg/m²   Temp (24hrs), Av.7 °F (36.5 °C), Min:97.2 °F (36.2 °C), Max:98.1 °F (36.7 °C)    Recent Labs     25  1116 25  1615   POCGLU 95 106*     No intake or output data in the 24 hours ending 25 1426      General Appearance:  alert, well appearing, and in no acute distress  Mental status: oriented to person, place, and time with normal affect  Head:  normocephalic, atraumatic.  Eye: no icterus, redness, pupils equal and reactive, extraocular eye movements intact, conjunctiva clear  Ear: normal external ear, no discharge, hearing intact  Nose:  no drainage noted  Mouth: mucous membranes moist  Neck: supple, no carotid bruits, thyroid not palpable  Lungs: Bilateral equal air entry, clear to ausculation, no wheezing, rales or rhonchi, normal effort  Cardiovascular: normal rate, regular rhythm, no murmur, gallop, rub.  Abdomen: Soft, nontender, nondistended, normal bowel sounds, no hepatomegaly or splenomegaly  Neurologic: Strength right lower extremity 2/5, right upper extremity 5/5, left upper extremity 4/5 skin: No gross lesions, rashes, bruising or bleeding on exposed skin area  Extremities:  peripheral pulses palpable, no pedal edema or calf pain with palpation      Investigations:      Laboratory Testing:  No results found for this or any previous visit (from the past 24  hour(s)).      Imaging/Diagnostics:        Assessment :      Primary Problem  Paraparesis (HCC)    Active Hospital Problems    Diagnosis Date Noted    Paraparesis (HCC) [G82.20] 01/03/2025       Plan:     Bilateral lower extremity weakness right much more than left and facial droop secondary to Guillain-Barré patient completed IVIG, continues to have weakness    Hypertension, blood pressure stable    Cervical adenopathy recently had biopsy negative for malignancy  Labs reviewed  Vital stable DVT prophy    1/7  Patient seen and examined  Labs, radiology, vitals reviewed blood  Patient feels leg weakness is improving  Blood pressure running little high,  Medications reviewed,  Low-salt diet  Denies any chest pain shortness of breath  Working with physical therapy    Consultations:   IP CONSULT TO DIETITIAN  IP CONSULT TO SOCIAL WORK  IP CONSULT TO INTERNAL MEDICINE      Baljit Ross MD  1/8/2025  2:26 PM    Copy sent to Dr. Raymond primary care provider on file.    Please note that this chart was generated using voice recognition Dragon dictation software.  Although every effort was made to ensure the accuracy of this automated transcription, some errors in transcription may have occurred.

## 2025-01-08 NOTE — PROGRESS NOTES
Speech Language Pathology  [facility] ACUTE REHAB    Cognitive Treatment Note    Date: 1/8/2025  Patient’s Name: Suzan Garcia  MRN: [id number]  Diagnosis:   Patient Active Problem List   Diagnosis Code    ASCUS with positive high risk HPV cervical R87.610, R87.810    Screening mammogram for breast cancer Z12.31    Sleep disturbance G47.9    Cholelithiasis without cholangitis K80.20    Biliary colic K80.50    Cervical lymphadenopathy R59.0    Lymphadenopathy, generalized R59.1    Thrombocytopenia (HCC) D69.6    Leg weakness, bilateral R29.898    Bell's palsy G51.0    Guillain Barré syndrome (HCC) G61.0    Paraparesis ([facility]) G82.20       Pain Rating: None reported    Cognitive Treatment    Treatment time: 2345-1427      Subjective: [x] Alert [x] Cooperative     [] Confused     [] Agitated    [] Lethargic      Objective/Assessment:    Recall: Appt info: 6/7, did not increase despite max cues. Pt. States her daughter tracks appts for her. Pt. Reports her daughter writes down appt information on post-it note and puts it on the refrigerator.     Problem Solving/Reasoning: Missing equipment: 2/2.    Speech: Pt. Continues to report dysarthria, stating her speech \"feels slurred and fast\". Verbal education provided re: dysarthria compensatory strategies (decreased rate, over-enunciation, increased volume) with pt. Receptive to education and verbalizing understanding.    ST instructed pt. To complete facial exercises targeting labial strength and ROM. Pt. Completed exercises x4 sets x15 reps each. Pt. Reports she has been completing exercises independently t/o the day. ST reinforcing completion of exercises.    Other:   via language services used t/o session. No family present. Call light within reach at end of session.     Plan:  [x] Continue ST services    [] Discharge from ST:      Discharge recommendations:  [x] Further therapy recommended at discharge.   [] No therapy recommended at discharge.    Treatment

## 2025-01-08 NOTE — PROGRESS NOTES
[facility]   Acute Rehabilitation Occupational Therapy Daily Treatment Note    Date: 25  Patient Name: Suzan Garcia       Room: 2641/2641-01  MRN: [id number]  Account: [id number]   : 1965  (59 y.o.) Gender: female          Diagnosis: Guillain [place] Syndrome with paraparesis , cranial nerve VII palsy, Dysphagia  Additional Pertinent Hx: Suzan Garcia is a 59 y.o. right-handed female with history of HTN admitted to [facility] on 2024.       She initially presented with left facial droop and bilateral lower limb weakness for 1 day.  She had been recently admitted to [facility] with cholelithiasis and thoracic and abdominal lymphadenopathy.  Upon the current presentation, MRI brain showed no acute intracranial abnormality.  MRI of the spine showed no acute abnormalities.  Lumbar puncture was done on 24.  She is currently receiving a 5-day course of IVIG for Guillain-Pekin syndrome (last dose 25).     She reports ongoing bilateral lower limb weakness, right greater than left.  She also notes numbness/tingling in the bilateral lower limbs.  She states that she continues to have some weakness in the left side of her face along with speech difficulty.  She reports blurred vision as well.  per neuro MD 1-3: Bilateral, R>L, leg weakness and left CN VII palsy. In a patient with CSF albuminocytologic dissociation, consistent with Guillain-Barré syndrome.  Somewhat atypical given asymmetric findings, although similar cases have previously been reported in literature. GM AB panel negative. Symptoms improved with IVIG.    Treatment Diagnosis: Impaired self-care status due to GB with BLE weakness affecting mobility.    Past Medical History:  has a past medical history of Chronic back pain.    Past Surgical History:   has a past surgical history that includes  section and IR BIOPSY LYMPH NODE SUPERFICIAL

## 2025-01-08 NOTE — PROGRESS NOTES
Physical Therapy  Facility/Department: [facility] ACUTE REHAB  Rehabilitation Physical Therapy Progress Note.  NAME: Suzan Garcia  : 1965 (59 y.o.)  MRN: [id number]  CODE STATUS: Full Code    Date of Service: 25      Past Medical History:   Diagnosis Date    Chronic back pain      Past Surgical History:   Procedure Laterality Date     SECTION      IR BIOPSY LYMPH NODE SUPERFICIAL  2024    IR BIOPSY LYMPH NODE SUPERFICIAL 2024 [facility] SPECIAL PROCEDURES       Referring Practitioner: Dr Montoya  Referral Date : 25  Diagnosis: Paraperesis  General  General Comments: difficulty c  in a.m. unable to hear . p.m. Session ID: [id number]  Language: Sudanese   ID: #[id number]   Name: Brandon    Restrictions:  Restrictions/Precautions: Fall Risk;General Precautions     SUBJECTIVE  Subjective: pleasant and agreeable to PT.  No pain noted by patient only stating she was sore and feeling weak in (B) LE's       OBJECTIVE  Functional Mobility  Bed mobility  Rolling to Left: Stand by assistance  Rolling to Right: Stand by assistance  Supine to Sit: Supervision  Sit to Supine: Stand by assistance  Scooting: Stand by assistance  Bed Mobility Comments: HOB flat no bed rail.    Transfers  Sit to Stand: Contact guard assistance  Stand to Sit: Contact guard assistance  Stand Pivot Transfers: Contact guard assistance (recliner<>w/c<>bed)    Environmental Mobility    Surface: Level tile  Device: Rolling Walker  Other Apparatus: Wheelchair follow  Assistance: Contact guard assistance  Quality of Gait: NBOS, good reciprocal gait pattern, downward gaze  Gait Deviations: Decreased step height;Decreased step length (impulsive at times)  Distance: 65 ft/pm 100 ft x 2    PT Exercises  A/AROM Exercises: (B) LE supine exercise x 10 hip abd/add, heel slides c MIN A, SLR MOD A, quad sets (5 sec hold) SBA  Resistive Exercises: R LE 1.5 # LAQ, Marching SBA a lot of tactile education

## 2025-01-08 NOTE — PROGRESS NOTES
Physical Medicine & Rehabilitation  Progress Note        Subjective:      59-year-old female with bilateral lower extremity weakness secondary to Guillain-Barré syndrome.  Patient seen and examined resting comfortably in her bedside chair. She reports she slept better last night, though reports slight headache. Continues to endorse bilateral lower extremity paresthesias as well as feeling of discomfort in her abdomen, though no pain or nausea. Feels increasing gabapentin has provided benefit. Nursing expressed concern patient may be feeling anxious, which patient denies, just stating that the paresthesias in her abdomen are uncomfortable. Overall is tolerating therapy.       ROS:  Denies fevers, chills, sweats.  No chest pain, palpitations, lightheadedness.  Denies coughing, wheezing or shortness of breath.  Denies abdominal pain, nausea, diarrhea or constipation.  No new areas of joint pain.  Denies new areas of numbness or weakness.  Denies new anxiety or depression issues.  No new skin problems.    Rehabilitation:       PT:    Bed mobility  Rolling to Left: Stand by assistance  Rolling to Right: Stand by assistance  Supine to Sit: Supervision  Sit to Supine: Stand by assistance  Scooting: Stand by assistance  Bed Mobility Comments: HOB flat no bed rail.         Transfers  Sit to Stand: Contact guard assistance  Stand to Sit: Contact guard assistance  Bed to Chair: Contact guard assistance  Stand Pivot Transfers: Contact guard assistance (recliner<>w/c<>bed)  Comment: RW         Ambulation  Surface: Level tile  Device: Rolling Walker  Other Apparatus: Wheelchair follow  Assistance: Contact guard assistance  Quality of Gait: NBOS, good reciprocal gait pattern, downward gaze  Gait Deviations: Decreased step height, Decreased step length (impulsive at times)  Distance: 65 ft,/pm 100 ft x 2  Comments: Seated rest at end of each distance.       OT:  Grooming/Oral Hygiene  Assistance Level: Contact guard assist  Skilled  provided re: dysarthria compensatory strategies (decreased rate, over-enunciation, increased volume) with pt. Receptive to education and verbalizing understanding.     ST instructed pt. To complete facial exercises targeting labial strength and ROM. Pt. Completed exercises x4 sets x15 reps each. Pt. Reports she has been completing exercises independently t/o the day. ST reinforcing completion of exercises.     Other:   via language services used t/o session. No family present. Call light within reach at end of session.      Plan:  [x] Continue ST services    [] Discharge from ST:       Discharge recommendations:  [x] Further therapy recommended at discharge.   [] No therapy recommended at discharge.      Objective:  BP (!) 154/88   Pulse 71   Temp 97.2 °F (36.2 °C) (Oral)   Resp 18   Ht 1.54 m (5' 0.63\")   Wt 59.7 kg (131 lb 9.8 oz)   LMP 10/13/2015 (Approximate) Comment: possible menopause  SpO2 100%   BMI 25.17 kg/m²       GEN: Well developed, well nourished, no acute distress  HEENT: Normocephalic, atraumatic.  EOM grossly intact.  Hearing grossly intact.  Mucous membranes pink and moist.  RESP: Respirations WNL and unlabored.  Breathing comfortably.  CV: Regular rate and rhythm.  Palpable pulses and well-perfused extremities.  ABD: Soft, non-distended  NEURO: Alert.  Speech fluent.  Sensation to light touch intact.  MSK: Muscle bulk is normal bilaterally. Strength 4+/5 in the bilateral upper limbs.  Strength 4/5 key muscles LLE.  Strength 3+/5 key muscles RLE  LIMBS: No edema in bilateral lower limbs.  SKIN: Warm and dry with good turgor.  PSYCH: Mood WNL. Affect WNL. Appropriately interactive     Diagnostics:     CBC:   No results for input(s): \"WBC\", \"RBC\", \"HGB\", \"HCT\", \"MCV\", \"RDW\", \"PLT\" in the last 72 hours.    BMP:   No results for input(s): \"NA\", \"K\", \"CL\", \"CO2\", \"PHOS\", \"BUN\", \"CREATININE\", \"GLUCOSE\" in the last 72 hours.    Invalid input(s): \"CA\"    BNP: No results for input(s):

## 2025-01-09 PROCEDURE — 6360000002 HC RX W HCPCS

## 2025-01-09 PROCEDURE — 99232 SBSQ HOSP IP/OBS MODERATE 35: CPT | Performed by: GENERAL PRACTICE

## 2025-01-09 PROCEDURE — 97116 GAIT TRAINING THERAPY: CPT

## 2025-01-09 PROCEDURE — 6370000000 HC RX 637 (ALT 250 FOR IP)

## 2025-01-09 PROCEDURE — 97530 THERAPEUTIC ACTIVITIES: CPT

## 2025-01-09 PROCEDURE — 6370000000 HC RX 637 (ALT 250 FOR IP): Performed by: GENERAL PRACTICE

## 2025-01-09 PROCEDURE — 97110 THERAPEUTIC EXERCISES: CPT

## 2025-01-09 PROCEDURE — 97542 WHEELCHAIR MNGMENT TRAINING: CPT

## 2025-01-09 PROCEDURE — 92507 TX SP LANG VOICE COMM INDIV: CPT

## 2025-01-09 PROCEDURE — 97129 THER IVNTJ 1ST 15 MIN: CPT

## 2025-01-09 PROCEDURE — 99232 SBSQ HOSP IP/OBS MODERATE 35: CPT | Performed by: INTERNAL MEDICINE

## 2025-01-09 PROCEDURE — 1180000000 HC REHAB R&B

## 2025-01-09 PROCEDURE — 97535 SELF CARE MNGMENT TRAINING: CPT

## 2025-01-09 RX ADMIN — CARBOXYMETHYLCELLULOSE SODIUM 1 DROP: 5 SOLUTION/ DROPS OPHTHALMIC at 07:31

## 2025-01-09 RX ADMIN — TIZANIDINE 4 MG: 4 TABLET ORAL at 05:20

## 2025-01-09 RX ADMIN — GABAPENTIN 400 MG: 400 CAPSULE ORAL at 12:28

## 2025-01-09 RX ADMIN — GABAPENTIN 600 MG: 300 CAPSULE ORAL at 20:20

## 2025-01-09 RX ADMIN — AMLODIPINE BESYLATE 2.5 MG: 2.5 TABLET ORAL at 07:31

## 2025-01-09 RX ADMIN — ENOXAPARIN SODIUM 40 MG: 100 INJECTION SUBCUTANEOUS at 07:31

## 2025-01-09 RX ADMIN — ROSUVASTATIN 40 MG: 40 TABLET, FILM COATED ORAL at 20:21

## 2025-01-09 RX ADMIN — ACETAMINOPHEN 650 MG: 325 TABLET ORAL at 20:21

## 2025-01-09 RX ADMIN — GABAPENTIN 400 MG: 400 CAPSULE ORAL at 07:31

## 2025-01-09 RX ADMIN — TIZANIDINE 4 MG: 4 TABLET ORAL at 20:21

## 2025-01-09 RX ADMIN — Medication 6 MG: at 20:20

## 2025-01-09 RX ADMIN — CARBOXYMETHYLCELLULOSE SODIUM 1 DROP: 5 SOLUTION/ DROPS OPHTHALMIC at 17:27

## 2025-01-09 RX ADMIN — ACETAMINOPHEN 650 MG: 325 TABLET ORAL at 05:16

## 2025-01-09 RX ADMIN — CARBOXYMETHYLCELLULOSE SODIUM 1 DROP: 5 SOLUTION/ DROPS OPHTHALMIC at 20:22

## 2025-01-09 RX ADMIN — ACETAMINOPHEN 650 MG: 325 TABLET ORAL at 12:28

## 2025-01-09 RX ADMIN — CARBOXYMETHYLCELLULOSE SODIUM 1 DROP: 5 SOLUTION/ DROPS OPHTHALMIC at 12:28

## 2025-01-09 ASSESSMENT — PAIN DESCRIPTION - DESCRIPTORS
DESCRIPTORS: ACHING
DESCRIPTORS: ACHING

## 2025-01-09 ASSESSMENT — PAIN DESCRIPTION - LOCATION
LOCATION: ABDOMEN
LOCATION: ABDOMEN;LEG

## 2025-01-09 ASSESSMENT — PAIN DESCRIPTION - ORIENTATION
ORIENTATION: INNER
ORIENTATION: RIGHT;LEFT;INNER

## 2025-01-09 ASSESSMENT — PAIN SCALES - GENERAL
PAINLEVEL_OUTOF10: 0
PAINLEVEL_OUTOF10: 4
PAINLEVEL_OUTOF10: 4
PAINLEVEL_OUTOF10: 8
PAINLEVEL_OUTOF10: 8

## 2025-01-09 NOTE — PROGRESS NOTES
Physical Therapy  Facility/Department: [facility] ACUTE REHAB  Treatment note    NAME: Suzan Garcia  : 1965 (59 y.o.)  MRN: [id number]  CODE STATUS: Full Code  Date of Service: 25    Past Medical History:   Diagnosis Date    Chronic back pain      Past Surgical History:   Procedure Laterality Date     SECTION      IR BIOPSY LYMPH NODE SUPERFICIAL  2024    IR BIOPSY LYMPH NODE SUPERFICIAL 2024 [facility] SPECIAL PROCEDURES     Family/Caregiver Present: No  General  General Comments: 2 translators used during session (1 cut off when stand was moved).    Restrictions:  Restrictions/Precautions: Fall Risk;General Precautions     SUBJECTIVE  Subjective: States less twisting of stomach, less B LE numbness; slept slightly better.    OBJECTIVE  Functional Mobility  Transfers  Sit to Stand: Contact guard assistance  Stand to Sit: Contact guard assistance  Stand Pivot Transfers: Contact guard assistance (recliner<>w/c<>bed)  Balance  Posture: Fair  Sitting - Static: Good  Sitting - Dynamic: Good;-  Standing - Static: Fair (RW)  Standing - Dynamic: Fair (RW)    Environmental Mobility  Ambulation  Surface: Level tile  Device: Rolling Walker  Other Apparatus: Wheelchair follow  Assistance: Contact guard assistance  Quality of Gait: Narrow HORTENSIA, G reciprocal gait pattern, downward gaze, kyphotic posture.  Gait Deviations: Decreased step height;Decreased step length  Distance: 110' x2  Comments: G response to postural cues; no response to cues to adapt gaze upward.  Stairs/Curb  Stairs?: Yes  Stairs  # Steps : 5  Stairs Height:  (4\"/6\")  Rails: Bilateral  Assistance: Contact guard assistance  Comment: Tactile cues for step-to sequencing throughout; no buckling noted, but Pt deferred further stairs 2º fatigue.  Wheelchair Activities  Propulsion: Yes  Propulsion 1  Propulsion: Manual  Level: Level Tile  Method: RUE;LUE  Level of Assistance: Supervision  Description/ Details: Reviewed instructions for brakes,  risk.  Long term goal 8: Pt to propel w/c, distance of 150 ft, level surfaces , mod-I to improve strength an endurance.    PLAN OF CARE  Frequency: 1-2 treatment sessions per day, 5-7 days per week  Physical Therapy Plan  General Plan:  minutes of therapy at least 5 out of 7 days a week  Current Treatment Recommendations: Gait training;Stair training;Functional mobility training;Balance training;Transfer training;Therapeutic activities;Safety education & training;Endurance training  Safety Devices  Type of Devices: (P) All fall risk precautions in place;Left in chair;Call light within reach;Nurse notified;Patient at risk for falls;Gait belt;Chair alarm in place;Heels elevated for pressure relief    EDUCATION  Education  Education Given To: Patient  Education Provided: Safety  Education Method: Verbal;  Barriers to Learning:  (language barrier)  Education Outcome: Continued education needed    Therapy Time   Individual Concurrent Group Co-treatment   Time In 0912         Time Out 1042         Minutes 90           Desire Sarkar PTA, 01/09/25 at 4:30 PM

## 2025-01-09 NOTE — PROGRESS NOTES
Physical Medicine & Rehabilitation  Progress Note      Patient interview conducted during:   Session ID: [id number]  Language: Maori   ID: #[id number]   Name: Missael Mejia:      59-year-old female with bilateral lower extremity weakness secondary to Guillain-Barré syndrome. Patient seen and examined working with therapy. Patient reports overall the paresthesias she is feeling in both legs persist but are improving with gabapentin. Continues to have intermittent paresthesias to her abdomen, which is unsettling to her, but also seem to be improving. Has mild headache that improves with Tylenol.       ROS:  Denies fevers, chills, sweats.  No chest pain, palpitations, lightheadedness.  Denies coughing, wheezing or shortness of breath.  Denies abdominal pain, nausea, diarrhea or constipation.  No new areas of joint pain.  Denies new areas of numbness or weakness.  Denies new anxiety or depression issues.  No new skin problems.    Rehabilitation:       PT:    Bed mobility  Rolling to Left: Stand by assistance  Rolling to Right: Stand by assistance  Supine to Sit: Supervision  Sit to Supine: Stand by assistance  Scooting: Stand by assistance  Bed Mobility Comments: HOB flat no bed rail.         Transfers  Sit to Stand: Contact guard assistance  Stand to Sit: Contact guard assistance  Bed to Chair: Contact guard assistance  Stand Pivot Transfers: Contact guard assistance (recliner<>w/c<>bed)  Comment: RW         Ambulation  Surface: Level tile  Device: Rolling Walker  Other Apparatus: Wheelchair follow  Assistance: Contact guard assistance  Quality of Gait: NBOS, good reciprocal gait pattern, downward gaze  Gait Deviations: Decreased step height, Decreased step length (impulsive at times)  Distance: 65 ft,/pm 100 ft x 2  Comments: Seated rest at end of each distance.       OT:  Grooming/Oral Hygiene  Assistance Level: Stand by assist  Skilled Clinical Factors: SBA provided while standing at  Upper  Continue ophthalmic solution and as needed artificial tears  Hypertension: Continue amlodipine. Running high, IM following to titrate medications as indicated. .   Lymphadenopathy: S/p cervical lymph biopsy on 12/23/2024, outpatient follow-up with oncology.  Cholelithiasis: Continue to monitor, follow-up with GI outpatient  Back pain due multilevel spinal stenosis: S/p IV Toradol x 2 doses to complete from acute care.  Increase gabapentin to 3 times daily, start routine Tylenol 1/04.  Increase gabapentin 400 mg TID 1/06. Increase bedtime dose to 600 mg 1/07. Continue lidocaine patch, and as needed Zanaflex.  Insomnia: Increase melatonin, scheduled routinely. Improved.   Bowel Management: Miralax daily, senokot prn, dulcolax prn.  DVT Prophylaxis:  low molecular weight heparin  Internal medicine for medical management  Follow ups: Oncology, neurology      Electronically signed by Jose M Montoya MD on 1/9/2025 at 3:18 PM      This note is created with the assistance of a speech recognition program.  While intending to generate a document that actually reflects the content of the visit, the document can still have some errors including those of syntax and sound a like substitutions which may escape proof reading.  In such instances, actual meaning can be extrapolated by contextual diversion.

## 2025-01-09 NOTE — PLAN OF CARE
Problem: Discharge Planning  Goal: Discharge to home or other facility with appropriate resources  1/9/2025 1040 by Katia Ashley RN  Outcome: Progressing     Problem: Safety - Adult  Goal: Free from fall injury  1/9/2025 1040 by Katia Ashley RN  Outcome: Progressing     Problem: Skin/Tissue Integrity  Goal: Absence of new skin breakdown  Description: 1.  Monitor for areas of redness and/or skin breakdown  2.  Assess vascular access sites hourly  3.  Every 4-6 hours minimum:  Change oxygen saturation probe site  4.  Every 4-6 hours:  If on nasal continuous positive airway pressure, respiratory therapy assess nares and determine need for appliance change or resting period.  1/9/2025 1040 by Katia Ashley RN  Outcome: Progressing     Problem: Pain  Goal: Verbalizes/displays adequate comfort level or baseline comfort level  1/9/2025 1040 by Katia Ashley, RN  Outcome: Progressing     Problem: ABCDS Injury Assessment  Goal: Absence of physical injury  1/9/2025 1040 by Katia Ashley, RN  Outcome: Progressing

## 2025-01-09 NOTE — PROGRESS NOTES
(12/23/2024).    Restrictions  Restrictions/Precautions  Restrictions/Precautions: Fall Risk, General Precautions  Activity Level: Up as Tolerated, Up with Assist  Required Braces or Orthoses?: No     Vitals      Subjective  Subjective  Subjective: AM: Pt in bed upon arrival. Awake. Requesting to take a shower this morning. PM: Pt in recliner chair in room upon arrival. Pleasant and agreeable to participate.  Pain: No c/o pain. Reported having a BM this morning.     Objective  Cognition  Overall Orientation Status: Within Functional Limits  Orientation Level: Oriented X4    Cognition  Overall Cognitive Status: WFL    Activities of Daily Living  Feeding  Assistance Level: Independent  Skilled Clinical Factors: Per pt report.    Grooming/Oral Hygiene  Assistance Level: Stand by assist  Skilled Clinical Factors: SBA provided while standing at    Upper Extremity Bathing  Assistance Level: Supervision  Skilled Clinical Factors: Seated on shower bench.    Lower Extremity Bathing  Assistance Level: Contact guard assist  Skilled Clinical Factors: CGA/SBA while standing for ruth hygiene and washing of buttocks in shower. Otherwise SUP for all seated activity on shower bench.    Upper Extremity Dressing  Assistance Level: Set-up  Skilled Clinical Factors: Pt is able to doff/zulma overhead clothing and open sweater without difficulty.    Lower Extremity Dressing  Assistance Level: Contact guard assist  Skilled Clinical Factors: CGA/SBA.    Putting On/Taking Off Footwear  Assistance Level: Minimal assistance  Skilled Clinical Factors: Assist with TEDs. Able to doff and zulma personal socks and gripper socks utilizing figure 4 technique. Able to zulma shoes as well without assist.    Toileting  Assistance Level: Contact guard assist  Skilled Clinical Factors: CGA/SBA. Completed during AM and PM sessions. Use of rw to maintain balance during clothing management. performs hygiene while seated.    Toilet Transfers  Technique:   (Ambulating with rw.)  Equipment: Standard toilet;Grab bars  Assistance Level: Contact guard assist  Skilled Clinical Factors: CGA/SBA. Completed during AM and PM sessions.    Tub/Shower Transfers  Type: Shower  Transfer From: Walker  Transfer To: Tub transfer bench  Assistance Level: Contact guard assist    Mobility     Sit to Supine  Assistance Level: Modified independent  Supine to Sit  Assistance Level: Modified independent     Sit to Stand  Assistance Level: Contact guard assist  Skilled Clinical Factors: CGA/SBA. Verbal cuing for hand placement.  Stand to Sit  Assistance Level: Contact guard assist  Skilled Clinical Factors: Verbal cuing for hand placement.    Functional Mobility  Device: Rolling walker  Activity: To/From bathroom;Retrieve items;Transport items  Assistance Level: Contact guard assist  Skilled Clinical Factors: CGA/SBA during mobility with rw. Slow steady pace. No LOB noted. PM: Writer assisted pt in participation of short distances of mobility without device with CGA/Sol to further challenging patients balance and coordination. Little unsteady but no LOB. Pt reports feeling very weak and not in control of her body without device at this time.    OT Exercises  Exercise Treatment: Pt engaged in BUE exercises with 2# free weight in all planes 10-20 reps depending on plane, to increase strength and endurance for ADL and IADL activities. Some exercises completed while seated, some while standing with unilateral support of rw. Rest breaks taken as needed.    Assessment  Assessment  Assessment: Pt is pleasant and cooperative  Activity Tolerance: Patient limited by endurance;Patient limited by fatigue  Discharge Recommendations: Continue to assess pending progress;24 hour supervision or assist    Patient Education  Education  Education Given To: Patient  Education Provided: Fall Prevention Strategies;Energy Conservation;Safety;ADL Function;Mobility Training;Transfer Training  Education Method:  Verbal  Barriers to Learning: None  Education Outcome: Verbalized understanding    Safety Devices  Safety Devices in place: Yes  Type of devices: All fall risk precautions in place     Family education  To be arranged    Goals  Patient Goals   Patient goals : \"go home,  do things on my own, not depend on anybody.\"  Short Term Goals  Time Frame for Short Term Goals: 1 week  Short Term Goal 1: Pt will complete LB ADLs with supervision, Good safety, and use of AE/modified techniques as needed  Short Term Goal 2: Pt will complete functional transfers with SBA, Good safety, and least restrictive device  Short Term Goal 3: Pt will tolerate dynamic/static standing/ ambulating with least restrictive device and SBA for 7+ mins during therapeutic exercise/functional activity for improved activity tolerance  Short Term Goal 4: Pt will verbalize/demonstrate Good understanding of fall prevention/home safety modification techniques for improved safety during self-care  Short Term Goal 5: Pt will actively participate in 30+ mins of therapeutic exercise/functional activity for improved safety and independence with self-care  Additional Goals?: Yes  Short Term Goal 6: SBA amb to obtain set up for adls with good walker safety technique.  Short Term Goal 7: SBA with advanced adls with good walker safety technique.    Long Term Goals  Time Frame for Long Term Goals : upon dc  Long Term Goal 1: mod indep amb to obtain set up for adls with good safe techniques using least restrictive device.  Long Term Goal 2: Pt will complete LB ADLs with CATHERINE, Good safety, and use of AE/modified techniques as needed  Long Term Goal 3: Pt will complete functional mobility/transfers with CATHERINE, Good safety, and least restrictive device  Long Term Goal 4: Pt will tolerate dynamic/static standing/ ambulating with least restrictive device and mod indep for12+ mins during therapeutic exercise/functional activity for improved activity tolerance  Long Term Goal

## 2025-01-09 NOTE — PROGRESS NOTES
Speech Language Pathology  [facility] ACUTE REHAB    Cognitive/Speech-Language Treatment Note    Date: 2025  Patient’s Name: Suzan Garcia  MRN: [id number]  Diagnosis:   Patient Active Problem List   Diagnosis Code    ASCUS with positive high risk HPV cervical R87.610, R87.810    Screening mammogram for breast cancer Z12.31    Sleep disturbance G47.9    Cholelithiasis without cholangitis K80.20    Biliary colic K80.50    Cervical lymphadenopathy R59.0    Lymphadenopathy, generalized R59.1    Thrombocytopenia (HCC) D69.6    Leg weakness, bilateral R29.898    Bell's palsy G51.0    Guillain Barré syndrome (HCC) G61.0    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 Additional 8-minute delay: 3/3 with use of phrasing.     Problem Solving/Reasoning: Missing equipment: 5/5 independently.     Speech: ST instructed pt. To complete facial exercises targeting labial strength and ROM. Pt. Completed exercises x4 sets x10 reps each. Labial symmetry noted however pt. Continuing to reporting L labial weakness and anterior spill when drinking. Pt. Reports she has been completing exercises independently t/o the day.  ST reinforcing completion of exercises.     Other:  via language services used t/o session. No family present. Call light left within reach at end of session.    Plan:  [x] Continue ST services    [] Discharge from ST:      Discharge recommendations:  [x] Further therapy recommended at discharge.   [] No therapy recommended at discharge.      Treatment completed by: Sulma Kumar M.S. CF-SLP

## 2025-01-09 NOTE — CARE COORDINATION
ARU CASE MANAGEMENT NOTE:    Admission Date:  1/3/2025 Suzan Garcia is a 59 y.o.  female    Admitted for : Paraparesis (HCC) [G82.20]    Expected Discharge Date: 01/16/25      Spoke with patient regarding discharge plan. Plan remains to discharge home with spouse. HHC vs OP TBD    Outside appointments while in ARU: None    DME:TBD    Will continue to follow for additional discharge needs.    Electronically signed by Herminia Turner RN on 1/9/2025 at 1:15 PM

## 2025-01-09 NOTE — PROGRESS NOTES
[facility]   IN-PATIENT SERVICE   [facility]    Consult note            Date:   2025  Patient name:  Suzan Garcia  Date of admission:  1/3/2025  7:28 PM  MRN:   [id number]  Account:  [id number]  YOB: 1965  PCP:    No primary care provider on file.  Room:   [address] [place] [address]  Code Status:    Full Code    Chief Complaint:     No chief complaint on file.      History Obtained From:     patient    History of Present Illness:     The patient is a 59 y.o.   /  female who presents with No chief complaint on file.   and she is admitted to the hospital for the management of    Patient is 59-year-old female who initially came to the hospital with left-sided facial droop and bilateral lower extremity weakness, seen by neurology, head CT CTA was negative,MRI brain showed no acute intracranial abnormalities, except for some microvascular changes.MRI cervical spine showed mild spinal canal stenosis at C6-C7 and T1-T2. MRI thoracic spine showed mild spinal stenosis at T8-T9 secondary to posterior disc protrusion. MRI lumbar spine showed central disc protrusion at L4-L5, moderately narrows the spinal canal. Posterior disc bulge at L3-L4 and mild spinal canal stenosis. Mild bilateral neural foraminal narrowing at L3-L4 and L4-L5.  LP consistent with albumin cytologic dissociation, seen by neurology, started on IVIG, completed 5  Currently admitted at [facility] for further   was used during encounter    Past Medical History:     Past Medical History:   Diagnosis Date    Chronic back pain         Past Surgical History:     Past Surgical History:   Procedure Laterality Date     SECTION      IR BIOPSY LYMPH NODE SUPERFICIAL  2024    IR BIOPSY LYMPH NODE SUPERFICIAL 2024 [facility] SPECIAL PROCEDURES        Medications Prior to Admission:     Prior to Admission medications    Medication Sig Start Date End Date Taking? Authorizing Provider

## 2025-01-10 LAB — GLUCOSE BLD-MCNC: 101 MG/DL (ref 65–105)

## 2025-01-10 PROCEDURE — 97530 THERAPEUTIC ACTIVITIES: CPT

## 2025-01-10 PROCEDURE — 97116 GAIT TRAINING THERAPY: CPT

## 2025-01-10 PROCEDURE — 97535 SELF CARE MNGMENT TRAINING: CPT

## 2025-01-10 PROCEDURE — 6360000002 HC RX W HCPCS

## 2025-01-10 PROCEDURE — 6370000000 HC RX 637 (ALT 250 FOR IP)

## 2025-01-10 PROCEDURE — 97110 THERAPEUTIC EXERCISES: CPT

## 2025-01-10 PROCEDURE — 82947 ASSAY GLUCOSE BLOOD QUANT: CPT

## 2025-01-10 PROCEDURE — 97129 THER IVNTJ 1ST 15 MIN: CPT

## 2025-01-10 PROCEDURE — 99232 SBSQ HOSP IP/OBS MODERATE 35: CPT | Performed by: INTERNAL MEDICINE

## 2025-01-10 PROCEDURE — 99232 SBSQ HOSP IP/OBS MODERATE 35: CPT | Performed by: GENERAL PRACTICE

## 2025-01-10 PROCEDURE — 6370000000 HC RX 637 (ALT 250 FOR IP): Performed by: GENERAL PRACTICE

## 2025-01-10 PROCEDURE — 1180000000 HC REHAB R&B

## 2025-01-10 PROCEDURE — 92507 TX SP LANG VOICE COMM INDIV: CPT

## 2025-01-10 RX ADMIN — GABAPENTIN 400 MG: 400 CAPSULE ORAL at 07:24

## 2025-01-10 RX ADMIN — CARBOXYMETHYLCELLULOSE SODIUM 1 DROP: 5 SOLUTION/ DROPS OPHTHALMIC at 07:25

## 2025-01-10 RX ADMIN — Medication 6 MG: at 19:51

## 2025-01-10 RX ADMIN — ACETAMINOPHEN 650 MG: 325 TABLET ORAL at 19:51

## 2025-01-10 RX ADMIN — ENOXAPARIN SODIUM 40 MG: 100 INJECTION SUBCUTANEOUS at 07:24

## 2025-01-10 RX ADMIN — GABAPENTIN 400 MG: 400 CAPSULE ORAL at 13:17

## 2025-01-10 RX ADMIN — BUTALBITA,ACETAMINOPHEN AND CAFFEINE 1 CAPSULE: 50; 300; 40 CAPSULE ORAL at 02:55

## 2025-01-10 RX ADMIN — TIZANIDINE 4 MG: 4 TABLET ORAL at 07:24

## 2025-01-10 RX ADMIN — AMLODIPINE BESYLATE 2.5 MG: 2.5 TABLET ORAL at 07:24

## 2025-01-10 RX ADMIN — GABAPENTIN 600 MG: 300 CAPSULE ORAL at 19:51

## 2025-01-10 RX ADMIN — ROSUVASTATIN 40 MG: 40 TABLET, FILM COATED ORAL at 19:50

## 2025-01-10 RX ADMIN — TIZANIDINE 4 MG: 4 TABLET ORAL at 13:16

## 2025-01-10 RX ADMIN — ACETAMINOPHEN 650 MG: 325 TABLET ORAL at 13:15

## 2025-01-10 RX ADMIN — TIZANIDINE 4 MG: 4 TABLET ORAL at 19:51

## 2025-01-10 RX ADMIN — ACETAMINOPHEN 650 MG: 325 TABLET ORAL at 05:10

## 2025-01-10 ASSESSMENT — PAIN DESCRIPTION - LOCATION
LOCATION: HEAD
LOCATION: LEG

## 2025-01-10 ASSESSMENT — PAIN SCALES - GENERAL
PAINLEVEL_OUTOF10: 5
PAINLEVEL_OUTOF10: 5
PAINLEVEL_OUTOF10: 8
PAINLEVEL_OUTOF10: 7
PAINLEVEL_OUTOF10: 0

## 2025-01-10 ASSESSMENT — PAIN DESCRIPTION - DESCRIPTORS
DESCRIPTORS: ACHING
DESCRIPTORS: ACHING

## 2025-01-10 ASSESSMENT — PAIN - FUNCTIONAL ASSESSMENT: PAIN_FUNCTIONAL_ASSESSMENT: ACTIVITIES ARE NOT PREVENTED

## 2025-01-10 ASSESSMENT — PAIN DESCRIPTION - ORIENTATION: ORIENTATION: RIGHT;LEFT

## 2025-01-10 NOTE — PLAN OF CARE
Problem: Discharge Planning  Goal: Discharge to home or other facility with appropriate resources  1/10/2025 1504 by Amanda Lainez RN  Outcome: Progressing     Problem: Safety - Adult  Goal: Free from fall injury  1/10/2025 1504 by Amanda Lainez RN  Outcome: Progressing     Problem: Skin/Tissue Integrity  Goal: Absence of new skin breakdown  Description: 1.  Monitor for areas of redness and/or skin breakdown  2.  Assess vascular access sites hourly  3.  Every 4-6 hours minimum:  Change oxygen saturation probe site  4.  Every 4-6 hours:  If on nasal continuous positive airway pressure, respiratory therapy assess nares and determine need for appliance change or resting period.  1/10/2025 1504 by Amanda Lainez RN  Outcome: Progressing     Problem: Pain  Goal: Verbalizes/displays adequate comfort level or baseline comfort level  1/10/2025 1504 by Amanda Lainez RN  Outcome: Progressing     Problem: ABCDS Injury Assessment  Goal: Absence of physical injury  1/10/2025 1504 by Amanda Lainez RN  Outcome: Progressing

## 2025-01-10 NOTE — PROGRESS NOTES
Physical Therapy  Facility/Department: [facility] ACUTE REHAB  Treatment note    NAME: Suzan Garcia  : 1965 (59 y.o.)  MRN: [id number]  CODE STATUS: Full Code  Date of Service: 1/10/25    Past Medical History:   Diagnosis Date    Chronic back pain      Past Surgical History:   Procedure Laterality Date     SECTION      IR BIOPSY LYMPH NODE SUPERFICIAL  2024    IR BIOPSY LYMPH NODE SUPERFICIAL 2024 [facility] SPECIAL PROCEDURES     Family/Caregiver Present: No  General  General Comments:  Phil' session continued from OT (Session ID: [id number]   ID: #[id number])    Restrictions:  Restrictions/Precautions: Fall Risk;General Precautions     SUBJECTIVE  Subjective: Pt states she's having gradual improvement of symptoms, progress; notes she can still feel weakness in R LE in particular.    OBJECTIVE  Functional Mobility  Bed mobility  Bridging: Supervision  Rolling to Left: Supervision  Rolling to Right: Supervision  Supine to Sit: Supervision  Sit to Supine: Supervision  Scooting: Supervision  Bed Mobility Comments: Mat, wedge, 2 pillows.  Transfers  Sit to Stand: Stand by assistance  Stand to Sit: Stand by assistance  Bed to Chair: Stand by assistance  Stand Pivot Transfers: Stand by assistance  Lateral Transfers: Stand by assistance  Comment: RW; various surfaces/heights.  Balance  Posture: Fair  Sitting - Static: Good (EOB)  Sitting - Dynamic: Good (Edge of sofa)  Standing - Static: Fair (RW)  Standing - Dynamic: Fair (RW)    Environmental Mobility  Ambulation  Surface: Level tile  Device: Rolling Walker  Assistance:  (CGA for longer distance, close SBA for short distances.)  Quality of Gait: Narrow HORTENSIA, G reciprocal gait pattern, downward gaze, kyphotic posture.  Gait Deviations: Decreased step height;Decreased step length  Distance: 229'; short distances within day room.  Comments: G response to cues for posture and wider HORTENSIA; no response to cues to adapt gaze upward.  More  distance of 30 ft x2, in incline surfaces, with RW/Rollaotr, CGA  Long Term Goal 5: Pt to ambulate distance of 150 ft (2MWT) with RW/Rollaotr on level surfaces, SBA to improve independence and endurance.  Long term goal 6: Pt able to perform flight of steps with 1 UE support, SBA.  Long term goal 7: Pt to improve Tinetti balance score from 4/28 to 21/28  atleast to reduce fall risk.  Long term goal 8: Pt to propel w/c, distance of 150 ft, level surfaces , mod-I to improve strength an endurance.    PLAN OF CARE  Frequency: 1-2 treatment sessions per day, 5-7 days per week  Physical Therapy Plan  General Plan:  minutes of therapy at least 5 out of 7 days a week  Current Treatment Recommendations: Gait training;Stair training;Functional mobility training;Balance training;Transfer training;Therapeutic activities;Safety education & training;Endurance training  Safety Devices  Type of Devices: All fall risk precautions in place;Left in chair;Patient at risk for falls;Gait belt;Chair alarm in place;Heels elevated for pressure relief (Care transferred to SLP Sulma.)    Therapy Time   Individual Concurrent Group Co-treatment   Time In 0910         Time Out 1033         Minutes 83           Desire Sarkar PTA, 01/10/25 at 4:31 PM

## 2025-01-10 NOTE — PROGRESS NOTES
Speech Language Pathology  [facility] ACUTE REH[facility]    Cognitive/Speech-Language Treatment Note    Date: 1/10/2025  Patient’s Name: Suzan Garcia  MRN: [id number]  Diagnosis:   Patient Active Problem List   Diagnosis Code    ASCUS with positive high risk HPV cervical R87.610, R87.810    Screening mammogram for breast cancer Z12.31    Sleep disturbance G47.9    Cholelithiasis without cholangitis K80.20    Biliary colic K80.50    Cervical lymphadenopathy R59.0    Lymphadenopathy, generalized R59.1    Thrombocytopenia (HCC) D69.6    Leg weakness, bilateral R29.898    Bell's palsy G51.0    Guillain Barré syndrome (HCC) G61.0    Paraparesis ([facility]) G82.20       Pain Rating: None reported    Cognitive/Speech-Language Treatment    Treatment time:       SLP Individual Minutes  Time In: 1032  Time Out: 1104  Minutes: 32     Subjective: [x] Alert [x] Cooperative     [] Confused     [] Agitated    [] Lethargic    Objective/Assessment:    Recall: Short-term recall of 3 varied units: 8-minute delay: 3/3. Post additional 10-minute delay: 3/3.     Problem Solving/Reasoning: Stating logical conclusions: 4/4.    Speech: ST instructed pt. To complete facial exercises targeting labial strength and ROM. Pt. Completed exercises x6 sets x10 reps each. Labial symmetry noted (pt. Also reporting symmetry). Pt. Reports she has been completing exercises independently t/o the day.  ST reinforcing completion of exercises.     Pt. Reporting \"speech difference since first being at the hospital\" characterized by increased rate of speech. Verbal education provided re: dysarthria compensatory strategies (reduced rate of speech, over-enunciation, and increased volume) to increase speech clarity with pt. Receptive to education and verbalizing understanding.    Other:  via language services used t/o session. Call light left within reach at end of session.     Plan:  [x] Continue ST services    [] Discharge from ST:      Discharge recommendations:   [x] Further therapy recommended at discharge.   [] No therapy recommended at discharge.      Treatment completed by: Sulma Kumar M.S. CF-SLP

## 2025-01-10 NOTE — PROGRESS NOTES
Physical Medicine & Rehabilitation  Progress Note        Subjective:      59-year-old female with bilateral lower extremity weakness secondary to Guillain-Barré syndrome. No acute events overnight. Patient continues to experience persistent paresthesias to both legs, slowly improving on gabapentin. Had some spasm pain today, improved with antispasmodic. Sleeping well. No new concerns.       ROS:  Denies fevers, chills, sweats.  No chest pain, palpitations, lightheadedness.  Denies coughing, wheezing or shortness of breath.  Denies abdominal pain, nausea, diarrhea or constipation.  No new areas of joint pain.  Denies new areas of numbness or weakness.  Denies new anxiety or depression issues.  No new skin problems.    Rehabilitation:       PT:    Bed mobility  Rolling to Left: Stand by assistance  Rolling to Right: Stand by assistance  Supine to Sit: Supervision  Sit to Supine: Stand by assistance  Scooting: Stand by assistance  Bed Mobility Comments: HOB flat no bed rail.         Transfers  Sit to Stand: Contact guard assistance  Stand to Sit: Contact guard assistance  Bed to Chair: Contact guard assistance  Stand Pivot Transfers: Contact guard assistance (recliner<>w/c<>bed)  Comment: RW         Ambulation  Surface: Level tile  Device: Rolling Walker  Other Apparatus: Wheelchair follow  Assistance: Contact guard assistance  Quality of Gait: Narrow HORTENSIA, G reciprocal gait pattern, downward gaze, kyphotic posture.  Gait Deviations: Decreased step height, Decreased step length  Distance: 110' x2  Comments: G response to postural cues; no response to cues to adapt gaze upward.       OT:  Grooming/Oral Hygiene  Assistance Level: Stand by assist  Skilled Clinical Factors: standing at sink for oral care  Upper Extremity Bathing  Assistance Level: Stand by assist  Skilled Clinical Factors: standing at sink  Lower Extremity Bathing  Assistance Level: Contact guard assist  Skilled Clinical Factors: standing/seated at  12/26/2024    HDL 20 (L) 12/26/2024    TRIG 188 (H) 12/26/2024     LIVER PROFILE:   No results for input(s): \"AST\", \"ALT\", \"BILIDIR\", \"BILITOT\", \"ALKPHOS\" in the last 72 hours.    Invalid input(s): \"ALB\"       Current Medications:   Current Facility-Administered Medications: gabapentin (NEURONTIN) capsule 600 mg, 600 mg, Oral, Nightly  gabapentin (NEURONTIN) capsule 400 mg, 400 mg, Oral, BID  acetaminophen (TYLENOL) tablet 650 mg, 650 mg, Oral, q8h  melatonin tablet 6 mg, 6 mg, Oral, Nightly  acetaminophen (TYLENOL) tablet 650 mg, 650 mg, Oral, Q4H PRN  polyethylene glycol (GLYCOLAX) packet 17 g, 17 g, Oral, Daily  senna (SENOKOT) tablet 17.2 mg, 2 tablet, Oral, Daily PRN  bisacodyl (DULCOLAX) suppository 10 mg, 10 mg, Rectal, Daily PRN  amLODIPine (NORVASC) tablet 2.5 mg, 2.5 mg, Oral, Daily  butalbital-APAP-caffeine -40 MG per capsule 1 capsule, 1 capsule, Oral, Q4H PRN  carboxymethylcellulose (REFRESH PLUS) 0.5 % ophthalmic solution 1 drop, 1 drop, Left Eye, 4x Daily  enoxaparin (LOVENOX) injection 40 mg, 40 mg, SubCUTAneous, Daily  lidocaine 4 % external patch 1 patch, 1 patch, TransDERmal, Daily  lubrifresh P.M. (artificial tears) ophthalmic ointment, , Both Eyes, Q1H PRN  ondansetron (ZOFRAN-ODT) disintegrating tablet 4 mg, 4 mg, Oral, Q8H PRN **OR** ondansetron (ZOFRAN) injection 4 mg, 4 mg, IntraVENous, Q6H PRN  rosuvastatin (CRESTOR) tablet 40 mg, 40 mg, Oral, Nightly  tiZANidine (ZANAFLEX) tablet 4 mg, 4 mg, Oral, Q6H PRN      Impression/Plan:   Impaired ADLs, gait, and mobility due to:      Bilateral lower extremity weakness, left facial droop secondary to Guillain-Barré syndrome:  PT/OT for gait, mobility, strengthening, endurance, ADLs, and self care. S/p IVIG x5 days.   Cognitive impairment: SLP for cognition and communication  Dysarthria: SLP for speech  Mild oral stage dysphagia: SLP for swallowing.  Paresthesias: primarily BLE and abdomen; Increased gabapentin to 3 times daily, start routine

## 2025-01-10 NOTE — PROGRESS NOTES
[facility]   IN-PATIENT SERVICE   [facility]    Consult note            Date:   1/10/2025  Patient name:  Suzan Garcia  Date of admission:  1/3/2025  7:28 PM  MRN:   [id number]  Account:  [id number]  YOB: 1965  PCP:    No primary care provider on file.  Room:   [address] [place] [address]  Code Status:    Full Code    Chief Complaint:     No chief complaint on file.      History Obtained From:     patient    History of Present Illness:     The patient is a 59 y.o.   /  female who presents with No chief complaint on file.   and she is admitted to the hospital for the management of    Patient is 59-year-old female who initially came to the hospital with left-sided facial droop and bilateral lower extremity weakness, seen by neurology, head CT CTA was negative,MRI brain showed no acute intracranial abnormalities, except for some microvascular changes.MRI cervical spine showed mild spinal canal stenosis at C6-C7 and T1-T2. MRI thoracic spine showed mild spinal stenosis at T8-T9 secondary to posterior disc protrusion. MRI lumbar spine showed central disc protrusion at L4-L5, moderately narrows the spinal canal. Posterior disc bulge at L3-L4 and mild spinal canal stenosis. Mild bilateral neural foraminal narrowing at L3-L4 and L4-L5.  LP consistent with albumin cytologic dissociation, seen by neurology, started on IVIG, completed 5  Currently admitted at [facility] for further   was used during encounter    Past Medical History:     Past Medical History:   Diagnosis Date    Chronic back pain         Past Surgical History:     Past Surgical History:   Procedure Laterality Date     SECTION      IR BIOPSY LYMPH NODE SUPERFICIAL  2024    IR BIOPSY LYMPH NODE SUPERFICIAL 2024 [facility] SPECIAL PROCEDURES        Medications Prior to Admission:     Prior to Admission medications    Medication Sig Start Date End Date Taking? Authorizing Provider    HEMATOLOGIC/LYMPHATIC:  negative for swelling/edema   ALLERGIC/IMMUNOLOGIC:  negative for urticaria , itching  ENDOCRINE:  negative increase in drinking, increase in urination, hot or cold intolerance  MUSCULOSKELETAL:  negative joint pains, muscle aches, swelling of joints  NEUROLOGICAL: Refer weakness  BEHAVIOR/PSYCH:  negative for depression, anxiety    Physical Exam:   BP (!) 161/96   Pulse 66   Temp 98.1 °F (36.7 °C) (Oral)   Resp 16   Ht 1.54 m (5' 0.63\")   Wt 59.7 kg (131 lb 9.8 oz)   LMP 10/13/2015 (Approximate) Comment: possible menopause  SpO2 98%   BMI 25.17 kg/m²   Temp (24hrs), Av.2 °F (36.8 °C), Min:98.1 °F (36.7 °C), Max:98.2 °F (36.8 °C)    Recent Labs     25  1658 01/10/25  1110   POCGLU 125* 101     No intake or output data in the 24 hours ending 01/10/25 1846      General Appearance:  alert, well appearing, and in no acute distress  Mental status: oriented to person, place, and time with normal affect  Head:  normocephalic, atraumatic.  Eye: no icterus, redness, pupils equal and reactive, extraocular eye movements intact, conjunctiva clear  Ear: normal external ear, no discharge, hearing intact  Nose:  no drainage noted  Mouth: mucous membranes moist  Neck: supple, no carotid bruits, thyroid not palpable  Lungs: Bilateral equal air entry, clear to ausculation, no wheezing, rales or rhonchi, normal effort  Cardiovascular: normal rate, regular rhythm, no murmur, gallop, rub.  Abdomen: Soft, nontender, nondistended, normal bowel sounds, no hepatomegaly or splenomegaly  Neurologic: Strength right lower extremity 2/5, right upper extremity 5/5, left upper extremity 4/5 skin: No gross lesions, rashes, bruising or bleeding on exposed skin area  Extremities:  peripheral pulses palpable, no pedal edema or calf pain with palpation      Investigations:      Laboratory Testing:  Recent Results (from the past 24 hour(s))   POC Glucose Fingerstick    Collection Time: 01/10/25 11:10 AM

## 2025-01-10 NOTE — PROGRESS NOTES
[facility]   Acute Rehabilitation Occupational Therapy Daily Treatment Note    Date: 1/10/25  Patient Name: Suzan Garcia       Room: 2641/2641-01  MRN: [id number]  Account: [id number]   : 1965  (59 y.o.) Gender: female          Diagnosis: Guillain Bacliff Syndrome with paraparesis , cranial nerve VII palsy, Dysphagia  Additional Pertinent Hx: Suzan Garcia is a 59 y.o. right-handed female with history of HTN admitted to [facility] on 2024.       She initially presented with left facial droop and bilateral lower limb weakness for 1 day.  She had been recently admitted to [place] with cholelithiasis and thoracic and abdominal lymphadenopathy.  Upon the current presentation, MRI brain showed no acute intracranial abnormality.  MRI of the spine showed no acute abnormalities.  Lumbar puncture was done on 24.  She is currently receiving a 5-day course of IVIG for Guillain-Bacliff syndrome (last dose 25).     She reports ongoing bilateral lower limb weakness, right greater than left.  She also notes numbness/tingling in the bilateral lower limbs.  She states that she continues to have some weakness in the left side of her face along with speech difficulty.  She reports blurred vision as well.  per neuro MD 1-3: Bilateral, R>L, leg weakness and left CN VII palsy. In a patient with CSF albuminocytologic dissociation, consistent with Guillain-Barré syndrome.  Somewhat atypical given asymmetric findings, although similar cases have previously been reported in literature. GM AB panel negative. Symptoms improved with IVIG.    Treatment Diagnosis: Impaired self-care status due to GB with BLE weakness affecting mobility.    Past Medical History:  has a past medical history of Chronic back pain.    Past Surgical History:   has a past surgical history that includes  section and IR BIOPSY LYMPH NODE SUPERFICIAL  completes functional mobility around simulated kitchen removing/ placing items in fridge and cabinets with good safety.       Mobility  Bed Mobility  Overall Assistance Level: Modified Independent      Sit to Supine  Assistance Level: Modified independent  Supine to Sit  Assistance Level: Modified independent          Sit to Stand  Assistance Level: Contact guard assist  Skilled Clinical Factors: CGA/SBA. Verbal cuing for hand placement.  Stand to Sit  Assistance Level: Contact guard assist  Skilled Clinical Factors: CGA/SBA Verbal cuing for hand placement.     Functional Mobility  Activity: Retrieve items;Transport items;To/From bathroom (around therapy gym)  Assistance Level: Contact guard assist  Skilled Clinical Factors: CGA/SBA with RW for stability       OT Exercises  Exercise Treatment: Pt engaged in BUE exercises with 2# free weight in all planes 10-20 reps depending on plane, to increase strength and endurance for ADL and IADL activities.      Assessment  Assessment  Activity Tolerance: Patient limited by endurance;Patient limited by fatigue (Extra time required for translation)    Patient Education  Education  Education Given To: Patient  Education Provided: Fall Prevention Strategies;Energy Conservation;Safety;ADL Function;Mobility Training;Transfer Training  Education Method: Verbal  Barriers to Learning: None  Education Outcome: Verbalized understanding         Safety Devices  Safety Devices in place: Yes  Type of devices: Left in bed;Bed alarm in place;Call light within reach       Goals  Patient Goals   Patient goals : \"go home,  do things on my own, not depend on anybody.\"  Short Term Goals  Time Frame for Short Term Goals: 1 week  Short Term Goal 1: Pt will complete LB ADLs with supervision, Good safety, and use of AE/modified techniques as needed  Short Term Goal 2: Pt will complete functional transfers with SBA, Good safety, and least restrictive device  Short Term Goal 3: Pt will tolerate

## 2025-01-11 LAB
ANION GAP SERPL CALCULATED.3IONS-SCNC: 9 MMOL/L (ref 9–16)
BASOPHILS # BLD: 0.2 K/UL (ref 0–0.2)
BASOPHILS NFR BLD: 2 % (ref 0–2)
BUN SERPL-MCNC: 12 MG/DL (ref 6–20)
CALCIUM SERPL-MCNC: 9 MG/DL (ref 8.6–10.4)
CHLORIDE SERPL-SCNC: 103 MMOL/L (ref 98–107)
CO2 SERPL-SCNC: 26 MMOL/L (ref 20–31)
CREAT SERPL-MCNC: 0.5 MG/DL (ref 0.7–1.2)
EOSINOPHIL # BLD: 0.9 K/UL (ref 0–0.4)
EOSINOPHILS RELATIVE PERCENT: 12 % (ref 0–4)
ERYTHROCYTE [DISTWIDTH] IN BLOOD BY AUTOMATED COUNT: 14.5 % (ref 11.5–14.9)
GFR, ESTIMATED: >90 ML/MIN/1.73M2
GLUCOSE SERPL-MCNC: 104 MG/DL (ref 74–99)
HCT VFR BLD AUTO: 34 % (ref 36–46)
HGB BLD-MCNC: 11.9 G/DL (ref 12–16)
LYMPHOCYTES NFR BLD: 0.7 K/UL (ref 1–4.8)
LYMPHOCYTES RELATIVE PERCENT: 9 % (ref 24–44)
MCH RBC QN AUTO: 28.4 PG (ref 26–34)
MCHC RBC AUTO-ENTMCNC: 34.9 G/DL (ref 31–37)
MCV RBC AUTO: 81.3 FL (ref 80–100)
MONOCYTES NFR BLD: 0.4 K/UL (ref 0.1–1.3)
MONOCYTES NFR BLD: 5 % (ref 1–7)
NEUTROPHILS NFR BLD: 72 % (ref 36–66)
NEUTS SEG NFR BLD: 5.6 K/UL (ref 1.3–9.1)
PLATELET # BLD AUTO: 261 K/UL (ref 150–450)
PMV BLD AUTO: 7.6 FL (ref 6–12)
POTASSIUM SERPL-SCNC: 4.1 MMOL/L (ref 3.7–5.3)
RBC # BLD AUTO: 4.18 M/UL (ref 4–5.2)
SODIUM SERPL-SCNC: 138 MMOL/L (ref 136–145)
WBC OTHER # BLD: 7.8 K/UL (ref 3.5–11)

## 2025-01-11 PROCEDURE — 97530 THERAPEUTIC ACTIVITIES: CPT

## 2025-01-11 PROCEDURE — 97116 GAIT TRAINING THERAPY: CPT

## 2025-01-11 PROCEDURE — 6360000002 HC RX W HCPCS

## 2025-01-11 PROCEDURE — 1180000000 HC REHAB R&B

## 2025-01-11 PROCEDURE — 99232 SBSQ HOSP IP/OBS MODERATE 35: CPT | Performed by: INTERNAL MEDICINE

## 2025-01-11 PROCEDURE — 6370000000 HC RX 637 (ALT 250 FOR IP)

## 2025-01-11 PROCEDURE — 36415 COLL VENOUS BLD VENIPUNCTURE: CPT

## 2025-01-11 PROCEDURE — 97110 THERAPEUTIC EXERCISES: CPT

## 2025-01-11 PROCEDURE — 99232 SBSQ HOSP IP/OBS MODERATE 35: CPT | Performed by: PHYSICAL MEDICINE & REHABILITATION

## 2025-01-11 PROCEDURE — 85025 COMPLETE CBC W/AUTO DIFF WBC: CPT

## 2025-01-11 PROCEDURE — 97535 SELF CARE MNGMENT TRAINING: CPT

## 2025-01-11 PROCEDURE — 80048 BASIC METABOLIC PNL TOTAL CA: CPT

## 2025-01-11 PROCEDURE — 6370000000 HC RX 637 (ALT 250 FOR IP): Performed by: STUDENT IN AN ORGANIZED HEALTH CARE EDUCATION/TRAINING PROGRAM

## 2025-01-11 PROCEDURE — 6370000000 HC RX 637 (ALT 250 FOR IP): Performed by: GENERAL PRACTICE

## 2025-01-11 RX ORDER — LIDOCAINE 4 G/G
1 PATCH TOPICAL DAILY
Status: DISCONTINUED | OUTPATIENT
Start: 2025-01-11 | End: 2025-01-16 | Stop reason: HOSPADM

## 2025-01-11 RX ADMIN — CARBOXYMETHYLCELLULOSE SODIUM 1 DROP: 5 SOLUTION/ DROPS OPHTHALMIC at 07:47

## 2025-01-11 RX ADMIN — ACETAMINOPHEN 650 MG: 325 TABLET ORAL at 05:49

## 2025-01-11 RX ADMIN — ACETAMINOPHEN 650 MG: 325 TABLET ORAL at 03:19

## 2025-01-11 RX ADMIN — CARBOXYMETHYLCELLULOSE SODIUM 1 DROP: 5 SOLUTION/ DROPS OPHTHALMIC at 20:32

## 2025-01-11 RX ADMIN — Medication 6 MG: at 20:31

## 2025-01-11 RX ADMIN — TIZANIDINE 4 MG: 4 TABLET ORAL at 07:47

## 2025-01-11 RX ADMIN — ENOXAPARIN SODIUM 40 MG: 100 INJECTION SUBCUTANEOUS at 07:47

## 2025-01-11 RX ADMIN — ROSUVASTATIN 40 MG: 40 TABLET, FILM COATED ORAL at 20:31

## 2025-01-11 RX ADMIN — GABAPENTIN 400 MG: 400 CAPSULE ORAL at 13:16

## 2025-01-11 RX ADMIN — BUTALBITA,ACETAMINOPHEN AND CAFFEINE 1 CAPSULE: 50; 300; 40 CAPSULE ORAL at 10:04

## 2025-01-11 RX ADMIN — ACETAMINOPHEN 650 MG: 325 TABLET ORAL at 20:31

## 2025-01-11 RX ADMIN — AMLODIPINE BESYLATE 2.5 MG: 2.5 TABLET ORAL at 07:47

## 2025-01-11 RX ADMIN — GABAPENTIN 600 MG: 300 CAPSULE ORAL at 20:31

## 2025-01-11 RX ADMIN — GABAPENTIN 400 MG: 400 CAPSULE ORAL at 07:47

## 2025-01-11 RX ADMIN — CARBOXYMETHYLCELLULOSE SODIUM 1 DROP: 5 SOLUTION/ DROPS OPHTHALMIC at 13:17

## 2025-01-11 RX ADMIN — ACETAMINOPHEN 650 MG: 325 TABLET ORAL at 13:16

## 2025-01-11 ASSESSMENT — PAIN - FUNCTIONAL ASSESSMENT
PAIN_FUNCTIONAL_ASSESSMENT: ACTIVITIES ARE NOT PREVENTED
PAIN_FUNCTIONAL_ASSESSMENT: PREVENTS OR INTERFERES SOME ACTIVE ACTIVITIES AND ADLS
PAIN_FUNCTIONAL_ASSESSMENT: ACTIVITIES ARE NOT PREVENTED

## 2025-01-11 ASSESSMENT — PAIN DESCRIPTION - ORIENTATION
ORIENTATION: MID
ORIENTATION: RIGHT;LEFT
ORIENTATION: LEFT;RIGHT

## 2025-01-11 ASSESSMENT — PAIN DESCRIPTION - LOCATION
LOCATION: HEAD

## 2025-01-11 ASSESSMENT — PAIN DESCRIPTION - DESCRIPTORS
DESCRIPTORS: POUNDING
DESCRIPTORS: ACHING
DESCRIPTORS: OTHER (COMMENT)

## 2025-01-11 ASSESSMENT — PAIN SCALES - GENERAL
PAINLEVEL_OUTOF10: 6
PAINLEVEL_OUTOF10: 5
PAINLEVEL_OUTOF10: 1
PAINLEVEL_OUTOF10: 8
PAINLEVEL_OUTOF10: 0
PAINLEVEL_OUTOF10: 5
PAINLEVEL_OUTOF10: 8
PAINLEVEL_OUTOF10: 6

## 2025-01-11 NOTE — PROGRESS NOTES
[facility]   Acute Rehabilitation Occupational Therapy Daily Treatment Note    Date: 25  Patient Name: Suzan Garcia       Room: 2641/2641-01  MRN: [id number]  Account: [id number]   : 1965  (59 y.o.) Gender: female          Diagnosis: Guillain Cheltenham Syndrome with paraparesis , cranial nerve VII palsy, Dysphagia  Additional Pertinent Hx: Suzan Garcia is a 59 y.o. right-handed female with history of HTN admitted to [facility] on 2024.       She initially presented with left facial droop and bilateral lower limb weakness for 1 day.  She had been recently admitted to [place] with cholelithiasis and thoracic and abdominal lymphadenopathy.  Upon the current presentation, MRI brain showed no acute intracranial abnormality.  MRI of the spine showed no acute abnormalities.  Lumbar puncture was done on 24.  She is currently receiving a 5-day course of IVIG for Guillain-Cheltenham syndrome (last dose 25).     She reports ongoing bilateral lower limb weakness, right greater than left.  She also notes numbness/tingling in the bilateral lower limbs.  She states that she continues to have some weakness in the left side of her face along with speech difficulty.  She reports blurred vision as well.  per neuro MD 1-3: Bilateral, R>L, leg weakness and left CN VII palsy. In a patient with CSF albuminocytologic dissociation, consistent with Guillain-Barré syndrome.  Somewhat atypical given asymmetric findings, although similar cases have previously been reported in literature. GM AB panel negative. Symptoms improved with IVIG.    Treatment Diagnosis: Impaired self-care status due to GB with BLE weakness affecting mobility.    Past Medical History:  has a past medical history of Chronic back pain.    Past Surgical History:   has a past surgical history that includes  section and IR BIOPSY LYMPH NODE SUPERFICIAL  implements good hand placement.        Sit Pivot  Assistance Level: Contact guard assist  Skilled Clinical Factors: from bedside recliner > bed without device        Functional Mobility  Device: Rolling walker  Activity: To/From bathroom (in hallway to simulate household distances.)  Assistance Level: Contact guard assist  Skilled Clinical Factors: AM: CGA/SBA with RW for stability to<>bathroom. In PM: performed functional mobility household distances in hallway at SBA.       OT Exercises  Exercise Treatment: PM: Pt engaged in BUE exercises with 2# free weight in all planes 15 reps each of shoulder flexion, horizontal abduction, chest press, elbow flexion, and wrist extension, to increase strength and endurance for ADL and IADL activities.  Dynamic Standing Balance Exercises: PM: Attempted BUE strengthening in standing, pt tolerated during shoulder flexion and wrist extension exercises then returned self to sitting.       Assessment  Assessment  Activity Tolerance: Patient limited by endurance  Discharge Recommendations: Continue to assess pending progress;24 hour supervision or assist    Patient Education  Education  Education Given To: Patient  Education Provided: Fall Prevention Strategies;Energy Conservation;Safety;ADL Function;Mobility Training;Transfer Training;Equipment;Home Exercise Program  Education Provided Comments: tub transfers bench, safety awareness, AE options including basket for RW and clip for lotion bottle, upper body strengthening to improve functional use of extremities  Education Method: Verbal;Demonstration  Barriers to Learning: None  Education Outcome: Verbalized understanding;Demonstrated understanding;Continued education needed    OT Equipment Recommendations  Other: grab bars for shower and toilet    Safety Devices  Safety Devices in place: Yes  Type of devices: Call light within reach;Chair alarm in place;Gait belt;Left in chair       Family education  To be arranged    Goals  Patient

## 2025-01-11 NOTE — PLAN OF CARE
Problem: Discharge Planning  Goal: Discharge to home or other facility with appropriate resources  1/11/2025 0141 by Shona Fernandez LPN  Outcome: Progressing     Problem: Safety - Adult  Goal: Free from fall injury  1/11/2025 0141 by Shona Fernandez LPN  Outcome: Progressing     Problem: Skin/Tissue Integrity  Goal: Absence of new skin breakdown  Description: 1.  Monitor for areas of redness and/or skin breakdown  2.  Assess vascular access sites hourly  3.  Every 4-6 hours minimum:  Change oxygen saturation probe site  4.  Every 4-6 hours:  If on nasal continuous positive airway pressure, respiratory therapy assess nares and determine need for appliance change or resting period.  1/11/2025 0141 by Shona Fernandez LPN  Outcome: Progressing     Problem: Pain  Goal: Verbalizes/displays adequate comfort level or baseline comfort level  1/11/2025 0141 by Shona Fernandez LPN  Outcome: Progressing     Problem: ABCDS Injury Assessment  Goal: Absence of physical injury  1/11/2025 0141 by Shona Fernandez LPN  Outcome: Progressing

## 2025-01-11 NOTE — PROGRESS NOTES
Physical Medicine & Rehabilitation  Progress Note      Subjective:      59 year-old female with AIDP with BLE weakness and paresthesia/neuropathic pain. Patient is having problems with headaches and numbness/tingling with nerve pain and weakness worse in her R leg than her Left.  She did have headache this morning which was accompanied by nausea. This responded to prn Fioricet. When she has these headaches at home they are also accompanied by photophobia and phonophobia.    Evaluated with virtual  today    ROS:  Denies fevers, chills, sweats.  No chest pain, palpitations, lightheadedness.  Denies coughing, wheezing or shortness of breath.  Denies abdominal pain, nausea, diarrhea or constipation.  No new areas of joint pain.  Denies new areas of numbness or weakness.  Denies new anxiety or depression issues.  No new skin problems.    Rehabilitation:       PT:    Bed mobility  Bridging: Supervision  Rolling to Left: Supervision  Rolling to Right: Supervision  Supine to Sit: Supervision  Sit to Supine: Supervision  Scooting: Supervision  Bed Mobility Comments: Mat, wedge, 2 pillows.         Transfers  Sit to Stand: Stand by assistance  Stand to Sit: Stand by assistance  Bed to Chair: Stand by assistance  Stand Pivot Transfers: Stand by assistance (c walker)  Lateral Transfers: Stand by assistance  Comment: RW; various surfaces/heights.         Ambulation  Surface: Level tile  Device: Rolling Walker  Other Apparatus: Wheelchair follow  Assistance:  (CGA for longer distance, close SBA for short distances.)  Quality of Gait: Narrow HORTNESIA, G reciprocal gait pattern, downward gaze, kyphotic posture.  Gait Deviations: Decreased step height, Decreased step length  Distance: 150 ft c 2 turns  Comments: G response to cues for posture and wider HORTENSIA; no response to cues to adapt gaze upward.  More Ambulation?: Yes  Ambulation 2  Surface - 2: level tile  Device 2: No device  Assistance 2: Contact guard assistance  Quality

## 2025-01-11 NOTE — PROGRESS NOTES
Physical Therapy  Facility/Department: [facility] ACUTE REHAB  Rehabilitation Physical Therapy Progress Note.    NAME: Suzan Garcia  : 1965 (59 y.o.)  MRN: [id number]  CODE STATUS: Full Code    Date of Service: 25      Past Medical History:   Diagnosis Date    Chronic back pain      Past Surgical History:   Procedure Laterality Date     SECTION      IR BIOPSY LYMPH NODE SUPERFICIAL  2024    IR BIOPSY LYMPH NODE SUPERFICIAL 2024 [facility] SPECIAL PROCEDURES       Referring Practitioner: Dr Montoya  Referral Date : 25  Diagnosis: Paraperesis    General Comments: Session ID: [id number] Language:  ID: #[id number]  Name: Katherine am session.   Session ID: [id number]  Language: Bengali   ID: #[id number]   Name: Chepe  pm session.    Restrictions/Precautions: Fall Risk;General Precautions     SUBJECTIVE  Subjective: No c/o pain in am or pm, just sore.         OBJECTIVE  Vision  Vision: Impaired  Vision Exceptions: Wears glasses for distance         Functional Mobility  Bed mobility  Rolling to Right: Supervision  Supine to Sit: Supervision  Sit to Supine: Supervision  Scooting: Supervision    Transfers  Sit to Stand: Stand by assistance (impulsive.)  Stand to Sit: Stand by assistance  Bed to Chair: Contact guard assistance (bed<>recliner CGA no device.)  Stand Pivot Transfers: Stand by assistance (c walker) to w/c from recliner.  Comment: toilet transfer sit<>stand SBA, hygiene independent, donning/doffing pants supervision    Environmental Mobility  Ambulation  Surface: Level tile  Device: Rolling Walker  Assistance: Contact guard assistance  Quality of Gait: Narrow HORTENSIA, G reciprocal gait pattern, downward gaze, kyphotic posture.  Gait Deviations: Decreased step height;Decreased step length  Distance: 150 ft c 2 turns, 210 ft c 2 turns, 15 ft x 2/ 50ft c 2 turns in p.m.    Stairs  # Steps : 10  Stairs Height: 4\" (and 6\")  Rails:  Bilateral  Device: No Device  Assistance: Contact guard assistance  Comment: Reviewed sequencing via  prior to attempt.    Propulsion 1  Propulsion: Manual  Level: Level Tile  Method: RUE;BETOE  Level of Assistance: Supervision;Minimal assistance  Description/ Details: pushing w/c while patient walks c reciprocal gait pattern.    PT Exercises  A/AROM Exercises: (B) LE supine exercise x 20 reps heel slides, SAQ SBA, hip abd/add MIN R, SBA L, SLR MIN-MOD 10 reps (B).  Resistive Exercises: seated 2.5# L, 1.5# R Marching, LAQ c SBA 20 reps.  Circulation/Endurance Exercises: 20 ankle pumps SBA  Functional Mobility Circuit Training: washing hands @ sink SBA for standing balance.  Dynamic Sitting Balance Exercises: Pt sat EOB 10 minutes to zulma shoes, supervision. sitting in w/c kicking wave zone ball c R LE x 5 minutes.  Vestibular Exercises: standing on foam eyes open x 1 minutes walker prn .  frequent rest breaks needed       Activity Tolerance: Patient limited by endurance    Decision Making: Medium Complexity  History: Stroke-like Symptoms  Exam: decreased balance, endurance, mobility; increased pain    Discharge Recommendations: Patient would benefit from continued therapy after discharge;Therapy recommended at discharge;Home with assist PRN         GOALS  Patient Goals   Patient Goals : Get stronger, want to be able to do steps.  Short Term Goals  Time Frame for Short Term Goals: 1 week  Short Term Goal 1: mod-I bed mobility  Short Term Goal 2: CGA transfers from various surfaces  Short Term Goal 3: pt able to ambulate with RW  150ft , CGA on level surfaces  Short Term Goal 4: pt to ambulate on incline surface with RW, min A  Short Term Goal 5: pt able to perform 4 to 8 steps with B rails, miin A  Short Term Goal 6: pt to propel w/c , level surfaces, distance of 150 ft, CGA , including truns to improve strength and endurance to bring herelf to therapy gym.  Long Term Goals  Time Frame for Long Term Goals :  By DC  Long Term Goal 1: Transfers mod-I from various surfaces  Long Term Goal 2: Car transfers SBA  Long Term Goal 3: Ambulation with RW/Rollator distance of 50 ft, mod-I , levle surfaces  Long Term Goal 4: Pt to ambulate distance of 30 ft x2, in incline surfaces, with RW/Rollaotr, CGA  Long Term Goal 5: Pt to ambulate distance of 150 ft (2MWT) with RW/Rollaotr on level surfaces, SBA to improve independence and endurance.  Long term goal 6: Pt able to perform flight of steps with 1 UE support, SBA.  Long term goal 7: Pt to improve Tinetti balance score from 4/28 to 21/28  atleast to reduce fall risk.  Long term goal 8: Pt to propel w/c, distance of 150 ft, level surfaces , mod-I to improve strength an endurance.    PLAN OF CARE  Frequency: 1-2 treatment sessions per day, 5-7 days per week  Physical Therapy Plan  General Plan:  minutes of therapy at least 5 out of 7 days a week  Safety Devices  Type of Devices: All fall risk precautions in place;Left in chair;Patient at risk for falls;Gait belt;Chair alarm in place;Heels elevated for pressure relief    EDUCATION  Education Given To: Patient  Education Provided: Safety  Education Method: Verbal;;Demonstration  Barriers to Learning:  (language barrier)  Education Outcome: Continued education needed    ELOS:          Therapy Time     01/11/25 0754 01/11/25 0755   PT Individual Minutes   Time In 1100 1332   Time Out 1205 1400   Minutes 65 28               Nadia Chinchilla PTA, 01/11/25 at 3:26 PM

## 2025-01-11 NOTE — PROGRESS NOTES
[facility]   IN-PATIENT SERVICE   [facility]    Consult note            Date:   2025  Patient name:  Suzan Garcia  Date of admission:  1/3/2025  7:28 PM  MRN:   [id number]  Account:  [id number]  YOB: 1965  PCP:    No primary care provider on file.  Room:   [address] [place] [address]  Code Status:    Full Code    Chief Complaint:     No chief complaint on file.      History Obtained From:     patient    History of Present Illness:     The patient is a 59 y.o.   /  female who presents with No chief complaint on file.   and she is admitted to the hospital for the management of    Patient is 59-year-old female who initially came to the hospital with left-sided facial droop and bilateral lower extremity weakness, seen by neurology, head CT CTA was negative,MRI brain showed no acute intracranial abnormalities, except for some microvascular changes.MRI cervical spine showed mild spinal canal stenosis at C6-C7 and T1-T2. MRI thoracic spine showed mild spinal stenosis at T8-T9 secondary to posterior disc protrusion. MRI lumbar spine showed central disc protrusion at L4-L5, moderately narrows the spinal canal. Posterior disc bulge at L3-L4 and mild spinal canal stenosis. Mild bilateral neural foraminal narrowing at L3-L4 and L4-L5.  LP consistent with albumin cytologic dissociation, seen by neurology, started on IVIG, completed 5  Currently admitted at [facility] for further   was used during encounter    Past Medical History:     Past Medical History:   Diagnosis Date    Chronic back pain         Past Surgical History:     Past Surgical History:   Procedure Laterality Date     SECTION      IR BIOPSY LYMPH NODE SUPERFICIAL  2024    IR BIOPSY LYMPH NODE SUPERFICIAL 2024 [facility] SPECIAL PROCEDURES        Medications Prior to Admission:     Prior to Admission medications    Medication Sig Start Date End Date Taking? Authorizing Provider  AM   Result Value Ref Range    Sodium 138 136 - 145 mmol/L    Potassium 4.1 3.7 - 5.3 mmol/L    Chloride 103 98 - 107 mmol/L    CO2 26 20 - 31 mmol/L    Anion Gap 9 9 - 16 mmol/L    Glucose 104 (H) 74 - 99 mg/dL    BUN 12 6 - 20 mg/dL    Creatinine 0.5 (L) 0.7 - 1.2 mg/dL    Est, Glom Filt Rate >90 >60 mL/min/1.73m2    Calcium 9.0 8.6 - 10.4 mg/dL   CBC auto differential    Collection Time: 01/11/25  6:40 AM   Result Value Ref Range    WBC 7.8 3.5 - 11.0 k/uL    RBC 4.18 4.0 - 5.2 m/uL    Hemoglobin 11.9 (L) 12.0 - 16.0 g/dL    Hematocrit 34.0 (L) 36 - 46 %    MCV 81.3 80 - 100 fL    MCH 28.4 26 - 34 pg    MCHC 34.9 31 - 37 g/dL    RDW 14.5 11.5 - 14.9 %    Platelets 261 150 - 450 k/uL    MPV 7.6 6.0 - 12.0 fL    Neutrophils % 72 (H) 36 - 66 %    Lymphocytes % 9 (L) 24 - 44 %    Monocytes % 5 1 - 7 %    Eosinophils % 12 (H) 0 - 4 %    Basophils % 2 0 - 2 %    Neutrophils Absolute 5.60 1.3 - 9.1 k/uL    Lymphocytes Absolute 0.70 (L) 1.0 - 4.8 k/uL    Monocytes Absolute 0.40 0.1 - 1.3 k/uL    Eosinophils Absolute 0.90 (H) 0.0 - 0.4 k/uL    Basophils Absolute 0.20 0.0 - 0.2 k/uL         Imaging/Diagnostics:        Assessment :      Primary Problem  Paraparesis (HCC)    Active Hospital Problems    Diagnosis Date Noted    Paraparesis (HCC) [G82.20] 01/03/2025       Plan:     Bilateral lower extremity weakness right much more than left and facial droop secondary to Guillain-Barré patient completed IVIG, continues to have weakness    Hypertension, blood pressure stable    Cervical adenopathy recently had biopsy negative for malignancy  Labs reviewed  Vital stable DVT prophy    Patient seen and examined  Labs, radiology, vitals reviewed blood  Patient feels leg weakness is improving  Blood pressure running good  Medications reviewed,  Denies any chest pain shortness of breath  Working with physical therapy      Consultations:   IP CONSULT TO DIETITIAN  IP CONSULT TO SOCIAL WORK  IP CONSULT TO INTERNAL MEDICINE      Hong NELSON

## 2025-01-11 NOTE — PLAN OF CARE
Problem: Discharge Planning  Goal: Discharge to home or other facility with appropriate resources  1/11/2025 1122 by Amanda Lainez RN  Outcome: Progressing     Problem: Safety - Adult  Goal: Free from fall injury  1/11/2025 1122 by Amanda Lainez RN  Outcome: Progressing     Problem: Skin/Tissue Integrity  Goal: Absence of new skin breakdown  Description: 1.  Monitor for areas of redness and/or skin breakdown  2.  Assess vascular access sites hourly  3.  Every 4-6 hours minimum:  Change oxygen saturation probe site  4.  Every 4-6 hours:  If on nasal continuous positive airway pressure, respiratory therapy assess nares and determine need for appliance change or resting period.  1/11/2025 1122 by Amanda Lainez RN  Outcome: Progressing     Problem: Pain  Goal: Verbalizes/displays adequate comfort level or baseline comfort level  1/11/2025 1122 by Amanda Lainez RN  Outcome: Progressing     Problem: ABCDS Injury Assessment  Goal: Absence of physical injury  1/11/2025 1122 by Amanda Lainez RN  Outcome: Progressing

## 2025-01-12 PROCEDURE — 6360000002 HC RX W HCPCS

## 2025-01-12 PROCEDURE — 97535 SELF CARE MNGMENT TRAINING: CPT

## 2025-01-12 PROCEDURE — 1180000000 HC REHAB R&B

## 2025-01-12 PROCEDURE — 6370000000 HC RX 637 (ALT 250 FOR IP)

## 2025-01-12 PROCEDURE — 6370000000 HC RX 637 (ALT 250 FOR IP): Performed by: PHYSICAL MEDICINE & REHABILITATION

## 2025-01-12 PROCEDURE — 99232 SBSQ HOSP IP/OBS MODERATE 35: CPT | Performed by: INTERNAL MEDICINE

## 2025-01-12 PROCEDURE — 97116 GAIT TRAINING THERAPY: CPT

## 2025-01-12 PROCEDURE — 99232 SBSQ HOSP IP/OBS MODERATE 35: CPT | Performed by: PHYSICAL MEDICINE & REHABILITATION

## 2025-01-12 PROCEDURE — 97530 THERAPEUTIC ACTIVITIES: CPT

## 2025-01-12 PROCEDURE — 6370000000 HC RX 637 (ALT 250 FOR IP): Performed by: GENERAL PRACTICE

## 2025-01-12 PROCEDURE — 97110 THERAPEUTIC EXERCISES: CPT

## 2025-01-12 PROCEDURE — 92507 TX SP LANG VOICE COMM INDIV: CPT

## 2025-01-12 RX ORDER — GABAPENTIN 300 MG/1
600 CAPSULE ORAL 3 TIMES DAILY
Status: DISCONTINUED | OUTPATIENT
Start: 2025-01-12 | End: 2025-01-16 | Stop reason: HOSPADM

## 2025-01-12 RX ADMIN — GABAPENTIN 600 MG: 300 CAPSULE ORAL at 20:30

## 2025-01-12 RX ADMIN — CARBOXYMETHYLCELLULOSE SODIUM 1 DROP: 5 SOLUTION/ DROPS OPHTHALMIC at 19:52

## 2025-01-12 RX ADMIN — ONDANSETRON 4 MG: 4 TABLET, ORALLY DISINTEGRATING ORAL at 11:05

## 2025-01-12 RX ADMIN — ENOXAPARIN SODIUM 40 MG: 100 INJECTION SUBCUTANEOUS at 07:26

## 2025-01-12 RX ADMIN — ACETAMINOPHEN 650 MG: 325 TABLET ORAL at 04:57

## 2025-01-12 RX ADMIN — ACETAMINOPHEN 650 MG: 325 TABLET ORAL at 19:51

## 2025-01-12 RX ADMIN — ACETAMINOPHEN 650 MG: 325 TABLET ORAL at 16:13

## 2025-01-12 RX ADMIN — CARBOXYMETHYLCELLULOSE SODIUM 1 DROP: 5 SOLUTION/ DROPS OPHTHALMIC at 08:16

## 2025-01-12 RX ADMIN — TIZANIDINE 4 MG: 4 TABLET ORAL at 07:26

## 2025-01-12 RX ADMIN — AMLODIPINE BESYLATE 2.5 MG: 2.5 TABLET ORAL at 07:26

## 2025-01-12 RX ADMIN — GABAPENTIN 400 MG: 400 CAPSULE ORAL at 07:27

## 2025-01-12 RX ADMIN — ROSUVASTATIN 40 MG: 40 TABLET, FILM COATED ORAL at 19:51

## 2025-01-12 RX ADMIN — Medication 6 MG: at 19:51

## 2025-01-12 ASSESSMENT — PAIN DESCRIPTION - LOCATION
LOCATION: ABDOMEN
LOCATION: BACK;ABDOMEN

## 2025-01-12 ASSESSMENT — PAIN SCALES - GENERAL
PAINLEVEL_OUTOF10: 0
PAINLEVEL_OUTOF10: 5
PAINLEVEL_OUTOF10: 3
PAINLEVEL_OUTOF10: 6

## 2025-01-12 ASSESSMENT — PAIN - FUNCTIONAL ASSESSMENT: PAIN_FUNCTIONAL_ASSESSMENT: ACTIVITIES ARE NOT PREVENTED

## 2025-01-12 ASSESSMENT — PAIN DESCRIPTION - DESCRIPTORS
DESCRIPTORS: ACHING
DESCRIPTORS: CRAMPING

## 2025-01-12 NOTE — PROGRESS NOTES
Physical Therapy  Facility/Department: [facility] ACUTE REHAB  Rehabilitation Physical Therapy Progress Note.    NAME: Suzan Garcia  : 1965 (59 y.o.)  MRN: [id number]  CODE STATUS: Full Code    Date of Service: 25      Past Medical History:   Diagnosis Date    Chronic back pain      Past Surgical History:   Procedure Laterality Date     SECTION      IR BIOPSY LYMPH NODE SUPERFICIAL  2024    IR BIOPSY LYMPH NODE SUPERFICIAL 2024 [facility] SPECIAL PROCEDURES       Referring Practitioner: Dr Montoya  Referral Date : 25  Diagnosis: Paraperesis  General  General Comments: Session ID: [id number]  Language: Nigerien   ID: #[id number]   Name: Jesi  am./ Session ID: [id number]  Language: Nigerien   ID: #[id number]   Name: Jack guillermo.    Restrictions:  Restrictions/Precautions: Fall Risk;General Precautions     SUBJECTIVE  Subjective: No c/o pain, just weak and tired.       OBJECTIVE          Functional Mobility  Bed mobility  Rolling to Left: Supervision  Rolling to Right: Supervision  Supine to Sit: Supervision  Sit to Supine: Supervision  Scooting: Supervision    Transfers  Sit to Stand: Stand by assistance  Stand to Sit: Stand by assistance  Bed to Chair: Stand by assistance (c walker recliner<>bed)  Comment: toilet transfer sit<>stand SBA, hygiene independent, donning/doffing pants independent.    Environmental Mobility  Ambulation  Surface: Level tile  Device: Rolling Walker  Other Apparatus: Wheelchair follow  Assistance: Stand by assistance  Quality of Gait: Narrow HORTENSIA, G reciprocal gait pattern, downward gaze, kyphotic posture.  Gait Deviations: Decreased step height;Decreased step length  Distance: 210 ft c 2 turns, 150 ft c 2 turns./pm 170 ft 2 turns    Stairs  # Steps : 10  Stairs Height: 4\" (and 6\")  Rails: Bilateral  Device: No Device  Assistance: Contact guard assistance  Comment: Reviewed sequencing via /patient prior to attempt.    PT  incline surfaces, with RW/Rollaotr, CGA  Long Term Goal 5: Pt to ambulate distance of 150 ft (2MWT) with RW/Rollaotr on level surfaces, SBA to improve independence and endurance.  Long term goal 6: Pt able to perform flight of steps with 1 UE support, SBA.  Long term goal 7: Pt to improve Tinetti balance score from 4/28 to 21/28  atleast to reduce fall risk.  Long term goal 8: Pt to propel w/c, distance of 150 ft, level surfaces , mod-I to improve strength an endurance.    PLAN OF CARE  Frequency: 1-2 treatment sessions per day, 5-7 days per week  Physical Therapy Plan  General Plan:  minutes of therapy at least 5 out of 7 days a week  Current Treatment Recommendations: Gait training;Stair training;Functional mobility training;Balance training;Transfer training;Therapeutic activities;Safety education & training;Endurance training  Safety Devices  Type of Devices: All fall risk precautions in place;Left in chair;Patient at risk for falls;Gait belt;Chair alarm in place;Heels elevated for pressure relief    EDUCATION  Education Given To: Patient  Education Provided: Home Exercise Program;Role of Therapy;Transfer Training;Fall Prevention Strategies;Energy Conservation  Education Method: Verbal;;Demonstration  Barriers to Learning:  (language)  Education Outcome: Continued education needed    ELOS:          Therapy Time     01/12/25 0749 01/12/25 0750   PT Individual Minutes   Time In 1055 1330   Time Out 1206 1352   Minutes 71 22               Nadia Chinchilla PTA, 01/12/25 at 3:42 PM

## 2025-01-12 NOTE — PROGRESS NOTES
[facility]   Acute Rehabilitation Occupational Therapy Daily Treatment Note    Date: 25  Patient Name: Suzan Garcia       Room: 2641/2641-01  MRN: [id number]  Account: [id number]   : 1965  (59 y.o.) Gender: female          Diagnosis: Guillain [place] Syndrome with paraparesis , cranial nerve VII palsy, Dysphagia  Additional Pertinent Hx: Suzan Garcia is a 59 y.o. right-handed female with history of HTN admitted to [facility] on 2024.       She initially presented with left facial droop and bilateral lower limb weakness for 1 day.  She had been recently admitted to [place] with cholelithiasis and thoracic and abdominal lymphadenopathy.  Upon the current presentation, MRI brain showed no acute intracranial abnormality.  MRI of the spine showed no acute abnormalities.  Lumbar puncture was done on 24.  She is currently receiving a 5-day course of IVIG for Guillain-[place] syndrome (last dose 25).     She reports ongoing bilateral lower limb weakness, right greater than left.  She also notes numbness/tingling in the bilateral lower limbs.  She states that she continues to have some weakness in the left side of her face along with speech difficulty.  She reports blurred vision as well.  per neuro MD 1-3: Bilateral, R>L, leg weakness and left CN VII palsy. In a patient with CSF albuminocytologic dissociation, consistent with Guillain-Barré syndrome.  Somewhat atypical given asymmetric findings, although similar cases have previously been reported in literature. GM AB panel negative. Symptoms improved with IVIG.    Treatment Diagnosis: Impaired self-care status due to GB with BLE weakness affecting mobility.    Past Medical History:  has a past medical history of Chronic back pain.    Past Surgical History:   has a past surgical history that includes  section and IR BIOPSY LYMPH NODE SUPERFICIAL  Mongolian copies       Assessment  Assessment  Activity Tolerance: Patient limited by endurance  Discharge Recommendations: Continue to assess pending progress;24 hour supervision or assist    Patient Education  Education  Education Given To: Patient  Education Provided: Fall Prevention Strategies;Energy Conservation;Safety;ADL Function;Mobility Training;Transfer Training;Equipment;Home Exercise Program  Education Provided Comments: basket for RW, benefits of functional exercises and activities, safety awareness  Education Method: Verbal;Demonstration;Printed Information/Hand-outs; (Mongolian copy of fine motor coordination handout provided)  Barriers to Learning: None  Education Outcome: Verbalized understanding;Demonstrated understanding;Continued education needed    OT Equipment Recommendations  Other: grab bars for shower and toilet    Safety Devices  Safety Devices in place: Yes  Type of devices: Call light within reach;Gait belt;Left in bed;Bed alarm in place (AM: left in chair with alarm on, PM: left in bed with alarm on. In AM,  lost internet connection with ~3 mins left of session. L[place] [place] notified. No issues in PM.)       Family education  To be arranged    Goals  Patient Goals   Patient goals : \"go home,  do things on my own, not depend on anybody.\"  Short Term Goals  Time Frame for Short Term Goals: 1 week  Short Term Goal 1: Pt will complete LB ADLs with supervision, Good safety, and use of AE/modified techniques as needed  Short Term Goal 2: Pt will complete functional transfers with SBA, Good safety, and least restrictive device  Short Term Goal 3: Pt will tolerate dynamic/static standing/ ambulating with least restrictive device and SBA for 7+ mins during therapeutic exercise/functional activity for improved activity tolerance  Short Term Goal 4: Pt will verbalize/demonstrate Good understanding of fall prevention/home safety modification techniques for improved safety during

## 2025-01-12 NOTE — PLAN OF CARE
Problem: Discharge Planning  Goal: Discharge to home or other facility with appropriate resources  1/12/2025 0030 by Shona Fernandez LPN  Outcome: Progressing     Problem: Safety - Adult  Goal: Free from fall injury  1/12/2025 0030 by Shona Fernandez LPN  Outcome: Progressing     Problem: Skin/Tissue Integrity  Goal: Absence of new skin breakdown  Description: 1.  Monitor for areas of redness and/or skin breakdown  2.  Assess vascular access sites hourly  3.  Every 4-6 hours minimum:  Change oxygen saturation probe site  4.  Every 4-6 hours:  If on nasal continuous positive airway pressure, respiratory therapy assess nares and determine need for appliance change or resting period.  1/12/2025 0030 by Shona Fernandez LPN  Outcome: Progressing     Problem: Pain  Goal: Verbalizes/displays adequate comfort level or baseline comfort level  1/12/2025 0030 by Shona Fernandez LPN  Outcome: Progressing     Problem: ABCDS Injury Assessment  Goal: Absence of physical injury  1/12/2025 0030 by Shona Fernandez LPN  Outcome: Progressing

## 2025-01-12 NOTE — PROGRESS NOTES
Speech Language Pathology  [facility] ACUTE REHAB    Speech-Language Treatment Note    Date: 2025  Patient’s Name: Suzan Garcia  MRN: [id number]  Diagnosis:   Patient Active Problem List   Diagnosis Code    ASCUS with positive high risk HPV cervical R87.610, R87.810    Screening mammogram for breast cancer Z12.31    Sleep disturbance G47.9    Cholelithiasis without cholangitis K80.20    Biliary colic K80.50    Cervical lymphadenopathy R59.0    Lymphadenopathy, generalized R59.1    Thrombocytopenia (HCC) D69.6    Leg weakness, bilateral R29.898    Bell's palsy G51.0    Guillain Barré syndrome (HCC) G61.0    Paraparesis (HCC) G82.20       Pain Ratin/10 (head)    Cognitive/Speech-Language Treatment    Treatment time:       SLP Individual Minutes  Time In: 0930  Time Out: 1000  Minutes: 30     Subjective: [x] Alert [x] Cooperative     [] Confused     [] Agitated    [] Lethargic    Objective/Assessment:    Recall: n/a    Word finding/Verbal expression: Pt. Required very min A to name 12 items in category (vegetables)    Speech: ST instructed pt. To complete facial exercises targeting labial strength and ROM.    Light exercise recommended for diagnosis of Guillian Daven[place].     Pt. Completed exercises x5 sets x10 reps each. Pt. Reports she is no longer experiencing anterior labial spillage when drinking, none perceived at bedside. Pt. Reports continued numbness around her mouth.  No gross lingual/labial weakness or asymmetry noted.     Pt. Reporting her speech is \"getting much better.\"  No dysarthria noted in conversation, though pt. Observed to have rapid rate of speech (normal for [place]ican Speakers).    Pt. Denies difficulty with word finding, memory and problem solving.     Other:  via language services used t/o session. Call light left within reach at end of session.     Plan:  [x] Continue ST services    [] Discharge from ST:      Discharge recommendations:  [x] Further therapy recommended at discharge.

## 2025-01-12 NOTE — PROGRESS NOTES
Physical Medicine & Rehabilitation  Progress Note      Subjective:      59 year-old female with AIDP with BLE weakness and paresthesia/neuropathic pain. Patient is observed in OT today. She notes some cramping muscular pain in posterior neck muscles and abdominal muscles. She had some associated nausea earlier which responded to prn zofran. No c/o headache today.     Evaluated with virtual  today    ROS:  Denies fevers, chills, sweats.  No chest pain, palpitations, lightheadedness.  Denies coughing, wheezing or shortness of breath.  Denies abdominal pain, nausea, diarrhea or constipation.  No new areas of joint pain.  Denies new areas of numbness or weakness.  Denies new anxiety or depression issues.  No new skin problems.    Rehabilitation:       PT:    Bed mobility  Bridging: Supervision  Rolling to Left: Supervision  Rolling to Right: Supervision  Supine to Sit: Supervision  Sit to Supine: Supervision  Scooting: Supervision  Bed Mobility Comments: Mat, wedge, 2 pillows.         Transfers  Sit to Stand: Stand by assistance (impulsive.)  Stand to Sit: Stand by assistance  Bed to Chair: Contact guard assistance (bed<>recliner CGA no device.)  Stand Pivot Transfers: Stand by assistance (c walker)  Lateral Transfers: Stand by assistance  Comment: toilet transfer sit<>stand SBA, hygiene independent, donning/doffing pants supervision         Ambulation  Surface: Level tile  Device: Rolling Walker  Other Apparatus: Wheelchair follow  Assistance: Contact guard assistance  Quality of Gait: Narrow HORTENSIA, G reciprocal gait pattern, downward gaze, kyphotic posture.  Gait Deviations: Decreased step height, Decreased step length  Distance: 150 ft c 2 turns, 210 ft c 2 turns, 15 ft x 2/ 50ft c 2 turns in p.m.  Comments: G response to cues for posture and wider HORTENSIA; no response to cues to adapt gaze upward.  More Ambulation?: Yes  Ambulation 2  Surface - 2: level tile  Device 2: No device  Assistance 2: Contact guard  and PM sessions. Use of rw to maintain balance during clothing management. performs hygiene while seated.   Toilet Transfers  Technique:  (Ambulating with rw.)  Equipment: Standard toilet, Grab bars  Additional Factors: With handrails  Assistance Level: Contact guard assist  Skilled Clinical Factors: CGA/SBA. Completed during AM and PM sessions.      SPEECH:  Subjective: [x] Alert     [x] Cooperative     [] Confused     [] Agitated    [] Lethargic     Objective/Assessment:     Recall: n/a     Word finding/Verbal expression: Pt. Required very min A to name 12 items in category (vegetables)     Speech: ST instructed pt. To complete facial exercises targeting labial strength and ROM.    Light exercise recommended for diagnosis of Guillian [place].     Pt. Completed exercises x5 sets x10 reps each. Pt. Reports she is no longer experiencing anterior labial spillage when drinking, none perceived at bedside. Pt. Reports continued numbness around her mouth.  No gross lingual/labial weakness or asymmetry noted.      Pt. Reporting her speech is \"getting much better.\"  No dysarthria noted in conversation, though pt. Observed to have rapid rate of speech (normal for Bengali Speakers).    Pt. Denies difficulty with word finding, memory and problem solving.      Other:  via language services used t/o session. Call light left within reach at end of session.     Objective:  BP (!) 167/85   Pulse 72   Temp 98.1 °F (36.7 °C) (Oral)   Resp 14   Ht 1.54 m (5' 0.63\")   Wt 59.7 kg (131 lb 9.8 oz)   LMP 10/13/2015 (Approximate) Comment: possible menopause  SpO2 99%   BMI 25.17 kg/m²       GEN: well developed, well nourished, NAD  HEENT: NCAT, PERRL, EOMI, mucous membranes pink and moist  CV: RRR, no murmurs, rubs or gallops  PULM: CTAB, no rales or rhonchi. Respirations WNL and unlabored  ABD: soft, NT, ND, BS+ and equal  NEURO: A&O x3. Sensation intact to light touch. DTRs absent BLEs  MSK: Functional ROM BUEs, weakness

## 2025-01-13 PROCEDURE — 92507 TX SP LANG VOICE COMM INDIV: CPT

## 2025-01-13 PROCEDURE — 6370000000 HC RX 637 (ALT 250 FOR IP): Performed by: GENERAL PRACTICE

## 2025-01-13 PROCEDURE — 97129 THER IVNTJ 1ST 15 MIN: CPT

## 2025-01-13 PROCEDURE — 6370000000 HC RX 637 (ALT 250 FOR IP)

## 2025-01-13 PROCEDURE — 6370000000 HC RX 637 (ALT 250 FOR IP): Performed by: PHYSICAL MEDICINE & REHABILITATION

## 2025-01-13 PROCEDURE — 6360000002 HC RX W HCPCS

## 2025-01-13 PROCEDURE — 99232 SBSQ HOSP IP/OBS MODERATE 35: CPT | Performed by: INTERNAL MEDICINE

## 2025-01-13 PROCEDURE — 1180000000 HC REHAB R&B

## 2025-01-13 PROCEDURE — 6370000000 HC RX 637 (ALT 250 FOR IP): Performed by: STUDENT IN AN ORGANIZED HEALTH CARE EDUCATION/TRAINING PROGRAM

## 2025-01-13 PROCEDURE — 97535 SELF CARE MNGMENT TRAINING: CPT

## 2025-01-13 PROCEDURE — 99232 SBSQ HOSP IP/OBS MODERATE 35: CPT | Performed by: PHYSICAL MEDICINE & REHABILITATION

## 2025-01-13 PROCEDURE — 97110 THERAPEUTIC EXERCISES: CPT

## 2025-01-13 PROCEDURE — 97530 THERAPEUTIC ACTIVITIES: CPT

## 2025-01-13 PROCEDURE — 97116 GAIT TRAINING THERAPY: CPT

## 2025-01-13 PROCEDURE — 6370000000 HC RX 637 (ALT 250 FOR IP): Performed by: INTERNAL MEDICINE

## 2025-01-13 RX ORDER — AMLODIPINE BESYLATE 5 MG/1
5 TABLET ORAL 2 TIMES DAILY
Status: DISCONTINUED | OUTPATIENT
Start: 2025-01-13 | End: 2025-01-16 | Stop reason: HOSPADM

## 2025-01-13 RX ADMIN — CARBOXYMETHYLCELLULOSE SODIUM 1 DROP: 5 SOLUTION/ DROPS OPHTHALMIC at 12:48

## 2025-01-13 RX ADMIN — ACETAMINOPHEN 650 MG: 325 TABLET ORAL at 12:48

## 2025-01-13 RX ADMIN — ACETAMINOPHEN 650 MG: 325 TABLET ORAL at 20:40

## 2025-01-13 RX ADMIN — BUTALBITA,ACETAMINOPHEN AND CAFFEINE 1 CAPSULE: 50; 300; 40 CAPSULE ORAL at 16:19

## 2025-01-13 RX ADMIN — CARBOXYMETHYLCELLULOSE SODIUM 1 DROP: 5 SOLUTION/ DROPS OPHTHALMIC at 20:47

## 2025-01-13 RX ADMIN — POLYETHYLENE GLYCOL 3350 17 G: 17 POWDER, FOR SOLUTION ORAL at 07:37

## 2025-01-13 RX ADMIN — GABAPENTIN 600 MG: 300 CAPSULE ORAL at 20:41

## 2025-01-13 RX ADMIN — AMLODIPINE BESYLATE 5 MG: 5 TABLET ORAL at 20:41

## 2025-01-13 RX ADMIN — CARBOXYMETHYLCELLULOSE SODIUM 1 DROP: 5 SOLUTION/ DROPS OPHTHALMIC at 07:38

## 2025-01-13 RX ADMIN — ROSUVASTATIN 40 MG: 40 TABLET, FILM COATED ORAL at 20:41

## 2025-01-13 RX ADMIN — CARBOXYMETHYLCELLULOSE SODIUM 1 DROP: 5 SOLUTION/ DROPS OPHTHALMIC at 16:20

## 2025-01-13 RX ADMIN — AMLODIPINE BESYLATE 2.5 MG: 2.5 TABLET ORAL at 07:37

## 2025-01-13 RX ADMIN — TIZANIDINE 4 MG: 4 TABLET ORAL at 20:40

## 2025-01-13 RX ADMIN — ACETAMINOPHEN 650 MG: 325 TABLET ORAL at 05:08

## 2025-01-13 RX ADMIN — Medication 6 MG: at 20:41

## 2025-01-13 RX ADMIN — GABAPENTIN 600 MG: 300 CAPSULE ORAL at 13:32

## 2025-01-13 RX ADMIN — ENOXAPARIN SODIUM 40 MG: 100 INJECTION SUBCUTANEOUS at 07:37

## 2025-01-13 RX ADMIN — GABAPENTIN 600 MG: 300 CAPSULE ORAL at 07:37

## 2025-01-13 ASSESSMENT — PAIN - FUNCTIONAL ASSESSMENT: PAIN_FUNCTIONAL_ASSESSMENT: ACTIVITIES ARE NOT PREVENTED

## 2025-01-13 ASSESSMENT — PAIN SCALES - GENERAL
PAINLEVEL_OUTOF10: 3
PAINLEVEL_OUTOF10: 4

## 2025-01-13 ASSESSMENT — PAIN DESCRIPTION - DESCRIPTORS: DESCRIPTORS: CRAMPING;ACHING

## 2025-01-13 ASSESSMENT — PAIN DESCRIPTION - LOCATION: LOCATION: ABDOMEN

## 2025-01-13 NOTE — PROGRESS NOTES
[facility]   IN-PATIENT SERVICE   [facility]    Consult note            Date:   2025  Patient name:  Suzan Garcia  Date of admission:  1/3/2025  7:28 PM  MRN:   [id number]  Account:  [id number]  YOB: 1965  PCP:    No primary care provider on file.  Room:   [address] [place] [address]  Code Status:    Full Code    Chief Complaint:     No chief complaint on file.      History Obtained From:     patient    History of Present Illness:     The patient is a 59 y.o.   /  female who presents with No chief complaint on file.   and she is admitted to the hospital for the management of    Patient is 59-year-old female who initially came to the hospital with left-sided facial droop and bilateral lower extremity weakness, seen by neurology, head CT CTA was negative,MRI brain showed no acute intracranial abnormalities, except for some microvascular changes.MRI cervical spine showed mild spinal canal stenosis at C6-C7 and T1-T2. MRI thoracic spine showed mild spinal stenosis at T8-T9 secondary to posterior disc protrusion. MRI lumbar spine showed central disc protrusion at L4-L5, moderately narrows the spinal canal. Posterior disc bulge at L3-L4 and mild spinal canal stenosis. Mild bilateral neural foraminal narrowing at L3-L4 and L4-L5.  LP consistent with albumin cytologic dissociation, seen by neurology, started on IVIG, completed 5  Currently admitted at [facility] for further   was used during encounter    Past Medical History:     Past Medical History:   Diagnosis Date    Chronic back pain         Past Surgical History:     Past Surgical History:   Procedure Laterality Date     SECTION      IR BIOPSY LYMPH NODE SUPERFICIAL  2024    IR BIOPSY LYMPH NODE SUPERFICIAL 2024 [facility] SPECIAL PROCEDURES        Medications Prior to Admission:     Prior to Admission medications    Medication Sig Start Date End Date Taking? Authorizing Provider

## 2025-01-13 NOTE — PATIENT CARE CONFERENCE
Comments: Sleeps in a flat bed at home  Additional Comments: Spouse for for construction company,, does not work this time of the year due to weather, he will be available to assist as needed at home. Daughter works part time.   -----------------------------------------------------------------------------------------------------  Family Education: Need to make contact with family to initiate education    Percentage Risk for Readmission: Low 0 - 18%     [facility] RISK OF UNPLANNED READMISSION 2.0             15.9 Total Score        Critical Items: None       Problem / Barrier Intervention / Plan  Results   Impaired cognition Cognitive retraining exercises     L facial weakness Facial exercises addressing ROM and strength     Impaired functional mobility  Strengthening, endurance training, neuro re-education and functional mobility and gait re-training using AD. Pt and family education and training     Altered ability to care for self Remediation and training in modified care strategies to increase safety and independence with self-care tasks                               Total SELF CARE Score Admission Score:  27   Discharge Goal:  42 / 42      Total MOBILITY Score Admission Score:  27 Discharge Goal:  75 / 90           THERAPY MINUTE COMPLIANCE  Patient is participating and compliant with meeting therapy minutes as outlined in plan of care: Yes`    Discharge Plan   Estimated Discharge Date: 1/16/2025  Home evaluation needed? No  Overnight or Day Pass: No  Factors facilitating achievement of predicted outcomes: Family support, Motivated, Cooperative, Pleasant, and Has homemaker services  Barriers to the achievement of predicted outcomes: Pain, Decreased endurance, Decreased sensation, Lower extremity weakness, Medical complications, Stairs at home, and Medication managment    Functional Goals at Discharge:    Predicted Outcome: Predicted Outcome: Home with family  Level of Assistance: ARU Patients Level of Assistance:  Modified [place]  Discharge therapy goals:  PT: Long Term Goals  Time Frame for Long Term Goals : By DC  Long Term Goal 1: Transfers mod-I from various surfaces  Long Term Goal 2: Car transfers SBA  Long Term Goal 3: Ambulation with RW/Rollator distance of 50 ft, mod-I , levle surfaces  Long Term Goal 4: Pt to ambulate distance of 30 ft x2, in incline surfaces, with RW/Rollaotr, CGA  Long Term Goal 5: Pt to ambulate distance of 150 ft (2MWT) with RW/Rollaotr on level surfaces, SBA to improve independence and endurance.  Long term goal 6: Pt able to perform flight of steps with 1 UE support, SBA.  Long term goal 7: Pt to improve Tinetti balance score from 4/28 to 21/28  atleast to reduce fall risk.  Long term goal 8: Pt to propel w/c, distance of 150 ft, level surfaces , mod-I to improve strength an endurance.  OT:Long Term Goals  Time Frame for Long Term Goals : upon dc  Long Term Goal 1: mod indep amb to obtain set up for adls with good safe techniques using least restrictive device.  Long Term Goal 2: Pt will complete LB ADLs with CATHERINE, Good safety, and use of AE/modified techniques as needed  Long Term Goal 3: Pt will complete functional mobility/transfers with CATHERINE, Good safety, and least restrictive device  Long Term Goal 4: Pt will tolerate dynamic/static standing/ ambulating with least restrictive device and mod indep for12+ mins during therapeutic exercise/functional activity for improved activity tolerance  Long Term Goal 5: mod indep simple advanced adls with good balance and safety.  Additional Goals?: Yes  Long Term Goal 6: increase R  from 35 to 38 and L  from 28 to 32 to increase hand strength for adls.  Long Term Goal 7: Pt will improve 9 Hole Peg test time by 10 seconds bilaterally to indicate improve fine motor coordination for self-care activities. (Goal added by Rosalia Massey, OTR/L 1/12/25)  ST: Problem Solving: Independent, Memory: Mod I    Treating Interdisciplinary Team  Professionals/Participating Team Members with current knowledge of Suzan Garcia:  /:   Herminia Turner RN  Occupational Therapist: Kath Regalado OT   Physical Therapist: Cynthia Avilez PT  Speech Therapist:  Sulma Kumar M.S. CF-SLP  Nurse: Amada Trinidad RN   Dietary/Nutrition: Kalli Sandoval RD, LD  Pastoral Care: Deacon Alcazar - Director of [place], [facility] [place]  CMG: Helen Ashford RN    I attest to leading the interdisciplinary team meeting, required attendees are present as listed above, I approve the established interdisciplinary plan of care as documented within the medical record of Suzan EVIE Garcia. Progressing and pain is improving. Family training today. Anticipate can discharge home Thursday.     Rita Marquez MD

## 2025-01-13 NOTE — PLAN OF CARE
Problem: Discharge Planning  Goal: Discharge to home or other facility with appropriate resources  1/13/2025 1531 by Erlinda Magallanes RN  Outcome: Progressing  1/13/2025 0533 by Saida Bowden RN  Outcome: Progressing  Flowsheets (Taken 1/12/2025 2115)  Discharge to home or other facility with appropriate resources: Identify barriers to discharge with patient and caregiver     Problem: Safety - Adult  Goal: Free from fall injury  1/13/2025 1531 by Erlinda Magallanes RN  Outcome: Progressing  1/13/2025 0533 by Saida Bowden RN  Outcome: Progressing     Problem: Skin/Tissue Integrity  Goal: Absence of new skin breakdown  Description: 1.  Monitor for areas of redness and/or skin breakdown  2.  Assess vascular access sites hourly  3.  Every 4-6 hours minimum:  Change oxygen saturation probe site  4.  Every 4-6 hours:  If on nasal continuous positive airway pressure, respiratory therapy assess nares and determine need for appliance change or resting period.  1/13/2025 1531 by Erlinda Magallanes RN  Outcome: Progressing  1/13/2025 0533 by Saida Bowden RN  Outcome: Progressing     Problem: Pain  Goal: Verbalizes/displays adequate comfort level or baseline comfort level  1/13/2025 1531 by Erlinda Magallanes RN  Outcome: Progressing  1/13/2025 0533 by Saida Bowden RN  Outcome: Progressing  Flowsheets (Taken 1/12/2025 2050)  Verbalizes/displays adequate comfort level or baseline comfort level:   Encourage patient to monitor pain and request assistance   Assess pain using appropriate pain scale   Administer analgesics based on type and severity of pain and evaluate response   Implement non-pharmacological measures as appropriate and evaluate response     Problem: ABCDS Injury Assessment  Goal: Absence of physical injury  1/13/2025 1531 by Erlinda Magallanes RN  Outcome: Progressing  1/13/2025 0533 by Saida Bowden RN  Outcome: Progressing

## 2025-01-13 NOTE — PROGRESS NOTES
[facility]   Acute Rehabilitation Occupational Therapy Daily Treatment Note    Date: 25  Patient Name: Suzan Garcia       Room: 2641/2641-01  MRN: [id number]  Account: [id number]   : 1965  (59 y.o.) Gender: female          Diagnosis: Guillain Sumner Syndrome with paraparesis , cranial nerve VII palsy, Dysphagia  Additional Pertinent Hx: Suzan Garcia is a 59 y.o. right-handed female with history of HTN admitted to [facility] on 2024.       She initially presented with left facial droop and bilateral lower limb weakness for 1 day.  She had been recently admitted to [place] with cholelithiasis and thoracic and abdominal lymphadenopathy.  Upon the current presentation, MRI brain showed no acute intracranial abnormality.  MRI of the spine showed no acute abnormalities.  Lumbar puncture was done on 24.  She is currently receiving a 5-day course of IVIG for Guillain-Sumner syndrome (last dose 25).     She reports ongoing bilateral lower limb weakness, right greater than left.  She also notes numbness/tingling in the bilateral lower limbs.  She states that she continues to have some weakness in the left side of her face along with speech difficulty.  She reports blurred vision as well.  per neuro MD 1-3: Bilateral, R>L, leg weakness and left CN VII palsy. In a patient with CSF albuminocytologic dissociation, consistent with Guillain-Barré syndrome.  Somewhat atypical given asymmetric findings, although similar cases have previously been reported in literature. GM AB panel negative. Symptoms improved with IVIG.    Treatment Diagnosis: Impaired self-care status due to GB with BLE weakness affecting mobility.    Past Medical History:  has a past medical history of Chronic back pain.    Past Surgical History:   has a past surgical history that includes  section and IR BIOPSY LYMPH NODE SUPERFICIAL  Self-Care / ADL, Group Therapy      OT Individual Minutes       01/13/25 0900 01/13/25 1500   OT Individual Minutes   Time In 0903 1415   Time Out 1001 1446   Minutes 58 31   Minute Variance   Variance  --  1  (using one minute from speech therapy)         Electronically signed by YOSSI Rai on 1/13/25 at 3:14 PM EST

## 2025-01-13 NOTE — PROGRESS NOTES
Physical Medicine & Rehabilitation  Progress Note      Subjective:      59 year-old female with AIDP with BLE weakness and paresthesia/neuropathic pain. Patient is noting improvement with neurologic pain today. No further headache. No relief of muscle pain with heating pad - she was unable to tolerate the heating pad. Some reduced appetite, but otherwise doing well today.     Evaluated with virtual  today    ROS:  Denies fevers, chills, sweats.  No chest pain, palpitations, lightheadedness.  Denies coughing, wheezing or shortness of breath.  Denies abdominal pain, nausea, diarrhea or constipation.  No new areas of joint pain.  Denies new areas of numbness or weakness.  Denies new anxiety or depression issues.  No new skin problems.    Rehabilitation:       PT:    Bed mobility  Bridging: Supervision  Rolling to Left: Supervision  Rolling to Right: Supervision  Supine to Sit: Supervision  Sit to Supine: Supervision  Scooting: Supervision  Bed Mobility Comments: Mat, wedge, 2 pillows.         Transfers  Sit to Stand: Stand by assistance  Stand to Sit: Stand by assistance  Bed to Chair: Stand by assistance (c walker recliner<>bed)  Stand Pivot Transfers: Stand by assistance (c walker)  Lateral Transfers: Stand by assistance  Comment: toilet transfer sit<>stand SBA, hygiene independent, donning/doffing pants independent.         Ambulation  Surface: Level tile  Device: Rolling Walker  Other Apparatus: Wheelchair follow  Assistance: Stand by assistance  Quality of Gait: Narrow HORTENSIA, G reciprocal gait pattern, downward gaze, kyphotic posture.  Gait Deviations: Decreased step height, Decreased step length  Distance: 210 ft c 2 turns, 150 ft c 2 turns./pm 170 ft 2 turns  Comments: G response to cues for posture and wider HORTENSIA; no response to cues to adapt gaze upward.  More Ambulation?: Yes  Ambulation 2  Surface - 2: level tile  Device 2: No device  Assistance 2: Contact guard assistance  Quality of Gait 2:

## 2025-01-13 NOTE — PROGRESS NOTES
Comprehensive Nutrition Assessment    Type and Reason for Visit:  Reassess    Nutrition Recommendations/Plan:   Continue current diet.   Obtain wt on different scale if possible.      Malnutrition Assessment:  Malnutrition Status:  Insufficient data (01/06/25 1346)    Context:  Acute Illness       Nutrition Assessment:     states pt said that she is getting enough to eat and is satisfied with the meals provided. Nurse stated earlier that pt did not eat much breakfast, but die eat well at lunch today. Pt indicated normal wt is around 160-165lb. She does not suspect wt loss but is unsure of current wt. Pt sitting in recliner at time of visit- unable to verify wt. As mentioned in previous RD note, actual wt may be closer to 157lb. Chart review indicates nausea at times; medication seems to help.    Nutrition Related Findings:    No edema. Meds and labs reviewed. Wound Type: Surgical Incision       Current Nutrition Intake & Therapies:    Average Meal Intake: 51-75%, %  Average Supplements Intake: None Ordered  ADULT DIET; Regular    Anthropometric Measures:  Height: 154 cm (5' 0.63\")  Ideal Body Weight (IBW): 103 lbs (47 kg)    Admission Body Weight: 59.7 kg (131 lb 9.8 oz)  Current Body Weight: 59.8 kg (131 lb 13.4 oz) (question accuracy), 127.8 % IBW. Weight Source: Bed scale  Current BMI (kg/m2): 25.2  Usual Body Weight: 71 kg (156 lb 8.4 oz)     % Weight Change (Calculated): -15.9  Weight Adjustment For: No Adjustment                 BMI Categories: Overweight (BMI 25.0-29.9)    Estimated Daily Nutrient Needs:  Energy Requirements Based On: Formula  Weight Used for Energy Requirements: Current  Energy (kcal/day): 4461-8101 kcal/day  Weight Used for Protein Requirements: Current  Protein (g/day): 55-65 g/day  Method Used for Fluid Requirements: ml/Kg  Fluid (ml/day): 7589-4114 kcal/day    Nutrition Diagnosis:   No nutrition diagnosis at this time     Nutrition Interventions:   Food and/or Nutrient  Delivery: Continue Current Diet  Nutrition Education/Counseling: No recommendation at this time  Coordination of Nutrition Care: Continue to monitor while inpatient  Plan of Care discussed with: Nurse    Goals:  Goals: PO intake 75% or greater  Type of Goal: Continue current goal  Previous Goal Met: Progressing toward Goal(s)    Nutrition Monitoring and Evaluation:   Behavioral-Environmental Outcomes: None Identified  Food/Nutrient Intake Outcomes: Food and Nutrient Intake  Physical Signs/Symptoms Outcomes: Biochemical Data, GI Status, Nutrition Focused Physical Findings, Weight    Discharge Planning:    Continue current diet     Kalli Sandoval RD, LD  Contact: [phone]

## 2025-01-13 NOTE — PROGRESS NOTES
Physical Therapy  Facility/Department: [facility] ACUTE REHAB  Rehabilitation Physical Therapy Progress Note.  NAME: Suzan Garcia  : 1965 (59 y.o.)  MRN: [id number]  CODE STATUS: Full Code    Date of Service: 25      Past Medical History:   Diagnosis Date    Chronic back pain      Past Surgical History:   Procedure Laterality Date     SECTION      IR BIOPSY LYMPH NODE SUPERFICIAL  2024    IR BIOPSY LYMPH NODE SUPERFICIAL 2024 [facility] SPECIAL PROCEDURES       Referring Practitioner: Dr Montoya  Referral Date : 25  Diagnosis: Paraperesis    General Comments: Session ID: [id number]  Language: Liberian   ID: #[id number]   Name: Eugenia damon pm Session ID: [id number]  Language: Liberian   ID: #[id number]   Name: Hansa    Restrictions:  Restrictions/Precautions: Fall Risk;General Precautions     SUBJECTIVE  Subjective: No c/o pain, just weak and tired.           OBJECTIVE                Functional Mobility  Bed mobility  Rolling to Left: Independent  Rolling to Right: Independent  Supine to Sit: Independent  Sit to Supine: Independent    Transfers  Sit to Stand: Independent  Stand to Sit: Independent  Bed to Chair: Stand by assistance (recliner> bed no device.)  Stand Pivot Transfers: Independent (c walker w/c>recliner)    Environmental Mobility  Ambulation  Surface: Level tile;Ramp  Device: Rolling Walker  Assistance: Stand by assistance  Quality of Gait: Narrow HORTENSIA, G reciprocal gait pattern, downward gaze, kyphotic posture.  Gait Deviations: Decreased step height;Decreased step length  Distance: 10 ft, 200 ft c 2 turns, 300 ft.    Ambulation 2  Surface - 2: level tile  Device 2: Single point cane  Other Apparatus 2:  (w/c follow)  Assistance 2: Contact guard assistance  Quality of Gait 2: Hesitant, R arm high gurard education for swing of R arm. good recripical gait pattern  Gait Deviations: Slow No;Deviated path;Decreased arm swing;Decreased step

## 2025-01-14 PROCEDURE — 97530 THERAPEUTIC ACTIVITIES: CPT

## 2025-01-14 PROCEDURE — 6370000000 HC RX 637 (ALT 250 FOR IP): Performed by: PHYSICAL MEDICINE & REHABILITATION

## 2025-01-14 PROCEDURE — 6370000000 HC RX 637 (ALT 250 FOR IP): Performed by: INTERNAL MEDICINE

## 2025-01-14 PROCEDURE — 97130 THER IVNTJ EA ADDL 15 MIN: CPT

## 2025-01-14 PROCEDURE — 6360000002 HC RX W HCPCS

## 2025-01-14 PROCEDURE — 97116 GAIT TRAINING THERAPY: CPT

## 2025-01-14 PROCEDURE — 6370000000 HC RX 637 (ALT 250 FOR IP): Performed by: STUDENT IN AN ORGANIZED HEALTH CARE EDUCATION/TRAINING PROGRAM

## 2025-01-14 PROCEDURE — 97129 THER IVNTJ 1ST 15 MIN: CPT

## 2025-01-14 PROCEDURE — 99232 SBSQ HOSP IP/OBS MODERATE 35: CPT | Performed by: INTERNAL MEDICINE

## 2025-01-14 PROCEDURE — 97542 WHEELCHAIR MNGMENT TRAINING: CPT

## 2025-01-14 PROCEDURE — 99232 SBSQ HOSP IP/OBS MODERATE 35: CPT | Performed by: PHYSICAL MEDICINE & REHABILITATION

## 2025-01-14 PROCEDURE — 97110 THERAPEUTIC EXERCISES: CPT

## 2025-01-14 PROCEDURE — 6370000000 HC RX 637 (ALT 250 FOR IP): Performed by: GENERAL PRACTICE

## 2025-01-14 PROCEDURE — 6370000000 HC RX 637 (ALT 250 FOR IP)

## 2025-01-14 PROCEDURE — 1180000000 HC REHAB R&B

## 2025-01-14 PROCEDURE — 97535 SELF CARE MNGMENT TRAINING: CPT

## 2025-01-14 RX ORDER — TRAZODONE HYDROCHLORIDE 50 MG/1
25 TABLET, FILM COATED ORAL NIGHTLY
Status: DISCONTINUED | OUTPATIENT
Start: 2025-01-14 | End: 2025-01-16 | Stop reason: HOSPADM

## 2025-01-14 RX ORDER — LISINOPRIL 20 MG/1
20 TABLET ORAL DAILY
Status: DISCONTINUED | OUTPATIENT
Start: 2025-01-14 | End: 2025-01-16 | Stop reason: HOSPADM

## 2025-01-14 RX ADMIN — TRAZODONE HYDROCHLORIDE 25 MG: 50 TABLET ORAL at 19:56

## 2025-01-14 RX ADMIN — GABAPENTIN 600 MG: 300 CAPSULE ORAL at 07:52

## 2025-01-14 RX ADMIN — ACETAMINOPHEN 650 MG: 325 TABLET ORAL at 19:55

## 2025-01-14 RX ADMIN — ACETAMINOPHEN 650 MG: 325 TABLET ORAL at 05:45

## 2025-01-14 RX ADMIN — LISINOPRIL 20 MG: 20 TABLET ORAL at 16:23

## 2025-01-14 RX ADMIN — ACETAMINOPHEN 650 MG: 325 TABLET ORAL at 13:42

## 2025-01-14 RX ADMIN — GABAPENTIN 600 MG: 300 CAPSULE ORAL at 19:55

## 2025-01-14 RX ADMIN — GABAPENTIN 600 MG: 300 CAPSULE ORAL at 13:42

## 2025-01-14 RX ADMIN — TIZANIDINE 4 MG: 4 TABLET ORAL at 19:57

## 2025-01-14 RX ADMIN — ROSUVASTATIN 40 MG: 40 TABLET, FILM COATED ORAL at 19:55

## 2025-01-14 RX ADMIN — AMLODIPINE BESYLATE 5 MG: 5 TABLET ORAL at 19:55

## 2025-01-14 RX ADMIN — CARBOXYMETHYLCELLULOSE SODIUM 1 DROP: 5 SOLUTION/ DROPS OPHTHALMIC at 13:45

## 2025-01-14 RX ADMIN — AMLODIPINE BESYLATE 5 MG: 5 TABLET ORAL at 07:52

## 2025-01-14 RX ADMIN — CARBOXYMETHYLCELLULOSE SODIUM 1 DROP: 5 SOLUTION/ DROPS OPHTHALMIC at 18:20

## 2025-01-14 RX ADMIN — CARBOXYMETHYLCELLULOSE SODIUM 1 DROP: 5 SOLUTION/ DROPS OPHTHALMIC at 07:53

## 2025-01-14 RX ADMIN — ENOXAPARIN SODIUM 40 MG: 100 INJECTION SUBCUTANEOUS at 07:52

## 2025-01-14 RX ADMIN — TIZANIDINE 4 MG: 4 TABLET ORAL at 05:45

## 2025-01-14 ASSESSMENT — PAIN DESCRIPTION - ORIENTATION
ORIENTATION: MID;UPPER
ORIENTATION: LOWER

## 2025-01-14 ASSESSMENT — PAIN SCALES - GENERAL
PAINLEVEL_OUTOF10: 6
PAINLEVEL_OUTOF10: 7
PAINLEVEL_OUTOF10: 0

## 2025-01-14 ASSESSMENT — PAIN DESCRIPTION - LOCATION
LOCATION: BACK
LOCATION: BACK

## 2025-01-14 ASSESSMENT — PAIN - FUNCTIONAL ASSESSMENT: PAIN_FUNCTIONAL_ASSESSMENT: ACTIVITIES ARE NOT PREVENTED

## 2025-01-14 ASSESSMENT — PAIN DESCRIPTION - DESCRIPTORS
DESCRIPTORS: ACHING
DESCRIPTORS: ACHING

## 2025-01-14 NOTE — PROGRESS NOTES
Physical Therapy  Facility/Department: [facility] ACUTE REHAB  Treatment note    NAME: Suzan Garcia  : 1965 (59 y.o.)  MRN: [id number]  CODE STATUS: Full Code  Date of Service: 25    Past Medical History:   Diagnosis Date    Chronic back pain      Past Surgical History:   Procedure Laterality Date     SECTION      IR BIOPSY LYMPH NODE SUPERFICIAL  2024    IR BIOPSY LYMPH NODE SUPERFICIAL 2024 [facility] SPECIAL PROCEDURES     Family/Caregiver Present: Yes (spouse Freedom & benji)  General  General Comments: Multiple interpreters used AM & PM due to technical glitches. Daughter present in PM c/o 's lack of inflection, Pt did not know writer was asking question due to their \"flat\" delivery.    Restrictions:  Restrictions/Precautions: Fall Risk;General Precautions     SUBJECTIVE  Subjective: Pt states LE weakness is about 6/10 (10/10 when she fell previously); w/exercise/activity, LEs feel weak, then have tremors then there's relief (from feeling of weakness).     OBJECTIVE  Functional Mobility  Bed mobility  Sit to Supine: Modified independent  Scooting: Modified independent  Bed Mobility Comments: Bed flat, 2 pillows.  Transfers  Sit to Stand: Independent  Stand to Sit: Independent  Bed to Chair: Stand by assistance (recliner> bed no device.)  Stand Pivot Transfers: Independent (c walker w/c>recliner)  Car Transfer: Supervision (Pt self-assisting LEs into passenger side of car simulator, seat height ~30\". Spouse & daughter present for family training, present for instruction to sit on seat first in lieu of lifting single LE into car prior to sitting; G return demonstration.)  Comment: RW used for most tranfers, unless noted otherwise.  Balance  Posture: Fair  Sitting - Static: Good (EOB)  Sitting - Dynamic: Good (EOB)  Standing - Static: Fair;+ (no device)  Standing - Dynamic: Fair (RW)    Environmental Mobility  Ambulation  Surface: Level tile;Ramp  Device: Rolling

## 2025-01-14 NOTE — PROGRESS NOTES
[facility]   IN-PATIENT SERVICE   [facility]    Consult note            Date:   2025  Patient name:  Suzan Garcia  Date of admission:  1/3/2025  7:28 PM  MRN:   [id number]  Account:  [id number]  YOB: 1965  PCP:    No primary care provider on file.  Room:   [address] [place] [address]  Code Status:    Full Code    Chief Complaint:     No chief complaint on file.      History Obtained From:     patient    History of Present Illness:     The patient is a 59 y.o.   /  female who presents with No chief complaint on file.   and she is admitted to the hospital for the management of    Patient is 59-year-old female who initially came to the hospital with left-sided facial droop and bilateral lower extremity weakness, seen by neurology, head CT CTA was negative,MRI brain showed no acute intracranial abnormalities, except for some microvascular changes.MRI cervical spine showed mild spinal canal stenosis at C6-C7 and T1-T2. MRI thoracic spine showed mild spinal stenosis at T8-T9 secondary to posterior disc protrusion. MRI lumbar spine showed central disc protrusion at L4-L5, moderately narrows the spinal canal. Posterior disc bulge at L3-L4 and mild spinal canal stenosis. Mild bilateral neural foraminal narrowing at L3-L4 and L4-L5.  LP consistent with albumin cytologic dissociation, seen by neurology, started on IVIG, completed 5  Currently admitted at [facility] for further   was used during encounter    Past Medical History:     Past Medical History:   Diagnosis Date    Chronic back pain         Past Surgical History:     Past Surgical History:   Procedure Laterality Date     SECTION      IR BIOPSY LYMPH NODE SUPERFICIAL  2024    IR BIOPSY LYMPH NODE SUPERFICIAL 2024 [facility] SPECIAL PROCEDURES        Medications Prior to Admission:     Prior to Admission medications    Medication Sig Start Date End Date Taking? Authorizing Provider

## 2025-01-14 NOTE — PLAN OF CARE
Problem: Discharge Planning  Goal: Discharge to home or other facility with appropriate resources  1/14/2025 1010 by Jagjit De La Fuente LPN  Outcome: Progressing  1/14/2025 0111 by Shea Huang, RN  Outcome: Progressing     Problem: Safety - Adult  Goal: Free from fall injury  1/14/2025 1010 by Jagjit De La Fuente LPN  Outcome: Progressing  1/14/2025 0111 by Shea Huang, RN  Outcome: Progressing     Problem: Skin/Tissue Integrity  Goal: Absence of new skin breakdown  Description: 1.  Monitor for areas of redness and/or skin breakdown  2.  Assess vascular access sites hourly  3.  Every 4-6 hours minimum:  Change oxygen saturation probe site  4.  Every 4-6 hours:  If on nasal continuous positive airway pressure, respiratory therapy assess nares and determine need for appliance change or resting period.  1/14/2025 1010 by Jagjit De La Fuente LPN  Outcome: Progressing  1/14/2025 0111 by Shea Huang, RN  Outcome: Progressing     Problem: Pain  Goal: Verbalizes/displays adequate comfort level or baseline comfort level  1/14/2025 1010 by Jagjit De La Fuente LPN  Outcome: Progressing  1/14/2025 0111 by Shea Huang, RN  Outcome: Progressing  Flowsheets (Taken 1/12/2025 2050 by Saida Bowden, RN)  Verbalizes/displays adequate comfort level or baseline comfort level:   Encourage patient to monitor pain and request assistance   Assess pain using appropriate pain scale   Administer analgesics based on type and severity of pain and evaluate response   Implement non-pharmacological measures as appropriate and evaluate response     Problem: ABCDS Injury Assessment  Goal: Absence of physical injury  1/14/2025 1010 by Jagjit De La Fuente LPN  Outcome: Progressing  1/14/2025 0111 by Shea Huang, RN  Outcome: Progressing  Flowsheets (Taken 1/3/2025 1950 by Neno Lagos, RN)  Absence of Physical Injury: Implement safety measures based on patient assessment

## 2025-01-14 NOTE — PROGRESS NOTES
Speech Language Pathology  [facility]UTE [facility]    Cognitive/Speech-Language Treatment Note    Date: 1/14/2025  Patient’s Name: Suzan Garcia  MRN: [id number]  Diagnosis:   Patient Active Problem List   Diagnosis Code    ASCUS with positive high risk HPV cervical R87.610, R87.810    Screening mammogram for breast cancer Z12.31    Sleep disturbance G47.9    Cholelithiasis without cholangitis K80.20    Biliary colic K80.50    Cervical lymphadenopathy R59.0    Lymphadenopathy, generalized R59.1    Thrombocytopenia (HCC) D69.6    Leg weakness, bilateral R29.898    Bell's palsy G51.0    AIDP (acute inflammatory demyelinating polyneuropathy) ([facility]) G61.0    Paraparesis ([facility]) G82.20       Pain Rating: None reported    Cognitive/Speech-Language Treatment    Treatment time:       SLP Individual Minutes  Time In: 1255  Time Out: 1325  Minutes: 30       Subjective: [x] Alert [x] Cooperative     [] Confused     [] Agitated    [] Lethargic      Objective/Assessment:    Recall: -Word List Retention (category exclusion): 7/10 increased to 10/10 with repetition of stimuli or min verbal cues    Problem Solving/Reasoning: -Word deductions: 15/16 increasing to 16/16 with mild verbal cue     Speech: -Conversational speech appeared fast in nature which is typical of individuals who speak Guamanian as primary language. Did not appear to have any word-finding deficits per . Reviewed OMEX and encouraged independent completion outside of skilled tx.     Other:  via family member completed this date. Offered use of , however daughter willing to so \"she could practice\" her Georgian. Call light left within reach at end of session.     Plan:  [x] Continue ST services    [] Discharge from ST:      Discharge recommendations:  [] Further therapy recommended at discharge.   [x] No therapy recommended at discharge.      Treatment completed by: Caroline Sahu M.A., CCC-SLP

## 2025-01-14 NOTE — PLAN OF CARE
Problem: Discharge Planning  Goal: Discharge to home or other facility with appropriate resources  1/14/2025 0111 by Shea Huang, RN  Outcome: Progressing     Problem: Safety - Adult  Goal: Free from fall injury  1/14/2025 0111 by Shea Huang, RN  Outcome: Progressing     Problem: Skin/Tissue Integrity  Goal: Absence of new skin breakdown  Description: 1.  Monitor for areas of redness and/or skin breakdown  2.  Assess vascular access sites hourly  3.  Every 4-6 hours minimum:  Change oxygen saturation probe site  4.  Every 4-6 hours:  If on nasal continuous positive airway pressure, respiratory therapy assess nares and determine need for appliance change or resting period.  1/14/2025 0111 by Shea Huang, RN  Outcome: Progressing     Problem: Pain  Goal: Verbalizes/displays adequate comfort level or baseline comfort level  1/14/2025 0111 by Shea Huang, RN  Outcome: Progressing  Flowsheets (Taken 1/12/2025 2050 by Saida Bowden, RN)  Verbalizes/displays adequate comfort level or baseline comfort level:   Encourage patient to monitor pain and request assistance   Assess pain using appropriate pain scale   Administer analgesics based on type and severity of pain and evaluate response   Implement non-pharmacological measures as appropriate and evaluate response

## 2025-01-14 NOTE — PROGRESS NOTES
Physical Medicine & Rehabilitation  Progress Note      Subjective:      59 year-old female with AIDP with BLE weakness and paresthesia/neuropathic pain. Patient is noting continued progress with therapies. She is using heating pad for cramping muscle pain now with some relief. She is noting poor sleep with difficulty falling asleep despite being on routine melatonin.     Evaluated with virtual  today    ROS:  Denies fevers, chills, sweats.  No chest pain, palpitations, lightheadedness.  Denies coughing, wheezing or shortness of breath.  Denies abdominal pain, nausea, diarrhea or constipation.  No new areas of joint pain.  Denies new areas of numbness or weakness.  Denies new anxiety or depression issues.  No new skin problems.    Rehabilitation:       PT:    Bed mobility  Bridging: Supervision  Rolling to Left: Independent  Rolling to Right: Independent  Supine to Sit: Independent  Sit to Supine: Independent  Scooting: Supervision  Bed Mobility Comments: Mat, wedge, 2 pillows.         Transfers  Sit to Stand: Independent  Stand to Sit: Independent  Bed to Chair: Stand by assistance (recliner> bed no device.)  Stand Pivot Transfers: Independent (c walker w/c>recliner)  Lateral Transfers: Stand by assistance  Comment: toilet transfer sit<>stand SBA, hygiene independent, donning/doffing pants independent.         Ambulation  Surface: Level tile, Ramp  Device: Rolling Walker  Other Apparatus: Wheelchair follow  Assistance: Stand by assistance  Quality of Gait: Narrow HORTENSIA, G reciprocal gait pattern, downward gaze, kyphotic posture.  Gait Deviations: Decreased step height, Decreased step length  Distance: 10 ft, 200 ft c 2 turns, 300 ft.  Comments: G response to cues for posture and wider HORTENSIA; no response to cues to adapt gaze upward.  More Ambulation?: Yes  Ambulation 2  Surface - 2: level tile  Device 2: Single point cane  Other Apparatus 2:  (w/c follow)  Assistance 2: Contact guard assistance  Quality of  \"PROTIME\", \"INR\" in the last 72 hours.  APTT: No results for input(s): \"APTT\" in the last 72 hours.  CARDIAC ENZYMES: No results for input(s): \"CKMB\", \"CKMBINDEX\", \"TROPONINT\" in the last 72 hours.    Invalid input(s): \"CKTOTAL;3\" troponins   FASTING LIPID PANEL:  Lab Results   Component Value Date    CHOL 148 12/26/2024    HDL 20 (L) 12/26/2024    TRIG 188 (H) 12/26/2024     LIVER PROFILE: No results for input(s): \"AST\", \"ALT\", \"BILIDIR\", \"BILITOT\", \"ALKPHOS\" in the last 72 hours.    Invalid input(s): \"ALB\"     Current Medications:   Current Facility-Administered Medications: amLODIPine (NORVASC) tablet 5 mg, 5 mg, Oral, BID  gabapentin (NEURONTIN) capsule 600 mg, 600 mg, Oral, TID  lidocaine 4 % external patch 1 patch, 1 patch, TransDERmal, Daily  acetaminophen (TYLENOL) tablet 650 mg, 650 mg, Oral, q8h  melatonin tablet 6 mg, 6 mg, Oral, Nightly  acetaminophen (TYLENOL) tablet 650 mg, 650 mg, Oral, Q4H PRN  polyethylene glycol (GLYCOLAX) packet 17 g, 17 g, Oral, Daily  senna (SENOKOT) tablet 17.2 mg, 2 tablet, Oral, Daily PRN  bisacodyl (DULCOLAX) suppository 10 mg, 10 mg, Rectal, Daily PRN  butalbital-APAP-caffeine -40 MG per capsule 1 capsule, 1 capsule, Oral, Q4H PRN  carboxymethylcellulose (REFRESH PLUS) 0.5 % ophthalmic solution 1 drop, 1 drop, Left Eye, 4x Daily  enoxaparin (LOVENOX) injection 40 mg, 40 mg, SubCUTAneous, Daily  lubrifresh P.M. (artificial tears) ophthalmic ointment, , Both Eyes, Q1H PRN  ondansetron (ZOFRAN-ODT) disintegrating tablet 4 mg, 4 mg, Oral, Q8H PRN **OR** ondansetron (ZOFRAN) injection 4 mg, 4 mg, IntraVENous, Q6H PRN  rosuvastatin (CRESTOR) tablet 40 mg, 40 mg, Oral, Nightly  tiZANidine (ZANAFLEX) tablet 4 mg, 4 mg, Oral, Q6H PRN      Impression/Plan:   Impaired ADLs, gait, and mobility due to:      AIDP with Bilateral lower extremity weakness/paresthesia, neuropathic pain, left facial droop:  PT/OT for gait, mobility, strengthening, endurance, ADLs, and self care.  Increased gabapentin to 3 times daily, start routine Tylenol 1/04.  Titrating gabapentin to effective dose - last increase to 600 mg TID 1/12 with improvement.   Cognitive impairment: SLP treating  Dysarthria: SLP treating  Mild oral stage dysphagia: SLP treating  Bilateral dry eyes: Continue ophthalmic solution and as needed artificial tears  Hypertension: Continue amlodipine - internal medicine increased to BID for persistent high SBP  Migraine headaches: pre-existing and worse with impaired sleep. Has prn Fioricet. May benefit from neurology follow up for preventive medication management.  Lymphadenopathy: S/p cervical lymph biopsy on 12/23/2024, outpatient follow-up with oncology.  Cholelithiasis: Continue to monitor, follow-up with GI outpatient  Back pain due multilevel spinal stenosis: Had Toradol in the hospital. Continue lidocaine patch, and as needed Zanaflex. Gabapentin as described above.   Insomnia: not responding to routine melatonin - will trial Trazodone starting 1/14.  Bowel Management: Miralax daily, senokot prn, dulcolax prn.  DVT Prophylaxis:  low molecular weight heparin, SCD at hs, SONDRA stockings during the day  Internal medicine for medical management  Follow ups: PCP 1-2 weeks, PM&R, Oncology, neurology (AIDP and migraines), GI      Electronically signed by RUBI JACOBO MD on 1/14/2025 at 9:46 AM      This note is created with the assistance of a speech recognition program.  While intending to generate a document that actually reflects the content of the visit, the document can still have some errors including those of syntax and sound a like substitutions which may escape proof reading.  In such instances, actual meaning can be extrapolated by contextual diversion.

## 2025-01-14 NOTE — PROGRESS NOTES
[facility]   Acute Rehabilitation Occupational Therapy Daily Treatment Note    Date: 25  Patient Name: Suzan Garcia       Room: 2641/2641-01  MRN: [id number]  Account: [id number]   : 1965  (59 y.o.) Gender: female       Diagnosis: Guillain Aguada Syndrome with paraparesis , cranial nerve VII palsy, Dysphagia  Additional Pertinent Hx: Suzan Garcia is a 59 y.o. right-handed female with history of HTN admitted to [facility] on 2024.       She initially presented with left facial droop and bilateral lower limb weakness for 1 day.  She had been recently admitted to [facility] with cholelithiasis and thoracic and abdominal lymphadenopathy.  Upon the current presentation, MRI brain showed no acute intracranial abnormality.  MRI of the spine showed no acute abnormalities.  Lumbar puncture was done on 24.  She is currently receiving a 5-day course of IVIG for Guillain-Aguada syndrome (last dose 25).     She reports ongoing bilateral lower limb weakness, right greater than left.  She also notes numbness/tingling in the bilateral lower limbs.  She states that she continues to have some weakness in the left side of her face along with speech difficulty.  She reports blurred vision as well.  per neuro MD 1-3: Bilateral, R>L, leg weakness and left CN VII palsy. In a patient with CSF albuminocytologic dissociation, consistent with Guillain-Barré syndrome.  Somewhat atypical given asymmetric findings, although similar cases have previously been reported in literature. GM AB panel negative. Symptoms improved with IVIG.    Treatment Diagnosis: Impaired self-care status due to GB with BLE weakness affecting mobility.    Past Medical History:  has a past medical history of Chronic back pain.    Past Surgical History:   has a past surgical history that includes  section and IR BIOPSY LYMPH NODE SUPERFICIAL  safety, and use of AE/modified techniques as needed  Long Term Goal 3: Pt will complete functional mobility/transfers with CATHERINE, Good safety, and least restrictive device  Long Term Goal 4: Pt will tolerate dynamic/static standing/ ambulating with least restrictive device and mod indep for12+ mins during therapeutic exercise/functional activity for improved activity tolerance  Long Term Goal 5: mod indep simple advanced adls with good balance and safety.  Additional Goals?: Yes  Long Term Goal 6: increase R  from 35 to 38 and L  from 28 to 32 to increase hand strength for adls.  Long Term Goal 7: Pt will improve 9 Hole Peg test time by 10 seconds bilaterally to indicate improve fine motor coordination for self-care activities. (Goal added by BIANCA Dickerson/L 1/12/25)    Plan  Occupational Therapy Plan  Times Per Week: 5-7  Times Per Day: Twice a day  Current Treatment Recommendations: Balance training, Functional mobility training, Endurance training, Safety education & training, Patient/Caregiver education & training, Equipment evaluation, education, & procurement, Home management training, Self-Care / ADL, Group Therapy    OT Individual Minutes     01/14/25 0927 01/14/25 0928   Time Code Minutes    Timed Code Treatment Minutes 65 Minutes 31 Minutes   OT Individual Minutes   Time In 0927 1417   Time Out 1032 1448   Minutes 65 31     Electronically signed by BIANCA Hogue/L on 1/14/25 at 3:27 PM EST

## 2025-01-14 NOTE — CARE COORDINATION
ARU CASE MANAGEMENT NOTE:    Admission Date:  1/3/2025 Suzan Garcia is a 59 y.o.  female    Admitted for : Paraparesis (HCC) [G82.20]    Expected Discharge Date: 01/16/25    This RN and LOUIS Malin CM spoke with patient regarding dc plan. Plan remains to dc home with . Patient agreeable to OP therapy at [facility], and states that daughter or  will help with transportation. No other concerns mentioned by patient at this time.       Outside appointments while in ARU: None    DME:TBD    Will continue to follow for additional discharge needs.    Electronically signed by Herminia Turner RN on 1/14/2025 at 3:42 PM    : Tavo   ID: [id number]  Session ID:[id number]  3:13 PM

## 2025-01-15 PROCEDURE — 6370000000 HC RX 637 (ALT 250 FOR IP): Performed by: PHYSICAL MEDICINE & REHABILITATION

## 2025-01-15 PROCEDURE — 6370000000 HC RX 637 (ALT 250 FOR IP): Performed by: GENERAL PRACTICE

## 2025-01-15 PROCEDURE — 99232 SBSQ HOSP IP/OBS MODERATE 35: CPT | Performed by: PHYSICAL MEDICINE & REHABILITATION

## 2025-01-15 PROCEDURE — 97116 GAIT TRAINING THERAPY: CPT

## 2025-01-15 PROCEDURE — 97530 THERAPEUTIC ACTIVITIES: CPT

## 2025-01-15 PROCEDURE — 97129 THER IVNTJ 1ST 15 MIN: CPT

## 2025-01-15 PROCEDURE — 6370000000 HC RX 637 (ALT 250 FOR IP): Performed by: STUDENT IN AN ORGANIZED HEALTH CARE EDUCATION/TRAINING PROGRAM

## 2025-01-15 PROCEDURE — 1180000000 HC REHAB R&B

## 2025-01-15 PROCEDURE — 6370000000 HC RX 637 (ALT 250 FOR IP)

## 2025-01-15 PROCEDURE — 99232 SBSQ HOSP IP/OBS MODERATE 35: CPT | Performed by: INTERNAL MEDICINE

## 2025-01-15 PROCEDURE — 6370000000 HC RX 637 (ALT 250 FOR IP): Performed by: INTERNAL MEDICINE

## 2025-01-15 PROCEDURE — 97110 THERAPEUTIC EXERCISES: CPT

## 2025-01-15 PROCEDURE — 97535 SELF CARE MNGMENT TRAINING: CPT

## 2025-01-15 PROCEDURE — 6360000002 HC RX W HCPCS

## 2025-01-15 PROCEDURE — 97130 THER IVNTJ EA ADDL 15 MIN: CPT

## 2025-01-15 RX ADMIN — GABAPENTIN 600 MG: 300 CAPSULE ORAL at 20:46

## 2025-01-15 RX ADMIN — ACETAMINOPHEN 650 MG: 325 TABLET ORAL at 05:07

## 2025-01-15 RX ADMIN — CARBOXYMETHYLCELLULOSE SODIUM 1 DROP: 5 SOLUTION/ DROPS OPHTHALMIC at 07:32

## 2025-01-15 RX ADMIN — AMLODIPINE BESYLATE 5 MG: 5 TABLET ORAL at 20:46

## 2025-01-15 RX ADMIN — CARBOXYMETHYLCELLULOSE SODIUM 1 DROP: 5 SOLUTION/ DROPS OPHTHALMIC at 02:00

## 2025-01-15 RX ADMIN — CARBOXYMETHYLCELLULOSE SODIUM 1 DROP: 5 SOLUTION/ DROPS OPHTHALMIC at 20:49

## 2025-01-15 RX ADMIN — BUTALBITA,ACETAMINOPHEN AND CAFFEINE 1 CAPSULE: 50; 300; 40 CAPSULE ORAL at 03:04

## 2025-01-15 RX ADMIN — ROSUVASTATIN 40 MG: 40 TABLET, FILM COATED ORAL at 20:46

## 2025-01-15 RX ADMIN — CARBOXYMETHYLCELLULOSE SODIUM 1 DROP: 5 SOLUTION/ DROPS OPHTHALMIC at 17:40

## 2025-01-15 RX ADMIN — MINERAL OIL, PETROLATUM: 425; 568 OINTMENT OPHTHALMIC at 07:31

## 2025-01-15 RX ADMIN — GABAPENTIN 600 MG: 300 CAPSULE ORAL at 14:51

## 2025-01-15 RX ADMIN — CARBOXYMETHYLCELLULOSE SODIUM 1 DROP: 5 SOLUTION/ DROPS OPHTHALMIC at 14:52

## 2025-01-15 RX ADMIN — LISINOPRIL 20 MG: 20 TABLET ORAL at 07:30

## 2025-01-15 RX ADMIN — AMITRIPTYLINE HYDROCHLORIDE 25 MG: 25 TABLET, FILM COATED ORAL at 20:49

## 2025-01-15 RX ADMIN — AMLODIPINE BESYLATE 5 MG: 5 TABLET ORAL at 07:30

## 2025-01-15 RX ADMIN — ACETAMINOPHEN 650 MG: 325 TABLET ORAL at 14:51

## 2025-01-15 RX ADMIN — ACETAMINOPHEN 650 MG: 325 TABLET ORAL at 20:45

## 2025-01-15 RX ADMIN — ENOXAPARIN SODIUM 40 MG: 100 INJECTION SUBCUTANEOUS at 07:29

## 2025-01-15 RX ADMIN — GABAPENTIN 600 MG: 300 CAPSULE ORAL at 07:29

## 2025-01-15 RX ADMIN — POLYETHYLENE GLYCOL 3350 17 G: 17 POWDER, FOR SOLUTION ORAL at 07:30

## 2025-01-15 RX ADMIN — BUTALBITA,ACETAMINOPHEN AND CAFFEINE 1 CAPSULE: 50; 300; 40 CAPSULE ORAL at 20:54

## 2025-01-15 RX ADMIN — TRAZODONE HYDROCHLORIDE 25 MG: 50 TABLET ORAL at 20:46

## 2025-01-15 ASSESSMENT — PAIN SCALES - GENERAL
PAINLEVEL_OUTOF10: 8
PAINLEVEL_OUTOF10: 6
PAINLEVEL_OUTOF10: 0
PAINLEVEL_OUTOF10: 7
PAINLEVEL_OUTOF10: 8

## 2025-01-15 ASSESSMENT — PAIN DESCRIPTION - LOCATION
LOCATION: HEAD
LOCATION: BACK;OTHER (COMMENT)
LOCATION: HEAD

## 2025-01-15 ASSESSMENT — PAIN DESCRIPTION - ORIENTATION
ORIENTATION: ANTERIOR
ORIENTATION: ANTERIOR;POSTERIOR

## 2025-01-15 ASSESSMENT — PAIN - FUNCTIONAL ASSESSMENT
PAIN_FUNCTIONAL_ASSESSMENT: PREVENTS OR INTERFERES SOME ACTIVE ACTIVITIES AND ADLS
PAIN_FUNCTIONAL_ASSESSMENT: PREVENTS OR INTERFERES SOME ACTIVE ACTIVITIES AND ADLS
PAIN_FUNCTIONAL_ASSESSMENT: ACTIVITIES ARE NOT PREVENTED

## 2025-01-15 ASSESSMENT — PAIN SCALES - WONG BAKER: WONGBAKER_NUMERICALRESPONSE: NO HURT

## 2025-01-15 ASSESSMENT — 9 HOLE PEG TEST
TESTTIME_SECONDS: 35
TESTTIME_SECONDS: 35

## 2025-01-15 ASSESSMENT — PAIN DESCRIPTION - DESCRIPTORS
DESCRIPTORS: ACHING
DESCRIPTORS: ACHING
DESCRIPTORS: THROBBING

## 2025-01-15 NOTE — PROGRESS NOTES
Physical Medicine & Rehabilitation  Progress Note      Subjective:      59 year-old female with AIDP with BLE weakness and paresthesia/neuropathic pain. Patient is c/o headache again overnight which was described as pressure and pain was severe. She is progressing with therapies and achieving goals for home discharge.    Evaluated with virtual  today    ROS:  Denies fevers, chills, sweats.  No chest pain, palpitations, lightheadedness.  Denies coughing, wheezing or shortness of breath.  Denies abdominal pain, nausea, diarrhea or constipation.  No new areas of joint pain.  Denies new areas of numbness or weakness.  Denies new anxiety or depression issues.  No new skin problems.    Rehabilitation:       PT:    Bed mobility  Bridging: Supervision  Rolling to Left: Independent  Rolling to Right: Independent  Supine to Sit: Independent  Sit to Supine: Modified independent  Scooting: Modified independent  Bed Mobility Comments: Bed flat, 2 pillows.         Transfers  Sit to Stand: Independent  Stand to Sit: Independent  Bed to Chair: Stand by assistance (recliner> bed no device.)  Stand Pivot Transfers: Independent (c walker w/c>recliner)  Lateral Transfers: Stand by assistance  Car Transfer: Supervision (Pt self-assisting LEs into passenger side of car simulator, seat height ~30\". Spouse & daughter present for family training, present for instruction to sit on seat first in lieu of lifting single LE into car prior to sitting; G return demonstration.)  Comment: RW used for most tranfers, unless noted otherwise.         Ambulation  Surface: Level tile, Ramp  Device: Rolling Walker  Other Apparatus: Wheelchair follow  Assistance: Stand by assistance (Progressing to Supervision)  Quality of Gait: Narrow HORTENSIA, G reciprocal gait pattern, downward gaze, kyphotic posture.  Gait Deviations: Decreased step height, Decreased step length  Distance: 15' x2 (level); 115' level + 180' ramp; 329' (level) AM. 30' (level)  results for input(s): \"WBC\", \"RBC\", \"HGB\", \"HCT\", \"MCV\", \"RDW\", \"PLT\" in the last 72 hours.    BMP:   No results for input(s): \"NA\", \"K\", \"CL\", \"CO2\", \"PHOS\", \"BUN\", \"CREATININE\", \"GLUCOSE\" in the last 72 hours.    Invalid input(s): \"CA\"    BNP: No results for input(s): \"BNP\" in the last 72 hours.  PT/INR: No results for input(s): \"PROTIME\", \"INR\" in the last 72 hours.  APTT: No results for input(s): \"APTT\" in the last 72 hours.  CARDIAC ENZYMES: No results for input(s): \"CKMB\", \"CKMBINDEX\", \"TROPONINT\" in the last 72 hours.    Invalid input(s): \"CKTOTAL;3\" troponins   FASTING LIPID PANEL:  Lab Results   Component Value Date    CHOL 148 12/26/2024    HDL 20 (L) 12/26/2024    TRIG 188 (H) 12/26/2024     LIVER PROFILE: No results for input(s): \"AST\", \"ALT\", \"BILIDIR\", \"BILITOT\", \"ALKPHOS\" in the last 72 hours.    Invalid input(s): \"ALB\"     Current Medications:   Current Facility-Administered Medications: lisinopril (PRINIVIL;ZESTRIL) tablet 20 mg, 20 mg, Oral, Daily  traZODone (DESYREL) tablet 25 mg, 25 mg, Oral, Nightly  amLODIPine (NORVASC) tablet 5 mg, 5 mg, Oral, BID  gabapentin (NEURONTIN) capsule 600 mg, 600 mg, Oral, TID  lidocaine 4 % external patch 1 patch, 1 patch, TransDERmal, Daily  acetaminophen (TYLENOL) tablet 650 mg, 650 mg, Oral, q8h  acetaminophen (TYLENOL) tablet 650 mg, 650 mg, Oral, Q4H PRN  polyethylene glycol (GLYCOLAX) packet 17 g, 17 g, Oral, Daily  senna (SENOKOT) tablet 17.2 mg, 2 tablet, Oral, Daily PRN  bisacodyl (DULCOLAX) suppository 10 mg, 10 mg, Rectal, Daily PRN  butalbital-APAP-caffeine -40 MG per capsule 1 capsule, 1 capsule, Oral, Q4H PRN  carboxymethylcellulose (REFRESH PLUS) 0.5 % ophthalmic solution 1 drop, 1 drop, Left Eye, 4x Daily  enoxaparin (LOVENOX) injection 40 mg, 40 mg, SubCUTAneous, Daily  lubrifresh P.M. (artificial tears) ophthalmic ointment, , Both Eyes, Q1H PRN  ondansetron (ZOFRAN-ODT) disintegrating tablet 4 mg, 4 mg, Oral, Q8H PRN **OR** ondansetron

## 2025-01-15 NOTE — CARE COORDINATION
[facility] Acute Inpatient Rehabilitation  Case Management Discharge Note    Discharge Disposition: Home with husand    Discharge Transportation Method: family vehicle  ,  Time: TBD    IMM Letter Status: Not Applicable: Patient does not have Medicare as a payor.    Home Healthcare Services: Not Applicable    Outpatient Therapy Services:  [facility]    DME: No DME Needs Identified    Current reconciled medication list sent to subsequent provider via: Electronic Health Record      Maria F Freyer, RN  Acute Inpatient Rehabilitation Case Management Department  Ph:[phone] Fax: [phone]    Smart Phrase Template Revision: 12/19/2024

## 2025-01-15 NOTE — PROGRESS NOTES
[facility]   IN-PATIENT SERVICE   [facility]    Consult note            Date:   1/15/2025  Patient name:  Suzan Garcia  Date of admission:  1/3/2025  7:28 PM  MRN:   [id number]  Account:  [id number]  YOB: 1965  PCP:    Enriqueta Borges MD  Room:   [address] [place] [address]  Code Status:    Full Code    Chief Complaint:     No chief complaint on file.      History Obtained From:     patient    History of Present Illness:     The patient is a 59 y.o.   /  female who presents with No chief complaint on file.   and she is admitted to the hospital for the management of    Patient is 59-year-old female who initially came to the hospital with left-sided facial droop and bilateral lower extremity weakness, seen by neurology, head CT CTA was negative,MRI brain showed no acute intracranial abnormalities, except for some microvascular changes.MRI cervical spine showed mild spinal canal stenosis at C6-C7 and T1-T2. MRI thoracic spine showed mild spinal stenosis at T8-T9 secondary to posterior disc protrusion. MRI lumbar spine showed central disc protrusion at L4-L5, moderately narrows the spinal canal. Posterior disc bulge at L3-L4 and mild spinal canal stenosis. Mild bilateral neural foraminal narrowing at L3-L4 and L4-L5.  LP consistent with albumin cytologic dissociation, seen by neurology, started on IVIG, completed 5  Currently admitted at [facility] for further   was used during encounter    Past Medical History:     Past Medical History:   Diagnosis Date    Chronic back pain         Past Surgical History:     Past Surgical History:   Procedure Laterality Date     SECTION      IR BIOPSY LYMPH NODE SUPERFICIAL  2024    IR BIOPSY LYMPH NODE SUPERFICIAL 2024 [facility] SPECIAL PROCEDURES        Medications Prior to Admission:     Prior to Admission medications    Medication Sig Start Date End Date Taking? Authorizing Provider   gabapentin  drinking, increase in urination, hot or cold intolerance  MUSCULOSKELETAL:  negative joint pains, muscle aches, swelling of joints  NEUROLOGICAL: Refer weakness  BEHAVIOR/PSYCH:  negative for depression, anxiety    Physical Exam:   BP (!) 148/88   Pulse 80   Temp 98.1 °F (36.7 °C) (Oral)   Resp 18   Ht 1.54 m (5' 0.63\")   Wt 59.8 kg (131 lb 12.8 oz)   LMP 10/13/2015 (Approximate) Comment: possible menopause  SpO2 98%   BMI 25.21 kg/m²   Temp (24hrs), Av.9 °F (36.6 °C), Min:97.7 °F (36.5 °C), Max:98.1 °F (36.7 °C)    No results for input(s): \"POCGLU\" in the last 72 hours.    No intake or output data in the 24 hours ending 01/15/25 1507      General Appearance:  alert, well appearing, and in no acute distress  Mental status: oriented to person, place, and time with normal affect  Head:  normocephalic, atraumatic.  Eye: no icterus, redness, pupils equal and reactive, extraocular eye movements intact, conjunctiva clear  Ear: normal external ear, no discharge, hearing intact  Nose:  no drainage noted  Mouth: mucous membranes moist  Neck: supple, no carotid bruits, thyroid not palpable  Lungs: Bilateral equal air entry, clear to ausculation, no wheezing, rales or rhonchi, normal effort  Cardiovascular: normal rate, regular rhythm, no murmur, gallop, rub.  Abdomen: Soft, nontender, nondistended, normal bowel sounds, no hepatomegaly or splenomegaly  Neurologic: Strength right lower extremity 2/5, right upper extremity 5/5, left upper extremity 4/5 skin: No gross lesions, rashes, bruising or bleeding on exposed skin area  Extremities:  peripheral pulses palpable, no pedal edema or calf pain with palpation      Investigations:      Laboratory Testing:  No results found for this or any previous visit (from the past 24 hour(s)).        Imaging/Diagnostics:        Assessment :      Primary Problem  Paraparesis (HCC)    Active Hospital Problems    Diagnosis Date Noted    Paraparesis (HCC) [G82.20] 2025    AIDP

## 2025-01-15 NOTE — PROGRESS NOTES
Physical Therapy  [facility]   Physical Therapy Treatment  Date: 1/15/25  Patient Name: Suzan Garcia       Room: 2641/2641-01  MRN: [id number]  Account: [id number]   : 1965  (59 y.o.) Gender: female     Discharge Recommendations:  Discharge Recommendations: Patient would benefit from continued therapy after discharge, Therapy recommended at discharge, Home with assist PRN     General  Patient assessed for rehabilitation services?: Yes  Response To Previous Treatment: Patient with no complaints from previous session.  Family/Caregiver Present: Yes (spouse Freedom in AM)    Past Medical History:  has a past medical history of Chronic back pain.  Past Surgical History:   has a past surgical history that includes  section and IR BIOPSY LYMPH NODE SUPERFICIAL (2024).    Restrictions  Restrictions/Precautions  Restrictions/Precautions: Fall Risk, General Precautions  Activity Level: Up as Tolerated, Up with Assist  Required Braces or Orthoses?: No     Subjective  Subjective  Subjective: Pt states she slept fair, got medication for headache midway through night, then slept soundly till morning.   General  General Comments:  froze at start of AM session; multiple log-ins required.     Pain  Pre-Pain: 3  Pain Interventions: Rest, Heat     Objective  Transfers  Transfers  Sit to Stand: Independent  Stand to Sit: Independent  Bed to Chair: Supervision (recliner> bed no device.)  Stand Pivot Transfers: Modified independent  Comment: RW used for most tranfers, unless noted otherwise.     Mobility  Ambulation  Surface: Level tile  Device: Rolling Walker  Other Apparatus:  (Writer pulling w/c behind to return it to room.)  Assistance: Modified Independent  Quality of Gait: Narrow HORTENSIA, G reciprocal gait pattern, downward gaze, kyphotic posture.  Gait Deviations: Decreased step height, Decreased step length  Distance: 329' AM  Ambulation 2  Surface - 2: level tile  Device

## 2025-01-15 NOTE — PLAN OF CARE
Problem: Discharge Planning  Goal: Discharge to home or other facility with appropriate resources  1/15/2025 1313 by Ilda Hodge LPN  Outcome: Progressing     Problem: Safety - Adult  Goal: Free from fall injury  1/15/2025 1313 by Ilda Hodge LPN  Outcome: Progressing     Problem: Skin/Tissue Integrity  Goal: Absence of new skin breakdown  Description: 1.  Monitor for areas of redness and/or skin breakdown  2.  Assess vascular access sites hourly  3.  Every 4-6 hours minimum:  Change oxygen saturation probe site  4.  Every 4-6 hours:  If on nasal continuous positive airway pressure, respiratory therapy assess nares and determine need for appliance change or resting period.  1/15/2025 1313 by Ilda Hodge LPN  Outcome: Progressing     Problem: Pain  Goal: Verbalizes/displays adequate comfort level or baseline comfort level  Outcome: Progressing     Problem: ABCDS Injury Assessment  Goal: Absence of physical injury  Outcome: Progressing

## 2025-01-15 NOTE — PROGRESS NOTES
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 right-handed female with history of HTN admitted to [facility] on 2024.       She initially presented with left facial droop and bilateral lower limb weakness for 1 day.  She had been recently admitted to [place] with cholelithiasis and thoracic and abdominal lymphadenopathy.  Upon the current presentation, MRI brain showed no acute intracranial abnormality.  MRI of the spine showed no acute abnormalities.  Lumbar puncture was done on 24.  She is currently receiving a 5-day course of IVIG for Guillain-Cedarcreek syndrome (last dose 25).     She reports ongoing bilateral lower limb weakness, right greater than left.  She also notes numbness/tingling in the bilateral lower limbs.  She states that she continues to have some weakness in the left side of her face along with speech difficulty.  She reports blurred vision as well.  per neuro MD 1-3: Bilateral, R>L, leg weakness and left CN VII palsy. In a patient with CSF albuminocytologic dissociation, consistent with Guillain-Barré syndrome.  Somewhat atypical given asymmetric findings, although similar cases have previously been reported in literature. GM AB panel negative. Symptoms improved with IVIG.    Treatment Diagnosis: Impaired self-care status due to GB with BLE weakness affecting mobility.    Past Medical History:  has a past medical history of Chronic back pain.    Past Surgical History:   has a past surgical history that includes  section and IR BIOPSY LYMPH NODE SUPERFICIAL  Tolerance: Patient tolerated treatment well    Patient Education  Education  Education Given To: Patient  Education Provided: Role of Therapy;Plan of Care;ADL Function;Home Exercise Program;Transfer Training;Mobility Training  Education Provided Comments: d/c planning, B UE HEP  Education Method: Verbal;Demonstration  Barriers to Learning: None  Education Outcome: Verbalized understanding;Demonstrated understanding    OT Equipment Recommendations  Other: grab bars for shower and toilet    Safety Devices  Safety Devices in place: Yes  Type of devices: All fall risk precautions in place;Left in chair;Chair alarm in place;Call light within reach;Gait belt       Family education  Completed 1/15    Goals  Patient Goals   Patient goals : \"go home,  do things on my own, not depend on anybody.\"  Short Term Goals  Time Frame for Short Term Goals: 1 week  Short Term Goal 1: Pt will complete LB ADLs with supervision, Good safety, and use of AE/modified techniques as needed  Short Term Goal 2: Pt will complete functional transfers with SBA, Good safety, and least restrictive device  Short Term Goal 3: Pt will tolerate dynamic/static standing/ ambulating with least restrictive device and SBA for 7+ mins during therapeutic exercise/functional activity for improved activity tolerance  Short Term Goal 4: Pt will verbalize/demonstrate Good understanding of fall prevention/home safety modification techniques for improved safety during self-care  Short Term Goal 5: Pt will actively participate in 30+ mins of therapeutic exercise/functional activity for improved safety and independence with self-care  Additional Goals?: Yes  Short Term Goal 6: SBA amb to obtain set up for adls with good walker safety technique.  Short Term Goal 7: SBA with advanced adls with good walker safety technique.    Long Term Goals  Time Frame for Long Term Goals : upon dc  Long Term Goal 1: mod indep amb to obtain set up for adls with good safe techniques

## 2025-01-15 NOTE — PROGRESS NOTES
Physical Therapy  [facility]   Acute Rehabilitation Physical Therapy Treatment    Date: 1/15/25  Patient Name: Suzan Garcia       Room: 2641/2641-01  MRN: [id number]  Account: [id number]   : 1965  (59 y.o.) Gender: female     Response To Previous Treatment: Patient with no complaints from previous session.  Family/Caregiver Present: Yes (spouse Freedom in AM)    Treatment Diagnosis: Paraperesis    Past Medical History:  has a past medical history of Chronic back pain.    Past Surgical History:   has a past surgical history that includes  section and IR BIOPSY LYMPH NODE SUPERFICIAL (2024).    Restrictions  Restrictions/Precautions  Restrictions/Precautions: Fall Risk, General Precautions  Activity Level: Up as Tolerated, Up with Assist  Required Braces or Orthoses?: No    Subjective  Subjective  Subjective: Pt states she slept fair, got medication for headache midway through night, then slept soundly till morning.  General  General Comments:  froze at start of AM session; multiple log-ins required.  Pain  Pre-Pain: 3  Pain Interventions: Rest;Heat     Objective  Bed Mobility   Bed mobility  Bridging: Independent  Rolling to Left: Independent  Rolling to Right: Independent  Supine to Sit: Independent  Sit to Supine: Independent  Scooting: Independent  Bed Mobility Comments: Bed flat, 2 pillows.    Transfers   Transfers  Sit to Stand: Independent  Stand to Sit: Independent  Bed to Chair: Supervision (recliner> bed no device.)  Stand Pivot Transfers: Modified independent  Comment: RW used for most tranfers, unless noted otherwise.     Mobility   Ambulation  Surface: Level tile  Device: Rolling Walker  Other Apparatus:  (Writer pulling w/c behind to return it to room.)  Assistance: Modified Independent  Quality of Gait: Narrow HORTENSIA, G reciprocal gait pattern, downward gaze, kyphotic posture.  Gait Deviations: Decreased step height;Decreased step

## 2025-01-15 NOTE — PROGRESS NOTES
Speech Language Pathology  [facility] ACUTE REHAB    Cognitive Treatment Note    Date: 1/15/2025  Patient’s Name: Suzan Garcia  MRN: [id number]  Diagnosis:   Patient Active Problem List   Diagnosis Code    ASCUS with positive high risk HPV cervical R87.610, R87.810    Screening mammogram for breast cancer Z12.31    Sleep disturbance G47.9    Cholelithiasis without cholangitis K80.20    Biliary colic K80.50    Cervical lymphadenopathy R59.0    Lymphadenopathy, generalized R59.1    Thrombocytopenia ([facility]) D69.6    Leg weakness, bilateral R29.898    Bell's palsy G51.0    AIDP (acute inflammatory demyelinating polyneuropathy) ([facility]) G61.0    Paraparesis ([facility]) G82.20       Pain Rating: None reported    Cognitive Treatment    Treatment time:       SLP Individual Minutes  Time In: 09001  Time Out: 0931  Minutes: 30       Subjective: [x] Alert [x] Cooperative     [] Confused     [] Agitated    [] Lethargic      Objective/Assessment:    Recall: Reverse order (3 words): 1/3 increased to 2/3 with several repetitions of stimuli.  Reverse order (2 words): 5/5 independently.  Pt. With difficulty comprehending instructions for task. Several repetitions of task instructions provided.     Speech: ST instructed pt. To complete facial exercises targeting labial strength and ROM. Pt. Completed exercises x3 sets x10 reps each. Labial symmetry noted, pt. Continuing to report L sided weakness. Exercises discontinued as pt. Began reporting bilateral facial pain.    Pt. Reports she has been completing exercises independently t/o the day.  ST reinforcing completion of exercises.     Other:  via language services used t/o session. Call light left within reach at end of session.     Plan:  [x] Continue ST services    [] Discharge from ST:      Discharge recommendations:  [] Further therapy recommended at discharge.   [] No therapy recommended at discharge.      Treatment completed by: Sulma Kumar M.S. CF-SLP

## 2025-01-15 NOTE — CARE COORDINATION
ARU CASE MANAGEMENT NOTE:    Admission Date:  1/3/2025 Suzan Garcia is a 59 y.o.  female    Admitted for : Paraparesis (HCC) [G82.20]    Rounding on patient, she was in the bathroom with OT. No changes in discharge goal, plan is to discharge home with spouse. Patient to attend OP therapy in [facility]    Several attempts made to find this patient a Maltese speaking PCP. Was unsuccessful. Was able to establish her with a PCP in [facility] which uses a tablet with an . Asked for the tablet to be available for each appointment      Outside appointments while in ARU: none     DME: none    Will continue to follow for additional discharge needs.    Electronically signed by Maria F Freyer, RN on 1/15/2025 at 1:46 PM

## 2025-01-16 VITALS
SYSTOLIC BLOOD PRESSURE: 142 MMHG | WEIGHT: 131.8 LBS | HEART RATE: 77 BPM | OXYGEN SATURATION: 95 % | BODY MASS INDEX: 24.88 KG/M2 | RESPIRATION RATE: 16 BRPM | HEIGHT: 61 IN | TEMPERATURE: 97.6 F | DIASTOLIC BLOOD PRESSURE: 82 MMHG

## 2025-01-16 PROCEDURE — 6360000002 HC RX W HCPCS

## 2025-01-16 PROCEDURE — 6370000000 HC RX 637 (ALT 250 FOR IP): Performed by: GENERAL PRACTICE

## 2025-01-16 PROCEDURE — 6370000000 HC RX 637 (ALT 250 FOR IP): Performed by: INTERNAL MEDICINE

## 2025-01-16 PROCEDURE — 97129 THER IVNTJ 1ST 15 MIN: CPT

## 2025-01-16 PROCEDURE — 6370000000 HC RX 637 (ALT 250 FOR IP): Performed by: PHYSICAL MEDICINE & REHABILITATION

## 2025-01-16 PROCEDURE — 92507 TX SP LANG VOICE COMM INDIV: CPT

## 2025-01-16 PROCEDURE — 97535 SELF CARE MNGMENT TRAINING: CPT

## 2025-01-16 PROCEDURE — 99238 HOSP IP/OBS DSCHRG MGMT 30/<: CPT | Performed by: PHYSICAL MEDICINE & REHABILITATION

## 2025-01-16 RX ORDER — ONDANSETRON 4 MG/1
4 TABLET, ORALLY DISINTEGRATING ORAL EVERY 8 HOURS PRN
Qty: 21 TABLET | Refills: 0 | Status: SHIPPED | OUTPATIENT
Start: 2025-01-16

## 2025-01-16 RX ORDER — ROSUVASTATIN CALCIUM 40 MG/1
40 TABLET, COATED ORAL NIGHTLY
Qty: 30 TABLET | Refills: 3 | Status: SHIPPED | OUTPATIENT
Start: 2025-01-16

## 2025-01-16 RX ORDER — LIDOCAINE 4 G/G
1 PATCH TOPICAL DAILY
COMMUNITY
Start: 2025-01-16

## 2025-01-16 RX ORDER — MINERAL OIL AND WHITE PETROLATUM 150; 830 MG/G; MG/G
OINTMENT OPHTHALMIC
COMMUNITY
Start: 2025-01-16

## 2025-01-16 RX ORDER — AMLODIPINE BESYLATE 5 MG/1
5 TABLET ORAL 2 TIMES DAILY
Qty: 30 TABLET | Refills: 3 | Status: SHIPPED | OUTPATIENT
Start: 2025-01-16

## 2025-01-16 RX ORDER — CARBOXYMETHYLCELLULOSE SODIUM 5 MG/ML
1 SOLUTION/ DROPS OPHTHALMIC 4 TIMES DAILY
Qty: 15 ML | Refills: 0 | Status: SHIPPED | OUTPATIENT
Start: 2025-01-16 | End: 2025-01-20 | Stop reason: SDUPTHER

## 2025-01-16 RX ORDER — GABAPENTIN 300 MG/1
600 CAPSULE ORAL 3 TIMES DAILY
Qty: 180 CAPSULE | Refills: 2 | Status: SHIPPED | OUTPATIENT
Start: 2025-01-16 | End: 2025-04-16

## 2025-01-16 RX ORDER — LISINOPRIL 20 MG/1
20 TABLET ORAL DAILY
Qty: 30 TABLET | Refills: 3 | Status: SHIPPED | OUTPATIENT
Start: 2025-01-16

## 2025-01-16 RX ORDER — POLYETHYLENE GLYCOL 3350 17 G/17G
17 POWDER, FOR SOLUTION ORAL DAILY PRN
COMMUNITY
Start: 2025-01-16 | End: 2025-02-15

## 2025-01-16 RX ORDER — BUTALBITAL, ACETAMINOPHEN AND CAFFEINE 300; 40; 50 MG/1; MG/1; MG/1
1 CAPSULE ORAL EVERY 6 HOURS PRN
Qty: 28 CAPSULE | Refills: 0 | Status: SHIPPED | OUTPATIENT
Start: 2025-01-16 | End: 2025-01-23

## 2025-01-16 RX ORDER — TRAZODONE HYDROCHLORIDE 50 MG/1
25 TABLET, FILM COATED ORAL NIGHTLY
Qty: 15 TABLET | Refills: 0 | Status: SHIPPED | OUTPATIENT
Start: 2025-01-16 | End: 2025-01-20 | Stop reason: SDUPTHER

## 2025-01-16 RX ADMIN — ENOXAPARIN SODIUM 40 MG: 100 INJECTION SUBCUTANEOUS at 08:41

## 2025-01-16 RX ADMIN — GABAPENTIN 600 MG: 300 CAPSULE ORAL at 08:41

## 2025-01-16 RX ADMIN — AMLODIPINE BESYLATE 5 MG: 5 TABLET ORAL at 08:41

## 2025-01-16 RX ADMIN — CARBOXYMETHYLCELLULOSE SODIUM 1 DROP: 5 SOLUTION/ DROPS OPHTHALMIC at 08:43

## 2025-01-16 RX ADMIN — ACETAMINOPHEN 650 MG: 325 TABLET ORAL at 05:32

## 2025-01-16 RX ADMIN — LISINOPRIL 20 MG: 20 TABLET ORAL at 08:41

## 2025-01-16 ASSESSMENT — PAIN SCALES - GENERAL
PAINLEVEL_OUTOF10: 4
PAINLEVEL_OUTOF10: 0
PAINLEVEL_OUTOF10: 3
PAINLEVEL_OUTOF10: 0

## 2025-01-16 ASSESSMENT — PAIN DESCRIPTION - ORIENTATION: ORIENTATION: ANTERIOR

## 2025-01-16 ASSESSMENT — PAIN DESCRIPTION - DESCRIPTORS: DESCRIPTORS: ACHING

## 2025-01-16 ASSESSMENT — PAIN - FUNCTIONAL ASSESSMENT: PAIN_FUNCTIONAL_ASSESSMENT: PREVENTS OR INTERFERES SOME ACTIVE ACTIVITIES AND ADLS

## 2025-01-16 ASSESSMENT — PAIN DESCRIPTION - LOCATION: LOCATION: HEAD

## 2025-01-16 NOTE — DISCHARGE SUMMARY
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         Transfers:   Transfers  Sit to Stand: Independent  Stand to Sit: Independent  Bed to Chair: Supervision (recliner> bed no device.)  Stand Pivot Transfers: Modified independent  Lateral Transfers: Stand by assistance  Car Transfer: Supervision (Pt self-assisting LEs into passenger side of car simulator, seat height ~30\". Spouse & daughter present for family training, present for instruction to sit on seat first in lieu of lifting single LE into car prior to sitting; G return demonstration.)  Comment: RW used for most tranfers, unless noted otherwise.         Mobility:   Ambulation  Surface: Level tile  Device: Rolling Walker  Other Apparatus:  (Writer pulling w/c behind to return it to room.)  Assistance: Modified Independent  Quality of Gait: Narrow HORTENSIA, G reciprocal gait pattern, downward gaze, kyphotic posture.  Gait Deviations: Decreased step height, Decreased step length  Distance: 329' AM  Comments: G response to postural cue. Family present for family training, which included discussion of Pt's previous falls, Pt expressing feeling of progress to point where she understands how she

## 2025-01-16 NOTE — PROGRESS NOTES
[facility]   Acute Rehabilitation Occupational Therapy Daily Treatment Note    Date: 25  Patient Name: Suzan Garcia       Room: 2641/2641-01  MRN: [id number]  Account: [id number]   : 1965  (59 y.o.) Gender: female          Diagnosis: Guillain [place] Syndrome with paraparesis , cranial nerve VII palsy, Dysphagia  Additional Pertinent Hx: Suzan Garcia is a 59 y.o. right-handed female with history of HTN admitted to [facility] on 2024.       She initially presented with left facial droop and bilateral lower limb weakness for 1 day.  She had been recently admitted to [place] with cholelithiasis and thoracic and abdominal lymphadenopathy.  Upon the current presentation, MRI brain showed no acute intracranial abnormality.  MRI of the spine showed no acute abnormalities.  Lumbar puncture was done on 24.  She is currently receiving a 5-day course of IVIG for Guillain-Gulliver syndrome (last dose 25).     She reports ongoing bilateral lower limb weakness, right greater than left.  She also notes numbness/tingling in the bilateral lower limbs.  She states that she continues to have some weakness in the left side of her face along with speech difficulty.  She reports blurred vision as well.  per neuro MD 1-3: Bilateral, R>L, leg weakness and left CN VII palsy. In a patient with CSF albuminocytologic dissociation, consistent with Guillain-Barré syndrome.  Somewhat atypical given asymmetric findings, although similar cases have previously been reported in literature. GM AB panel negative. Symptoms improved with IVIG.    Treatment Diagnosis: Impaired self-care status due to GB with BLE weakness affecting mobility.    Past Medical History:  has a past medical history of Chronic back pain.    Past Surgical History:   has a past surgical history that includes  section and IR BIOPSY LYMPH NODE SUPERFICIAL  clean shirt at modified ind level for extended time while seated on a chair in her bathroom.    Lower Extremity Dressing  Assistance Level: Modified independent  Skilled Clinical Factors: pt completed donning and doffing her underwear and pants at modified ind level while seated in chair to thread legs into holes and then good balance while standing to pull up pants and underwear with no LOB. pt completed item retrieval from her closet at modified ind level.    Putting On/Taking Off Footwear  Assistance Level: Modified independent  Skilled Clinical Factors: patient able to don/doff slippers and socks with use of figure 4 method w/out difficulty. also able to zulma her shoes.    Toileting  Assistance Level: Modified independent  Skilled Clinical Factors: patient able to complete hygiene while seated with good safety for clothing mangement while standing with RW    Toilet Transfers  Assistance Level: Modified independent  Skilled Clinical Factors: good safety noted with use of grab bars          OT scores     Eating  Assistance Needed: Independent  CARE Score: 6  Discharge Goal: Independent  Oral Hygiene  Assistance Needed: Independent  CARE Score: 6  Discharge Goal: Independent  Toileting Hygiene  Assistance needed: Independent  CARE Score: 6  Discharge Goal: Independent  Shower/Bathe Self  Assistance Needed: Independent  CARE Score: 6  Discharge Goal: Independent  Upper Body Dressing  Assistance Needed: Independent  CARE Score: 6  Discharge Goal: Independent  Lower Body Dressing  Assistance Needed: Independent  CARE Score: 6  Discharge Goal: Independent  Putting On/Taking Off Footwear  Assistance Needed: Independent  CARE Score: 6  Discharge Goal: Independent  Toilet Transfer  Assistance needed: Independent  CARE Score: 6  Discharge Goal: Independent                                Mobility                            Sit to Stand  Assistance Level: Modified independent  Skilled Clinical Factors: good safety

## 2025-01-16 NOTE — PROGRESS NOTES
ACUTE INPATIENT REHABILITATION DISCHARGE  [facility]    Patient Name: Suzan Garcia  MRN: [id number]     Patient discharged in stable condition as per order of attending physician.     AVS provided by nurse at time of discharge, which includes all necessary medical information pertaining to the patients current course of illness, treatment, medications, post-discharge goals of care, and treatment preferences.     Provision of Current Reconciled Medication List to Patient at Discharge   Indicate the route(s) of transmission of the current reconciled medication list to the patient/family/caregiver. Paper Based (e.g. fax, copies, print outs)     Availability of \"My Chart\" offered to patient as a tool for updated health record.  Steps for activation discussed with patient as mentioned on AVS.      Patient/responsible party verbalize understanding of discharge plan and are in agreement with goal/plan/treatment preferences.     Belongings including  Clothing, Cell Phone, , Shoes  sent with patient/responsible party.      Home medications sent home with patient/responsible party N/A    Car Transfer   Level of assistance required for patient transfer into vehicle:   SUPERVISION OR TOUCHING ASSISTANCE: [place] provides verbal cues and/or touching/steadying and/or contact guard assistance as patient completes activity. Assistance may be provided throughout the activity or intermittently.     High-Risk Drug Classes: Use and Indication   Check if the patient is taking any medications by pharmacological classification     No High Risk Drug Class Medications Ordered     *If no appropriate indication for use noted, follow up with provider for order clarification     Pain Assessment   Over the past 5 days, how much of the time has pain made it hard for you to sleep at night?   Rarely or not at all   Over the past 5 days, how often have you limited your participation in rehabilitation therapy sessions due to

## 2025-01-16 NOTE — PLAN OF CARE
Problem: Discharge Planning  Goal: Discharge to home or other facility with appropriate resources  1/16/2025 0938 by Kat Jeffrey LPN  Outcome: Progressing     Problem: Safety - Adult  Goal: Free from fall injury  1/16/2025 0938 by Kat Jeffrey LPN  Outcome: Progressing     Problem: Skin/Tissue Integrity  Goal: Absence of new skin breakdown  Description: 1.  Monitor for areas of redness and/or skin breakdown  2.  Assess vascular access sites hourly  3.  Every 4-6 hours minimum:  Change oxygen saturation probe site  4.  Every 4-6 hours:  If on nasal continuous positive airway pressure, respiratory therapy assess nares and determine need for appliance change or resting period.  1/16/2025 0938 by Kat Jeffrey LPN  Outcome: Progressing     Problem: Pain  Goal: Verbalizes/displays adequate comfort level or baseline comfort level  1/16/2025 0938 by Kat Jeffrey LPN  Outcome: Progressing     Problem: ABCDS Injury Assessment  Goal: Absence of physical injury  1/16/2025 0938 by Kat Jeffrey LPN  Outcome: Progressing

## 2025-01-16 NOTE — PROGRESS NOTES
Speech Language Pathology  [facility] ACUTE REHAB    Cognitive Treatment Note    Date: 1/16/2025  Patient’s Name: Suzan Garcia  MRN: [id number]  Diagnosis:   Patient Active Problem List   Diagnosis Code    ASCUS with positive high risk HPV cervical R87.610, R87.810    Screening mammogram for breast cancer Z12.31    Sleep disturbance G47.9    Cholelithiasis without cholangitis K80.20    Biliary colic K80.50    Cervical lymphadenopathy R59.0    Lymphadenopathy, generalized R59.1    Thrombocytopenia ([facility]) D69.6    Leg weakness, bilateral R29.898    Bell's palsy G51.0    AIDP (acute inflammatory demyelinating polyneuropathy) ([facility]) G61.0    Paraparesis ([facility]) G82.20       Pain Rating: None reported    Cognitive Treatment    Treatment time:       SLP Individual Minutes  Time In: 0900  Time Out: 0930  Minutes: 30       Subjective: [x] Alert [x] Cooperative     [] Confused     [] Agitated    [] Lethargic      Objective/Assessment:    Recall: Short-term recall of 3 unrelated units: 5-minute delay: 1/3 increased to 3/3 with min verbal cues. Additional 3-minute delay: 3/3 independently.     Problem Solving/Reasoning: State category (concrete): 6/8 increased to 8/8 with min-mod verbal cues.  Add to category (concrete): 8/8 independently.     Speech: Pt. Instructed to demo completion of facial exercises targeting labial strength and ROM to ST. Pt. Completed exercises x5 sets x10 reps each. Pt. Reports she has been completing exercises independently t/o the day.  ST reinforcing completion of exercises.    Other: Pt. To d/c from ARU this date. No follow up therapy recommended upon d/c as pt. Is able to complete facial exercises independently, no dysarthria noted, and cognition appears to be at baseline.  via language services used t/o session. Call light left within reach at end of session.     Plan:  [x] Continue ST services    [] Discharge from ST:      Discharge recommendations:  [] Further therapy recommended at  discharge.   [x] No therapy recommended at discharge.      Treatment completed by: Sulma Kumar M.S. CF-SLP

## 2025-01-16 NOTE — DISCHARGE INSTR - COC
Continuity of Care Form    Patient Name: Suzan Cook   :  1965  MRN:  [id number]    Admit date:  1/3/2025  Discharge date:  ***    Code Status Order: Full Code   Advance Directives:   Advance Care Flowsheet Documentation             Admitting Physician:  Jose M Montoya MD  PCP: Enriqueta Borges MD    Discharging Nurse: Kat Jeffrey LPN  Discharging Hospital Unit/Room#: 2641/2641-01  Discharging Unit Phone Number: [phone]    Emergency Contact:   Extended Emergency Contact Information  Primary Emergency Contact: Freedom Brown  Address: [address] [place] [address]  Home Phone: [phone]  Relation: Spouse  Secondary Emergency Contact: keaton cook  Home Phone: [phone]  Mobile Phone: [phone]  Relation: Child    Past Surgical History:  Past Surgical History:   Procedure Laterality Date     SECTION      IR BIOPSY LYMPH NODE SUPERFICIAL  2024    IR BIOPSY LYMPH NODE SUPERFICIAL 2024 [facility] SPECIAL PROCEDURES       Immunization History:   Immunization History   Administered Date(s) Administered    COVID-19, MODERNA, (age 12y+), IM, 50mcg/0.5mL 10/28/2023       Active Problems:  Patient Active Problem List   Diagnosis Code    ASCUS with positive high risk HPV cervical R87.610, R87.810    Screening mammogram for breast cancer Z12.31    Sleep disturbance G47.9    Cholelithiasis without cholangitis K80.20    Biliary colic K80.50    Cervical lymphadenopathy R59.0    Lymphadenopathy, generalized R59.1    Thrombocytopenia (HCC) D69.6    Leg weakness, bilateral R29.898    Bell's palsy G51.0    AIDP (acute inflammatory demyelinating polyneuropathy) (HCC) G61.0    Paraparesis (HCC) G82.20       Isolation/Infection:   Isolation            No Isolation          Patient Infection Status       None to display            Nurse Assessment:  Last Vital Signs: BP (!) 142/82   Pulse 77   Temp 97.6 °F (36.4 °C) (Oral)   Resp 16   Ht 1.54 m (5' 0.63\")   Wt 59.8 kg (131

## 2025-01-20 ENCOUNTER — OFFICE VISIT (OUTPATIENT)
Dept: FAMILY MEDICINE CLINIC | Age: 60
End: 2025-01-20

## 2025-01-20 VITALS
HEART RATE: 69 BPM | BODY MASS INDEX: 25.71 KG/M2 | SYSTOLIC BLOOD PRESSURE: 128 MMHG | OXYGEN SATURATION: 99 % | DIASTOLIC BLOOD PRESSURE: 78 MMHG | WEIGHT: 136.2 LBS | HEIGHT: 61 IN

## 2025-01-20 DIAGNOSIS — G51.0 FACIAL PALSY: ICD-10-CM

## 2025-01-20 DIAGNOSIS — G82.20 PARAPARESIS (HCC): ICD-10-CM

## 2025-01-20 DIAGNOSIS — Z09 HOSPITAL DISCHARGE FOLLOW-UP: ICD-10-CM

## 2025-01-20 DIAGNOSIS — F43.0 ACUTE STRESS DISORDER: ICD-10-CM

## 2025-01-20 DIAGNOSIS — L30.8 HERPES ZOSTER DERMATITIS: ICD-10-CM

## 2025-01-20 DIAGNOSIS — G61.0 AIDP (ACUTE INFLAMMATORY DEMYELINATING POLYNEUROPATHY) (HCC): Primary | ICD-10-CM

## 2025-01-20 DIAGNOSIS — B02.8 HERPES ZOSTER DERMATITIS: ICD-10-CM

## 2025-01-20 RX ORDER — VALACYCLOVIR HYDROCHLORIDE 1 G/1
1000 TABLET, FILM COATED ORAL 3 TIMES DAILY
Qty: 21 TABLET | Refills: 0 | Status: CANCELLED | OUTPATIENT
Start: 2025-01-20 | End: 2025-01-27

## 2025-01-20 RX ORDER — FAMCICLOVIR 500 MG/1
500 TABLET ORAL 3 TIMES DAILY
Qty: 21 TABLET | Refills: 0 | Status: SHIPPED | OUTPATIENT
Start: 2025-01-20 | End: 2025-01-27

## 2025-01-20 RX ORDER — CARBOXYMETHYLCELLULOSE SODIUM 5 MG/ML
1 SOLUTION/ DROPS OPHTHALMIC 4 TIMES DAILY
Qty: 15 ML | Refills: 0 | Status: SHIPPED | OUTPATIENT
Start: 2025-01-20

## 2025-01-20 RX ORDER — TRAZODONE HYDROCHLORIDE 50 MG/1
25 TABLET, FILM COATED ORAL NIGHTLY
Qty: 15 TABLET | Refills: 0 | Status: SHIPPED | OUTPATIENT
Start: 2025-01-20

## 2025-01-20 ASSESSMENT — ENCOUNTER SYMPTOMS
SHORTNESS OF BREATH: 0
CONSTIPATION: 0
ABDOMINAL PAIN: 0
CHEST TIGHTNESS: 0
DIARRHEA: 0

## 2025-01-20 ASSESSMENT — PATIENT HEALTH QUESTIONNAIRE - PHQ9
SUM OF ALL RESPONSES TO PHQ QUESTIONS 1-9: 15
3. TROUBLE FALLING OR STAYING ASLEEP: NEARLY EVERY DAY
SUM OF ALL RESPONSES TO PHQ9 QUESTIONS 1 & 2: 6
2. FEELING DOWN, DEPRESSED OR HOPELESS: NEARLY EVERY DAY
SUM OF ALL RESPONSES TO PHQ QUESTIONS 1-9: 15
10. IF YOU CHECKED OFF ANY PROBLEMS, HOW DIFFICULT HAVE THESE PROBLEMS MADE IT FOR YOU TO DO YOUR WORK, TAKE CARE OF THINGS AT HOME, OR GET ALONG WITH OTHER PEOPLE: VERY DIFFICULT
1. LITTLE INTEREST OR PLEASURE IN DOING THINGS: NEARLY EVERY DAY
SUM OF ALL RESPONSES TO PHQ QUESTIONS 1-9: 15
4. FEELING TIRED OR HAVING LITTLE ENERGY: NEARLY EVERY DAY
7. TROUBLE CONCENTRATING ON THINGS, SUCH AS READING THE NEWSPAPER OR WATCHING TELEVISION: NOT AT ALL
5. POOR APPETITE OR OVEREATING: NEARLY EVERY DAY
9. THOUGHTS THAT YOU WOULD BE BETTER OFF DEAD, OR OF HURTING YOURSELF: NOT AT ALL
SUM OF ALL RESPONSES TO PHQ QUESTIONS 1-9: 15
8. MOVING OR SPEAKING SO SLOWLY THAT OTHER PEOPLE COULD HAVE NOTICED. OR THE OPPOSITE, BEING SO FIGETY OR RESTLESS THAT YOU HAVE BEEN MOVING AROUND A LOT MORE THAN USUAL: NOT AT ALL
6. FEELING BAD ABOUT YOURSELF - OR THAT YOU ARE A FAILURE OR HAVE LET YOURSELF OR YOUR FAMILY DOWN: NOT AT ALL

## 2025-01-20 NOTE — PROGRESS NOTES
Attending Physician Statement  I  have discussed the care of Suzan Garcia including pertinent history and exam findings with the resident. I agree with the assessment, plan and orders as documented by the resident.      /78 (Site: Right Upper Arm, Position: Sitting, Cuff Size: Medium Adult)   Pulse 69   Ht 1.54 m (5' 0.63\")   Wt 61.8 kg (136 lb 3.2 oz)   LMP 10/13/2015 (Approximate) Comment: possible menopause  SpO2 99%   BMI 26.05 kg/m²    BP Readings from Last 3 Encounters:   01/20/25 128/78   01/16/25 (!) 142/82   01/03/25 133/71     Wt Readings from Last 3 Encounters:   01/20/25 61.8 kg (136 lb 3.2 oz)   01/12/25 59.8 kg (131 lb 12.8 oz)   12/31/24 54.5 kg (120 lb 2.4 oz)          Diagnosis Orders   1. AIDP (acute inflammatory demyelinating polyneuropathy) ([facility])  [facility] Order for Walker as OP    [facility], Neurology, [facility]    traZODone (DESYREL) 50 MG tablet      2. Hospital discharge follow-up  TX DISCHARGE MEDS RECONCILED W/ CURRENT OUTPATIENT MED LIST      3. Paraparesis ([facility])  [facility] Order for Walker as OP    [facility], Neurology, [facility]    traZODone (DESYREL) 50 MG tablet      4. Facial palsy  carboxymethylcellulose (REFRESH PLUS) 0.5 % SOLN ophthalmic solution      5. Herpes zoster dermatitis  famciclovir (FAMVIR) 500 MG tablet      6. Acute stress disorder                Harish Lopez DO 1/21/2025 8:10 AM      
Visit Information    Have you changed or started any medications since your last visit including any over-the-counter medicines, vitamins, or herbal medicines? no   Have you stopped taking any of your medications? Is so, why? -  no  Are you having any side effects from any of your medications? - no    Have you seen any other physician or provider since your last visit?  no   Have you had any other diagnostic tests since your last visit?  yes - Labs, EMG, Echo, EKG   Have you been seen in the emergency room and/or had an admission in a hospital since we last saw you?  yes - [facility]   Have you had your routine dental cleaning in the past 6 months?  no     Do you have an active MyChart account? If no, what is the barrier?  Yes    Patient Care Team:  Enriqueta Borges MD as PCP - General (Family Medicine)  Hubert Doll MD as Consulting Physician (Obstetrics & Gynecology)    Medical History Review  Past Medical, Family, and Social History reviewed and does contribute to the patient presenting condition    Health Maintenance   Topic Date Due    HIV screen  Never done    Hepatitis C screen  Never done    Hepatitis B vaccine (1 of 3 - 19+ 3-dose series) Never done    DTaP/Tdap/Td vaccine (1 - Tdap) Never done    Colorectal Cancer Screen  Never done    Shingles vaccine (1 of 2) Never done    Depression Screen  03/27/2024    Flu vaccine (1) 08/01/2024    Breast cancer screen  04/24/2025    A1C test (Diabetic or Prediabetic)  12/26/2025    Lipids  12/26/2025    Cervical cancer screen  03/27/2028    COVID-19 Vaccine  Completed    Hepatitis A vaccine  Aged Out    Hib vaccine  Aged Out    Polio vaccine  Aged Out    Meningococcal (ACWY) vaccine  Aged Out    Pneumococcal 0-64 years Vaccine  Aged Out             
abnormal muscle tone.      Gait: Gait abnormal.      Deep Tendon Reflexes: Reflexes normal.      Reflex Scores:       Patellar reflexes are 2+ on the right side and 2+ on the left side.     Comments: Dryness of the left eye   Psychiatric:         Thought Content: Thought content normal.         Judgment: Judgment normal.           Assessment and Plan:    1. Hospital discharge follow-up  -Discharge from hospital to acute rehab for GBS    2. AIDP (acute inflammatory demyelinating polyneuropathy) ([facility])  -Slowly improving, will need to continue physical therapy  -Remains to use a walker and would need another walker with seat  -Patient would need to follow-up with neurology closely  - [facility] Physical [facility] Order for Walker as OP  - [facility], Neurology, [facility]  - traZODone (DESYREL) 50 MG tablet; Take 0.5 tablets by mouth nightly  Dispense: 15 tablet; Refill: 0    3. Paraparesis ([facility])  -S/p acute inflammatory demyelinating polyneuropathy  - [facility] Order for Walker as OP  -F2F order for rollator on the day was evaluated in the clinic  Patient needs needs with walker due to tiring easily and need to sit down multiple times while walking  - [facility], Neurology, [facility]  - traZODone (DESYREL) 50 MG tablet; Take 0.5 tablets by mouth nightly  Dispense: 15 tablet; Refill: 0    4. Facial palsy  -S/p acute inflammatory demyelinating polyneuropathy  -Significantly improved, remains to have left dry eye  - carboxymethylcellulose (REFRESH PLUS) 0.5 % SOLN ophthalmic solution; Place 1 drop into the left eye 4 times daily  Dispense: 15 mL; Refill: 0    5. Herpes zoster dermatitis  -Patient has burning sensation and itchy sensation at the lower right thoracic dermatome of the back  -Patient is considered immune suppressed with concerns of reactivation of varicella zoster infection  -Long discussion with patient and son regarding

## 2025-01-20 NOTE — PATIENT INSTRUCTIONS
Thank you for letting us take care of you today. We hope all your questions were addressed. If a question was overlooked or something else comes to mind after you return home, please contact a member of your Care Team listed below.      Your Care Team at [facility] is Team #1  Anaya Rdz M.D. (Faculty)  Enriqueta Borges M.D. (Resident)  Ketty Renteria D.O. (Resident)  Jeffery Muhammad M.D. (Resident)  Nichelle Munguia M.D. (Resident)  Elidia Herrera, [facility]  Orestes Arnold, [facility]  Anjana Gonzalez, [facility]  Buffy Harvey, [facility]  Maddy Chin, [facility]  Leslie Beltran, [facility]  Romain Malone (LJ) Olive,   Kristin Aleman [facility] (Clinical Pharmacist)     Office phone number: [phone]    If you need to get in right away due to illness, please be advised we have \"Same Day\" appointments available Monday-Friday. Please call us at [phone] option #3 to schedule your \"Same Day\" appointment.

## 2025-01-23 NOTE — TELEPHONE ENCOUNTER
Patient's daughter called stating the when the patient was seen in the office on 01/20/2025 she was prescribed a medication and patient is not feeling any different, patient states she is having the pain mainly in her back but all over her body. Patient also requesting refills that are pending.Last visit: 01/20/2025  Last Med refill: 01/16/2025  Does patient have enough medication for 72 hours: no    Next Visit Date:  Future Appointments   Date Time Provider Department Center   2/7/2025  4:00 PM Enriqueta Borges MD [facility] DEP   3/19/2025  2:30 PM Rita Marquez MD [facility]   4/2/2025  2:30 PM Jasson Dacosta MD Neuro [facility] Neurology -       Health Maintenance   Topic Date Due    HIV screen  Never done    Hepatitis C screen  Never done    Hepatitis B vaccine (1 of 3 - 19+ 3-dose series) Never done    DTaP/Tdap/Td vaccine (1 - Tdap) Never done    Colorectal Cancer Screen  Never done    Shingles vaccine (1 of 2) Never done    Flu vaccine (1) 08/01/2024    Breast cancer screen  04/24/2025    A1C test (Diabetic or Prediabetic)  12/26/2025    Lipids  12/26/2025    Depression Monitoring  01/20/2026    Cervical cancer screen  03/27/2028    COVID-19 Vaccine  Completed    Hepatitis A vaccine  Aged Out    Hib vaccine  Aged Out    Polio vaccine  Aged Out    Meningococcal (ACWY) vaccine  Aged Out    Pneumococcal 0-64 years Vaccine  Aged Out    Depression Screen  Discontinued       Hemoglobin A1C (%)   Date Value   12/26/2024 5.7   01/24/2019 5.9             ( goal A1C is < 7)   No components found for: \"LABMICR\"  No components found for: \"LDLCHOLESTEROL\", \"LDLCALC\"    (goal LDL is <100)   AST (U/L)   Date Value   01/04/2025 41 (H)     ALT (U/L)   Date Value   01/04/2025 47 (H)     BUN (mg/dL)   Date Value   01/11/2025 12     BP Readings from Last 3 Encounters:   01/20/25 128/78   01/16/25 (!) 142/82   01/03/25 133/71          (goal 120/80)    All Future Testing planned in [facility]  Lab Frequency

## 2025-01-29 ENCOUNTER — TELEPHONE (OUTPATIENT)
Dept: FAMILY MEDICINE CLINIC | Age: 60
End: 2025-01-29

## 2025-01-29 NOTE — TELEPHONE ENCOUNTER
[facility] called stating the office notes that were sent to them did not clarify why the patient need the Rolator walker .  She stated she faxed over a paper stating what needed to be included in the office notes.    I have put a paper in your box if you need to use it .  Please advise and thank you

## 2025-02-07 ENCOUNTER — OFFICE VISIT (OUTPATIENT)
Dept: FAMILY MEDICINE CLINIC | Age: 60
End: 2025-02-07

## 2025-02-07 VITALS
DIASTOLIC BLOOD PRESSURE: 57 MMHG | BODY MASS INDEX: 26.66 KG/M2 | WEIGHT: 141.2 LBS | SYSTOLIC BLOOD PRESSURE: 95 MMHG | HEART RATE: 51 BPM | HEIGHT: 61 IN

## 2025-02-07 DIAGNOSIS — R09.82 POST-NASAL DRIP: ICD-10-CM

## 2025-02-07 DIAGNOSIS — I10 HYPERTENSION, UNSPECIFIED TYPE: ICD-10-CM

## 2025-02-07 DIAGNOSIS — G61.0 AIDP (ACUTE INFLAMMATORY DEMYELINATING POLYNEUROPATHY) (HCC): ICD-10-CM

## 2025-02-07 DIAGNOSIS — G82.20 PARAPARESIS (HCC): Primary | ICD-10-CM

## 2025-02-07 RX ORDER — GABAPENTIN 300 MG/1
600 CAPSULE ORAL 2 TIMES DAILY
Qty: 120 CAPSULE | Refills: 0 | Status: SHIPPED | OUTPATIENT
Start: 2025-02-07 | End: 2025-05-08

## 2025-02-07 RX ORDER — FLUTICASONE PROPIONATE 50 MCG
1 SPRAY, SUSPENSION (ML) NASAL DAILY
Qty: 32 EACH | Refills: 0 | Status: SHIPPED | OUTPATIENT
Start: 2025-02-07 | End: 2025-02-12

## 2025-02-07 RX ORDER — LISINOPRIL 20 MG/1
10 TABLET ORAL DAILY
Qty: 30 TABLET | Refills: 3 | Status: SHIPPED | OUTPATIENT
Start: 2025-02-07

## 2025-02-07 ASSESSMENT — ENCOUNTER SYMPTOMS
CHEST TIGHTNESS: 0
BACK PAIN: 1
SHORTNESS OF BREATH: 0
DIARRHEA: 0
ABDOMINAL PAIN: 0
NAUSEA: 0

## 2025-02-07 NOTE — PROGRESS NOTES
Attending Physician Statement  I  have discussed the care of Suzan Garcia including pertinent history and exam findings with the resident. I agree with the assessment, plan and orders as documented by the resident.      BP (!) 95/57 (Site: Left Upper Arm, Position: Sitting, Cuff Size: Medium Adult)   Pulse 51   Ht 1.54 m (5' 0.63\")   Wt 64 kg (141 lb 3.2 oz)   LMP 10/13/2015 (Approximate) Comment: possible menopause  BMI 27.01 kg/m²    BP Readings from Last 3 Encounters:   02/07/25 (!) 95/57   01/20/25 128/78   01/16/25 (!) 142/82     Wt Readings from Last 3 Encounters:   02/07/25 64 kg (141 lb 3.2 oz)   01/20/25 61.8 kg (136 lb 3.2 oz)   01/12/25 59.8 kg (131 lb 12.8 oz)          Diagnosis Orders   1. Paraparesis (HCC)  tiZANidine (ZANAFLEX) 4 MG tablet    gabapentin (NEURONTIN) 300 MG capsule      2. AIDP (acute inflammatory demyelinating polyneuropathy) (HCC)  tiZANidine (ZANAFLEX) 4 MG tablet    gabapentin (NEURONTIN) 300 MG capsule      3. Hypertension, unspecified type  lisinopril (PRINIVIL;ZESTRIL) 20 MG tablet      4. Post-nasal drip  fluticasone (FLONASE) 50 MCG/ACT nasal spray              Moe Bianchi MD 2/7/2025 2:51 PM      
Visit Information    Have you changed or started any medications since your last visit including any over-the-counter medicines, vitamins, or herbal medicines? no   Have you stopped taking any of your medications? Is so, why? -  no  Are you having any side effects from any of your medications? - no    Have you seen any other physician or provider since your last visit?  no   Have you had any other diagnostic tests since your last visit?  no   Have you been seen in the emergency room and/or had an admission in a hospital since we last saw you?  no   Have you had your routine dental cleaning in the past 6 months?  no     Do you have an active MyChart account? If no, what is the barrier?  Yes    Patient Care Team:  Enriqueta Borges MD as PCP - General (Family Medicine)  Hubert Doll MD as Consulting Physician (Obstetrics & Gynecology)    Medical History Review  Past Medical, Family, and Social History reviewed and does not contribute to the patient presenting condition    Health Maintenance   Topic Date Due    HIV screen  Never done    Hepatitis C screen  Never done    Hepatitis B vaccine (1 of 3 - 19+ 3-dose series) Never done    DTaP/Tdap/Td vaccine (1 - Tdap) Never done    Colorectal Cancer Screen  Never done    Shingles vaccine (1 of 2) Never done    Pneumococcal 50+ years Vaccine (1 of 1 - PCV) Never done    Flu vaccine (1) 08/01/2024    COVID-19 Vaccine (2 - 2024-25 season) 09/01/2024    Breast cancer screen  04/24/2025    A1C test (Diabetic or Prediabetic)  12/26/2025    Lipids  12/26/2025    Depression Monitoring  01/20/2026    Cervical cancer screen  03/27/2028    Hepatitis A vaccine  Aged Out    Hib vaccine  Aged Out    Polio vaccine  Aged Out    Meningococcal (ACWY) vaccine  Aged Out    Depression Screen  Discontinued             
Motor: Weakness (Right sided weakness improving from last visit) present.      Coordination: Coordination normal.      Gait: Gait abnormal.   Psychiatric:         Mood and Affect: Mood normal.         Behavior: Behavior normal.           Assessment and Plan:    1. Paraparesis (HCC)  -Right sided s/p AIDP  -improved, continues with exercises and physical therapy  -Will start taper down gabapentin per patient request  - tiZANidine (ZANAFLEX) 4 MG tablet; Take 1 tablet by mouth every 8 hours as needed (muscle spasm)  Dispense: 30 tablet; Refill: 0  - gabapentin (NEURONTIN) 300 MG capsule; Take 2 capsules by mouth in the morning and at bedtime for 90 days.  Dispense: 120 capsule; Refill: 0    2. AIDP (acute inflammatory demyelinating polyneuropathy) (HCC)  - tiZANidine (ZANAFLEX) 4 MG tablet; Take 1 tablet by mouth every 8 hours as needed (muscle spasm)  Dispense: 30 tablet; Refill: 0  - gabapentin (NEURONTIN) 300 MG capsule; Take 2 capsules by mouth in the morning and at bedtime for 90 days.  Dispense: 120 capsule; Refill: 0    3. Hypertension, unspecified type  -Discontinue amlodipine  -Lisinopril switched to 10 mg  -Symptoms of orthostatic hypotension, discussed with patient in depth  - lisinopril (PRINIVIL;ZESTRIL) 20 MG tablet; Take 0.5 tablets by mouth daily  Dispense: 30 tablet; Refill: 3    4. Post-nasal drip  - fluticasone (FLONASE) 50 MCG/ACT nasal spray; 1 spray by Each Nostril route daily for 5 days  Dispense: 32 each; Refill: 0      Face-2-Face for seated walker:   Patient was evaluated today and a DME order was entered for a rollator because he/she requires this to successfully complete daily living tasks of ambulating. A wheeled walker with seat is necessary due to the patient's unsteady gait, lower and upper body weakness, needing to take multiple breaks while walking for long distance and inability to  an ambulation device; and he/she can ambulate only by pushing a walker instead of a lesser

## 2025-02-07 NOTE — PATIENT INSTRUCTIONS
Thank you for letting us take care of you today. We hope all your questions were addressed. If a question was overlooked or something else comes to mind after you return home, please contact a member of your Care Team listed below.      Your Care Team at [facility] is Team #1  Anaya Rdz M.D. (Faculty)  Enriqueta Borges M.D. (Resident)  Ketty Renteria D.O. (Resident)  Jeffery Muhammad M.D. (Resident)  Nichelle Munguia M.D. (Resident)  Elidia Herrera, [facility]  Orestes Arnold, [facility]  Anjana Gonzalez, [facility]  Buffy Harvey, [facility]  Maddy Chin, [facility]  Leslie Beltran, [facility]  Romain Malone (LJ) Olive,   Kristin Aleman [facility] (Clinical Pharmacist)     Office phone number: [phone]    If you need to get in right away due to illness, please be advised we have \"Same Day\" appointments available Monday-Friday. Please call us at [phone] option #3 to schedule your \"Same Day\" appointment.

## 2025-02-11 ENCOUNTER — TELEPHONE (OUTPATIENT)
Dept: FAMILY MEDICINE CLINIC | Age: 60
End: 2025-02-11

## 2025-02-11 ENCOUNTER — HOSPITAL ENCOUNTER (OUTPATIENT)
Dept: PHYSICAL THERAPY | Age: 60
Setting detail: THERAPIES SERIES
Discharge: HOME | End: 2025-02-11
Payer: COMMERCIAL

## 2025-02-11 PROCEDURE — 97162 PT EVAL MOD COMPLEX 30 MIN: CPT

## 2025-02-11 NOTE — CONSULTS
Biofeedback, additional 15 minutes   69354 [] Dry Needling, 3 or more muscles  20561       [x]  Medication allergies reviewed for use of    Dexamethasone Sodium Phosphate 4mg/ml     with iontophoresis treatments.   Pt is not allergic.      Frequency: 2 x/weeks for 12 visits    Today’s Treatment:  Modalities:   Exercises:  Exercise Reps/ Time Weight/ Level Comments                                 Other:    Specific Instructions for next treatment: LE strengthening, (hip and functional), dynamic gait, foot clearance and step length. Possible NMES for numbness in B lower legs/ feet.     Treatment Charges: Mins Units Time in/out   [x] Evaluation       []  Low       [x]  Moderate       []  High 50 1    []  Modalities      []  Ther Exercise      []  Manual Therapy      []  Ther Activities      []  Aquatics      []  Neuromuscular      []  Gait Training      []  Dry Needling           1-2 muscles      []  Dry Needling           3 or more muscles      [] Vasocompression      []  Other        TOTAL BILLABLE TIME: 50'    Time in:0808      Time out: 0858    Electronically signed by: Abraham Herrera PT        Physician Signature:________________________________Date:__________________  By signing above or cosigning this note, I have reviewed this plan of care and certify a need for medically necessary rehabilitation services.     *PLEASE SIGN ABOVE AND FAX BACK ALL PAGES*

## 2025-02-11 NOTE — TELEPHONE ENCOUNTER
Pharmacy called and stated that they was trying to understand prescribe for gabapentin 300 mg 3 times a day to 2 in the am and 2 in the pm. They also said it say for 90 days supply with only 120 pills. And they want to make sure this right

## 2025-02-13 DIAGNOSIS — G82.20 PARAPARESIS (HCC): ICD-10-CM

## 2025-02-13 DIAGNOSIS — G61.0 AIDP (ACUTE INFLAMMATORY DEMYELINATING POLYNEUROPATHY) (HCC): Primary | ICD-10-CM

## 2025-02-13 NOTE — PROGRESS NOTES
Patient was seen in the office on 02/07/2025  During that visit patient qualified for rollator  Documentation as required was completed    Face-2-Face for seated walker:   Patient was evaluated today and a DME order was entered for a rollator because he/she requires this to successfully complete daily living tasks of ambulating. A wheeled walker with seat is necessary due to the patient's unsteady gait, lower and upper body weakness, needing to take multiple breaks while walking for long distance and inability to  an ambulation device; and he/she can ambulate only by pushing a walker instead of a lesser assistive device such as a cane, crutch, or standard walker.  There is a high risk of fall for patient due to significant gait instability and orthostatic hypotension .  Patient cannot safely use the walker , using a walker with significantly help patient to improve with her physical activity.   the need for this equipment was discussed with the patient and he/she understands and is in agreement.       Enriqueta Borges MD  Family medicine resident, PGY3  2/13/2025 at 3:32 PM

## 2025-02-14 ENCOUNTER — HOSPITAL ENCOUNTER (OUTPATIENT)
Dept: PHYSICAL THERAPY | Age: 60
Setting detail: THERAPIES SERIES
Discharge: HOME | End: 2025-02-14
Payer: COMMERCIAL

## 2025-02-14 PROCEDURE — 97116 GAIT TRAINING THERAPY: CPT

## 2025-02-14 NOTE — FLOWSHEET NOTE
[facility]   Outpatient Rehabilitation & Therapy  [address]  P: [phone]   F: [phone]    Physical Therapy Daily Treatment Note      Date:  2025  Patient Name:  Suzan Garcia    :  1965  MRN: [id number]  Physician: Rita Marquez MD                             Insurance: [facility] shared services, 12 visits + eval  Medical Diagnosis:   G61.0 (ICD-10-CM) - AIDP (acute inflammatory demyelinating polyneuropathy) ([facility])   G82.20 (ICD-10-CM) - Paraparesis ([facility])   Rehab Codes: R53.1, R26.2   Onset date: 24               Next 's appt.: 3/19/25  Visit Count:                                 Cancel/No Show: 0/0    Marco A # [id number]    Subjective:  Patient arriving to PT with FWW.  The RLE from the knee to the foot is numb in the LLE the last 3 toes are numb.  Pain:  [] Yes  [x] No Location:  Pain Rating: (0-10 scale) denies/10  Pain altered Tx:  [] No  [] Yes  Action:  Comments:    Objective:  Modalities:   Precautions:  Exercises:  Exercise Reps/ Time Weight/ Level Completed  Today Comments   Standing       Slow marching 20x2  x No UE support; emphasis on slow   Letitia step overs 10x  x No UE support; emphasis on slow                 Seated       LAQ 10x2 3\", 2# x Added during rest d/t fatigue.          Gait       With  ft  x           Other:        Specific Instructions for next treatment: LE strengthening, (hip and functional), dynamic gait, foot clearance and step length. Possible NMES for numbness in B lower legs/ feet.       Assessment: [] Progressing toward goals.    [] No change.     [x] Other:  Initial session post eval-  Added 2lb ankle weights for the session to improve BLE proprioception with all activity.  Frequent cueing needed to promote slow controlled movement during exercises.  During amb, cue provided to encourage heel toe gait.  During standing exercises, patient motioned at R hip reporting fatigue.  Added seated LAQ for continued

## 2025-02-19 ENCOUNTER — HOSPITAL ENCOUNTER (OUTPATIENT)
Dept: PHYSICAL THERAPY | Age: 60
Setting detail: THERAPIES SERIES
Discharge: HOME | End: 2025-02-19
Payer: COMMERCIAL

## 2025-02-19 PROCEDURE — 97112 NEUROMUSCULAR REEDUCATION: CPT

## 2025-02-19 PROCEDURE — 97110 THERAPEUTIC EXERCISES: CPT

## 2025-02-19 NOTE — FLOWSHEET NOTE
[facility]   Outpatient Rehabilitation & Therapy  [address]  P: [phone]   F: [phone]    Physical Therapy Daily Treatment Note      Date:  2025  Patient Name:  Suzan Garcia    :  1965  MRN: [id number]  Physician: Rita Marquez MD                             Insurance: [facility] shared services, 12 visits + eval  Medical Diagnosis:   G61.0 (ICD-10-CM) - AIDP (acute inflammatory demyelinating polyneuropathy) ([facility])   G82.20 (ICD-10-CM) - Paraparesis ([facility])   Rehab Codes: R53.1, R26.2   Onset date: 24               Next 's appt.: 3/19/25  Visit Count: 3/12                                Cancel/No Show: 0/0    [place] #[id number]  Laly #[id number]    Subjective: Patient reporting to therapy with FWW stating she feels overall better.  Patient reporting her back feels better, her legs feel better and they don't feel as numb.  Patient reporting she feels like her shoulders are not as tense and the sensation is returning in her legs and shoulders.  Patient reporting she has not been using an AD to walk around her home.  Patient reporting she tried to reach for a pot that was in a lower cabinet and states she is afraid to bend her knees in fear of falling forward.    Pain:  [] Yes  [x] No Location:  Pain Rating: (0-10 scale) denies/10  Pain altered Tx:  [] No  [] Yes  Action:  Comments:    Objective:  Modalities:   Precautions:  Exercises: Wearing 2# ankle weights throughout session.  SBA-CGA throughout session  Exercise Reps/ Time Weight/ Level Completed  Today Comments   Standing       Slow marching 20x2 2# x No UE support; emphasis on slow   Letitia step overs 10x 2#  No UE support; emphasis on slow   Heel toe raises 10x 2# x  added- cues to keep knees extended   Tandem balance 2x 30\" 2# x  added   Mini squat 10x 2# x  added   Seated       LAQ 10x2 3\", 2# x Added during rest d/t fatigue.   added to HEP only 1 set          Gait       With  ft

## 2025-02-21 ENCOUNTER — APPOINTMENT (OUTPATIENT)
Dept: PHYSICAL THERAPY | Age: 60
End: 2025-02-21
Payer: COMMERCIAL

## 2025-02-26 ENCOUNTER — HOSPITAL ENCOUNTER (OUTPATIENT)
Dept: PHYSICAL THERAPY | Age: 60
Setting detail: THERAPIES SERIES
Discharge: HOME | End: 2025-02-26
Payer: COMMERCIAL

## 2025-02-26 PROCEDURE — 97110 THERAPEUTIC EXERCISES: CPT

## 2025-02-26 NOTE — FLOWSHEET NOTE
-   [facility]   Outpatient Rehabilitation & Therapy  [address]  P: [phone]   F: [phone]    Physical Therapy Daily Treatment Note      Date:  2025  Patient Name:  Suzan Garcia    :  1965  MRN: [id number]  Physician: Rita Marquez MD                             Insurance: [facility] shared services, 12 visits + eval  Medical Diagnosis:   G61.0 (ICD-10-CM) - AIDP (acute inflammatory demyelinating polyneuropathy) ([facility])   G82.20 (ICD-10-CM) - Paraparesis ([facility])   Rehab Codes: R53.1, R26.2   Onset date: 24               Next 's appt.: 3/19/25  Visit Count:                                 Cancel/No Show: 0/0    Evangelista #[id number]    Subjective: Patient reporting to therapy without an AD.  Patient reporting she hasn't been using her AD all week.  Patient reporting she feels her balance continues to improve and understands she needs to move slowly.      Pain:  [] Yes  [x] No Location:  Pain Rating: (0-10 scale) denies/10  Pain altered Tx:  [] No  [] Yes  Action:  Comments:    Objective:  Modalities:   Precautions:  Exercises:   Exercise Reps/ Time Weight/ Level Completed  Today Comments   Standing       Slow marching 20x2 2#  No UE support; emphasis on slow   Letitia step overs 10x 2#  No UE support; emphasis on slow   Heel toe raises 10x 2# x  added- cues to keep knees extended   Tandem balance 2x 30\" 2#   added   Mini squat 10x 2# x  needs more educaton; provided chair for TC   STS on foam 10x2 green x  added EOB no hands   Steps ups fwd/lat 10x  x  added   [facility]     ADD    Seated       LAQ 10x2 3\", 2#  Added during rest d/t fatigue.   added to HEP only 1 set          Gait       With  ft      Without AD 80 ft 2#   trialled   Dynamic Balance       Slow marching 25ftx2 CGA-SBA   added   Weaving through cones 5 cones, 3 laps CGA-SBA   added   Tandem walking fwd retro 3 passes ea  x  added finger tip support

## 2025-02-27 ENCOUNTER — HOSPITAL ENCOUNTER (OUTPATIENT)
Dept: PHYSICAL THERAPY | Age: 60
Setting detail: THERAPIES SERIES
Discharge: HOME | End: 2025-02-27
Payer: COMMERCIAL

## 2025-02-27 PROCEDURE — 97116 GAIT TRAINING THERAPY: CPT

## 2025-02-27 PROCEDURE — 97112 NEUROMUSCULAR REEDUCATION: CPT

## 2025-02-27 NOTE — FLOWSHEET NOTE
3 laps CGA-SBA  2/19 added   Tandem walking fwd retro 3 passes ea   2/26 added finger tip support   Resisted side step 3 laps green  2/26 added   Other:        Specific Instructions for next treatment: LE strengthening, (hip and functional), dynamic gait, foot clearance and step length.      Assessment: [x] Progressing toward goals.  Began session with treadmill walking on Lite gait to improve gait pattern and speed to safely amb community distances.  Cues needed to decrease step length on LLE as she demos too long of step.  Cues also provided to improve heel strike rolling into toe off.  Patient reporting toe off is difficult as she lacks sensation in her toes.  Patient was able to maintain on beat with metronome about 60% of the time.  Added cone pick ups this date from 6in step to improve balance with picking up objects from the floor and turning/reaching out of HORTENSIA.  Cues needed for proper squat technique.  Also added squat combo with chest press to challenge balance and simulate lifting weighted objects.  Patient reports feeling fatigued a the end of session.      [] No change.     [] Other:     [x] Patient would continue to benefit from skilled physical therapy services in order to: improve functional mobility and decrease pain for patient to complete ADLs/IADLs with least amount of compensation or restriction.      LTG: (to be met in 12 treatments)  Patient to ambulate without AD with no LOB or UE low guard to improve safety with community ambulation.   Patient to report walking without AD >15' to improve functional mobility and completion of IADLs.   ? Strength: of B LE's to 4-4+/5 to improve function strength for transfers and improve dynamic balance to ease ambulation.   ? Function: with 5x STS score to < 20\" to improve functional strength for transfers and decrease fall risk.   Independent with Home Exercise Programs  Improve score on TUG to <15\" to normalize gait and decrease fall risk.         Patient

## 2025-03-04 ENCOUNTER — HOSPITAL ENCOUNTER (OUTPATIENT)
Dept: PHYSICAL THERAPY | Age: 60
Setting detail: THERAPIES SERIES
Discharge: HOME | End: 2025-03-04
Payer: COMMERCIAL

## 2025-03-04 PROCEDURE — 97530 THERAPEUTIC ACTIVITIES: CPT

## 2025-03-04 NOTE — PROGRESS NOTES
Status:     [x] Continue per initial plan of care.    [] Additional visits necessary.    [] Other:     Treatment Charges: Mins Units Time in/Out   []  Modalities         []  Ther Exercise         []  Manual Therapy         [x]  Ther Activities  30  2     []  Aquatics         []  Neuromuscular       [] Vasocompression       [] Gait Training       [] Dry needling        [] 1 or 2 muscles        [] 3 or more muscles         []  Other         Total Billable time 30 2       Time In:0802         Time Out: 0832        Electronically signed by: Abraham Herrera PT    If you have any questions or concerns, please don't hesitate to call.  Thank you for your referral.    Physician Signature:________________________________Date:__________________  By signing above or cosigning this note, I have reviewed this plan of care and certify a need for medically necessary rehabilitation services.     *PLEASE SIGN ABOVE AND FAX BACK ALL PAGES*

## 2025-03-06 ENCOUNTER — HOSPITAL ENCOUNTER (OUTPATIENT)
Dept: PHYSICAL THERAPY | Age: 60
Setting detail: THERAPIES SERIES
Discharge: HOME | End: 2025-03-06
Payer: COMMERCIAL

## 2025-03-06 PROCEDURE — 97112 NEUROMUSCULAR REEDUCATION: CPT

## 2025-03-06 NOTE — FLOWSHEET NOTE
[facility]   Outpatient Rehabilitation & Therapy  [address]  P: [phone]   F: [phone]    Physical Therapy Daily Treatment Note      Date:  3/6/2025  Patient Name:  Suzan Garcia    :  1965  MRN: [id number]  Physician: Rita Marquez MD                             Insurance: [facility] shared services, 12 visits + eval  Medical Diagnosis:   G61.0 (ICD-10-CM) - AIDP (acute inflammatory demyelinating polyneuropathy) ([facility])   G82.20 (ICD-10-CM) - Paraparesis ([facility])   Rehab Codes: R53.1, R26.2   Onset date: 24               Next 's appt.: 3/19/25  Visit Count:                                 Cancel/No Show: 0/0    Stephanie #[id number]    Subjective: Patient reporting 4 square was difficult last session.  Patient reporting she is more confident with squatting down to  objects.  Patient denying falls or LOB since last visit.      Pain:  [] Yes  [x] No Location:  Pain Rating: (0-10 scale) denies/10  Pain altered Tx:  [] No  [] Yes  Action:  Comments:    Objective:  Modalities:   Precautions:  Exercises:   Exercise Reps/ Time Weight/ Level Completed  Today Comments   Standing       Slow marching 20x2 2#  No UE support; emphasis on slow   Letitia step overs 10x 2#  No UE support; emphasis on slow   Heel toe raises 10x 2#   added- cues to keep knees extended   Tandem balance 2x 30\" 2#   added   Mini squat 10x 2#   needs more educaton; provided chair for TC   STS on foam 10x2 green   added EOB no hands   Steps ups fwd/lat 10x2 6in x 3/6 inc sets this session.   Cone  PNF with and without foam 8 cone 2x ea direction From floor x 3/6 standing on foam added  cones from the floor.     Squat, chest press combo 10x2 4lb ball x 3/6 inc weight and sets-- cues to prevent genu valgus at knees     4 square step 6x ea direction  x 3/6 added-- timed 12.53s   Seated       LAQ 10x2 3\", 2#  Added during rest d/t fatigue.   added to HEP only 1 set

## 2025-03-11 ENCOUNTER — HOSPITAL ENCOUNTER (OUTPATIENT)
Dept: PHYSICAL THERAPY | Age: 60
Setting detail: THERAPIES SERIES
Discharge: HOME | End: 2025-03-11
Payer: COMMERCIAL

## 2025-03-11 PROCEDURE — 97110 THERAPEUTIC EXERCISES: CPT

## 2025-03-11 PROCEDURE — 97530 THERAPEUTIC ACTIVITIES: CPT

## 2025-03-11 PROCEDURE — 97112 NEUROMUSCULAR REEDUCATION: CPT

## 2025-03-11 NOTE — FLOWSHEET NOTE
-   [facility]   Outpatient Rehabilitation & Therapy  [address]  P: [phone]   F: [phone]    Physical Therapy Daily Treatment Note      Date:  3/11/2025  Patient Name:  Suzan Garcia    :  1965  MRN: [id number]  Physician: Rita Marquez MD                             Insurance: [facility] shared services, 12 visits + eval  Medical Diagnosis:   G61.0 (ICD-10-CM) - AIDP (acute inflammatory demyelinating polyneuropathy) ([facility])   G82.20 (ICD-10-CM) - Paraparesis ([facility])   Rehab Codes: R53.1, R26.2   Onset date: 24               Next 's appt.: 3/19/25  Visit Count:                                 Cancel/No Show: 0/0    Sayvert #[id number]    Subjective: Patient reporting inc difficulty with getting on hands and knees and returning to standing.    Pain:  [] Yes  [x] No Location:  Pain Rating: (0-10 scale) denies/10  Pain altered Tx:  [] No  [] Yes  Action:  Comments:    Objective:  Modalities:   Precautions:  Exercises:   Exercise Reps/ Time Weight/ Level Completed  Today Comments   Floor/Mat    3/11 added   Tall kneeling hip hinge 10x  x    Tall kneeling to half kneeling 10x ea  x    Tall kneeling around the world 10x ea  x    Half kneeling chest press 10x ea  x    Gastroc stretch 2x 30\"  x    Hamstring stretch 2x 30\"  x    Piriformis stretch 2x30\"  x                         Standing       Slow marching 20x2 2#  No UE support; emphasis on slow   Letitia step overs 10x 2#  No UE support; emphasis on slow   Heel toe raises 10x 2#   added- cues to keep knees extended   Tandem balance 2x 30\" 2#   added   Mini squat 10x 2#   needs more educaton; provided chair for TC   STS on foam 10x2 green   added EOB no hands   Steps ups fwd/lat 10x2 6in  3/6 inc sets this session.   Cone  PNF with and without foam 8 cone 2x ea direction From floor  3/6 standing on foam added  cones from the floor.     Squat, chest press combo 10x2 4lb ball  3/6 inc weight and

## 2025-03-13 ENCOUNTER — HOSPITAL ENCOUNTER (OUTPATIENT)
Dept: PHYSICAL THERAPY | Age: 60
Setting detail: THERAPIES SERIES
Discharge: HOME | End: 2025-03-13
Payer: COMMERCIAL

## 2025-03-13 ENCOUNTER — HOSPITAL ENCOUNTER (OUTPATIENT)
Age: 60
Setting detail: SPECIMEN
Discharge: HOME | End: 2025-03-13

## 2025-03-13 ENCOUNTER — APPOINTMENT (OUTPATIENT)
Dept: PHYSICAL THERAPY | Age: 60
End: 2025-03-13

## 2025-03-13 ENCOUNTER — OFFICE VISIT (OUTPATIENT)
Dept: FAMILY MEDICINE CLINIC | Age: 60
End: 2025-03-13

## 2025-03-13 VITALS
DIASTOLIC BLOOD PRESSURE: 76 MMHG | BODY MASS INDEX: 27.34 KG/M2 | HEART RATE: 58 BPM | WEIGHT: 144.8 LBS | SYSTOLIC BLOOD PRESSURE: 137 MMHG | HEIGHT: 61 IN

## 2025-03-13 DIAGNOSIS — G82.20 PARAPARESIS (HCC): Primary | ICD-10-CM

## 2025-03-13 DIAGNOSIS — G61.0 AIDP (ACUTE INFLAMMATORY DEMYELINATING POLYNEUROPATHY): ICD-10-CM

## 2025-03-13 DIAGNOSIS — D64.9 ANEMIA, UNSPECIFIED TYPE: ICD-10-CM

## 2025-03-13 DIAGNOSIS — R12 HEARTBURN: ICD-10-CM

## 2025-03-13 LAB
FERRITIN SERPL-MCNC: 64 NG/ML
IRON SATN MFR SERPL: 15 % (ref 20–55)
IRON SERPL-MCNC: 42 UG/DL (ref 37–145)
TIBC SERPL-MCNC: 272 UG/DL (ref 250–450)
UNSATURATED IRON BINDING CAPACITY: 230 UG/DL (ref 112–347)

## 2025-03-13 PROCEDURE — 97110 THERAPEUTIC EXERCISES: CPT

## 2025-03-13 PROCEDURE — 97112 NEUROMUSCULAR REEDUCATION: CPT

## 2025-03-13 RX ORDER — FAMOTIDINE 20 MG/1
20 TABLET, FILM COATED ORAL 2 TIMES DAILY PRN
Qty: 60 TABLET | Refills: 0 | Status: SHIPPED | OUTPATIENT
Start: 2025-03-13

## 2025-03-13 RX ORDER — TRAZODONE HYDROCHLORIDE 50 MG/1
25 TABLET ORAL NIGHTLY
Qty: 15 TABLET | Refills: 3 | Status: SHIPPED | OUTPATIENT
Start: 2025-03-13

## 2025-03-13 RX ORDER — GABAPENTIN 300 MG/1
300 CAPSULE ORAL 3 TIMES DAILY
Qty: 270 CAPSULE | Refills: 0 | Status: SHIPPED | OUTPATIENT
Start: 2025-03-13 | End: 2025-06-11

## 2025-03-13 ASSESSMENT — ENCOUNTER SYMPTOMS
DIARRHEA: 0
CONSTIPATION: 0
SHORTNESS OF BREATH: 0
ABDOMINAL PAIN: 0
BACK PAIN: 0
CHEST TIGHTNESS: 0
ABDOMINAL DISTENTION: 0
VOMITING: 0
NAUSEA: 0
RHINORRHEA: 0

## 2025-03-13 ASSESSMENT — PATIENT HEALTH QUESTIONNAIRE - PHQ9
SUM OF ALL RESPONSES TO PHQ QUESTIONS 1-9: 0
SUM OF ALL RESPONSES TO PHQ QUESTIONS 1-9: 0
5. POOR APPETITE OR OVEREATING: NOT AT ALL
1. LITTLE INTEREST OR PLEASURE IN DOING THINGS: NOT AT ALL
10. IF YOU CHECKED OFF ANY PROBLEMS, HOW DIFFICULT HAVE THESE PROBLEMS MADE IT FOR YOU TO DO YOUR WORK, TAKE CARE OF THINGS AT HOME, OR GET ALONG WITH OTHER PEOPLE: NOT DIFFICULT AT ALL
7. TROUBLE CONCENTRATING ON THINGS, SUCH AS READING THE NEWSPAPER OR WATCHING TELEVISION: NOT AT ALL
8. MOVING OR SPEAKING SO SLOWLY THAT OTHER PEOPLE COULD HAVE NOTICED. OR THE OPPOSITE, BEING SO FIGETY OR RESTLESS THAT YOU HAVE BEEN MOVING AROUND A LOT MORE THAN USUAL: NOT AT ALL
2. FEELING DOWN, DEPRESSED OR HOPELESS: NOT AT ALL
4. FEELING TIRED OR HAVING LITTLE ENERGY: NOT AT ALL
6. FEELING BAD ABOUT YOURSELF - OR THAT YOU ARE A FAILURE OR HAVE LET YOURSELF OR YOUR FAMILY DOWN: NOT AT ALL
SUM OF ALL RESPONSES TO PHQ QUESTIONS 1-9: 0
3. TROUBLE FALLING OR STAYING ASLEEP: NOT AT ALL
SUM OF ALL RESPONSES TO PHQ QUESTIONS 1-9: 0
9. THOUGHTS THAT YOU WOULD BE BETTER OFF DEAD, OR OF HURTING YOURSELF: NOT AT ALL

## 2025-03-13 NOTE — PROGRESS NOTES
Subjective:    Suzan Garcia is a 60 y.o. female with  has a past medical history of Chronic back pain.    Presented to the office today for:  Chief Complaint   Patient presents with    Numbness     Numbness on both feet and toes    Discuss Medications     Patient has some concerns which medications to take and which ones not to take     Headache     Patient has questions, she is wanting to know whether or not she is to still see neurology        Neuropathy      Headache    Patient presented today for follow-up on paraparesis and neuropathy, patient is doing significantly better, not currently using any any assistive device for walking, remains to have 4 sessions of physical therapy, does endorse numbness and tingling sometimes of the lower extremities along with the upper extremities that becomes less frequent.       Noted to have low hemoglobin levels and labs review, B12 and folic acid levels were recently measured and were normal.    Patient does endorse feeling fullness early in the morning, and eating does not help much to take that feeling away.  Denies any abdominal pain, diarrhea or constipation, nausea or vomiting.  Not related to specific food.    Review of Systems   Constitutional:  Negative for chills and fever.   HENT:  Negative for congestion and rhinorrhea.    Eyes:  Negative for visual disturbance.   Respiratory:  Negative for chest tightness and shortness of breath.    Cardiovascular:  Negative for chest pain, palpitations and leg swelling.   Gastrointestinal:  Negative for abdominal distention, abdominal pain, constipation, diarrhea, nausea and vomiting.   Genitourinary:  Negative for flank pain and frequency.   Musculoskeletal:  Negative for back pain.   Neurological:  Positive for numbness and headaches. Negative for weakness.   Psychiatric/Behavioral:  Negative for agitation and behavioral problems. The patient is not nervous/anxious.          The patient has a   Family History   Problem

## 2025-03-13 NOTE — FLOWSHEET NOTE
-   [facility]   Outpatient Rehabilitation & Therapy  [address]  P: [phone]   F: [phone]    Physical Therapy Daily Treatment Note      Date:  3/13/2025  Patient Name:  Suzan Garcia    :  1965  MRN: [id number]  Physician: Rita Marquez MD                             Insurance: [facility] shared services, 12 visits + eval  Medical Diagnosis:   G61.0 (ICD-10-CM) - AIDP (acute inflammatory demyelinating polyneuropathy) ([facility])   G82.20 (ICD-10-CM) - Paraparesis ([facility])   Rehab Codes: R53.1, R26.2   Onset date: 24               Next 's appt.: 3/19/25  Visit Count:                                 Cancel/No Show: 0/0    Laurel #[id number]  Ambrocio #[id number]    Subjective: Patient reporting she has been working on getting on and off the floor and feels much better about it.  Patient reporting pain in the hamstring on the RLE.  Patient reporting discomfort with deep squat position.    Pain:  [] Yes  [x] No Location:  Pain Rating: (0-10 scale) denies/10  Pain altered Tx:  [] No  [] Yes  Action:  Comments:    Objective:  Modalities:   Precautions:  Exercises:   Exercise Reps/ Time Weight/ Level Completed  Today Comments   Floor/Mat       Tall kneeling hip hinge 10x  x    Tall kneeling to half kneeling 10x ea  x    Tall kneeling around the world 10x ea      Half kneeling chest press 10x ea      Gastroc stretch 2x 30\"  x    Hamstring stretch 2x 30\"  x    Piriformis stretch 2x30\"  x    Modified matthew stretch 3x 30\"  x    Bridging with TB 10x 5\" red x 3/13 added                 Standing       Slow marching 20x2 2#  No UE support; emphasis on slow   Letitia step overs 10x 2#  No UE support; emphasis on slow   Heel toe raises 10x 2#   added- cues to keep knees extended   Tandem balance 2x 30\" 2#   added   Mini squat 10x 2#   needs more educaton; provided chair for TC   STS on foam 10x2 green   added EOB no hands   Steps ups fwd/lat 10x2 6in  3/6 inc sets this session.

## 2025-03-13 NOTE — PROGRESS NOTES
ATTENDING NOTE    .sfg    Past Medical History:   Diagnosis Date    Chronic back pain        Vitals:    03/13/25 1050   BP: 137/76   Pulse: 58       Suzan was seen today for numbness, discuss medications and headache.    Diagnoses and all orders for this visit:    Paraparesis (HCC)  -     amitriptyline (ELAVIL) 25 MG tablet; Take 1 tablet by mouth nightly  -     tiZANidine (ZANAFLEX) 4 MG tablet; Take 1 tablet by mouth every 8 hours as needed (muscle spasm)  -     traZODone (DESYREL) 50 MG tablet; Take 0.5 tablets by mouth nightly  -     gabapentin (NEURONTIN) 300 MG capsule; Take 1 capsule by mouth 3 times daily for 90 days.    AIDP (acute inflammatory demyelinating polyneuropathy)  -     amitriptyline (ELAVIL) 25 MG tablet; Take 1 tablet by mouth nightly  -     tiZANidine (ZANAFLEX) 4 MG tablet; Take 1 tablet by mouth every 8 hours as needed (muscle spasm)  -     traZODone (DESYREL) 50 MG tablet; Take 0.5 tablets by mouth nightly  -     gabapentin (NEURONTIN) 300 MG capsule; Take 1 capsule by mouth 3 times daily for 90 days.    Anemia, unspecified type  -     Iron and TIBC; Future  -     Ferritin; Future    Heartburn  -     famotidine (PEPCID) 20 MG tablet; Take 1 tablet by mouth 2 times daily as needed (heartburn first thing in the morning and night before meal)

## 2025-03-13 NOTE — PATIENT INSTRUCTIONS
Thank you for letting us take care of you today. We hope all your questions were addressed. If a question was overlooked or something else comes to mind after you return home, please contact a member of your Care Team listed below.      Your Care Team at [facility] is Team #1  Anaya Rdz M.D. (Faculty)  Enriqueta Borges M.D. (Resident)  Ketty Renteria D.O. (Resident)  Jeffery Muhammad M.D. (Resident)  Nichelle Munguia M.D. (Resident)  Elidia Herrera, [facility]  Orestes Arnold, [facility]  Anjana Gonzalez, [facility]  Buffy Harvey, [facility]  Maddy Chin, [facility]  Leslie Beltran, [facility]  Romain Malone (LJ) Olive,   Kristin Aleman [facility] (Clinical Pharmacist)     Office phone number: [phone]    If you need to get in right away due to illness, please be advised we have \"Same Day\" appointments available Monday-Friday. Please call us at [phone] option #3 to schedule your \"Same Day\" appointment.

## 2025-03-13 NOTE — PROGRESS NOTES
Visit Information    Have you changed or started any medications since your last visit including any over-the-counter medicines, vitamins, or herbal medicines? no   Are you having any side effects from any of your medications? -  no  Have you stopped taking any of your medications? Is so, why? -  no    Have you seen any other physician or provider since your last visit? No  Have you had any other diagnostic tests since your last visit? No  Have you been seen in the emergency room and/or had an admission to a hospital since we last saw you? No  Have you had your routine dental cleaning in the past 6 months? no    Have you activated your [employer] account? If not, what are your barriers? Yes     Patient Care Team:  Enriqueta Borges MD as PCP - General (Family Medicine)  Hubert Doll MD as Consulting Physician (Obstetrics & Gynecology)    Medical History Review  Past Medical, Family, and Social History reviewed and does not contribute to the patient presenting condition    Health Maintenance   Topic Date Due    HIV screen  Never done    Hepatitis C screen  Never done    DTaP/Tdap/Td vaccine (1 - Tdap) Never done    Colorectal Cancer Screen  Never done    Shingles vaccine (1 of 2) Never done    Pneumococcal 50+ years Vaccine (1 of 1 - PCV) Never done    Flu vaccine (1) 08/01/2024    COVID-19 Vaccine (2 - 2024-25 season) 09/01/2024    Breast cancer screen  04/24/2025    A1C test (Diabetic or Prediabetic)  12/26/2025    Lipids  12/26/2025    Depression Monitoring  01/20/2026    Cervical cancer screen  03/27/2028    Respiratory Syncytial Virus (RSV) Pregnant or age 60 yrs+ (1 - 1-dose 75+ series) 02/12/2040    Hepatitis A vaccine  Aged Out    Hepatitis B vaccine  Aged Out    Hib vaccine  Aged Out    Polio vaccine  Aged Out    Meningococcal (ACWY) vaccine  Aged Out    Meningococcal B vaccine  Aged Out    Depression Screen  Discontinued

## 2025-03-18 ENCOUNTER — HOSPITAL ENCOUNTER (OUTPATIENT)
Dept: PHYSICAL THERAPY | Age: 60
Setting detail: THERAPIES SERIES
Discharge: HOME | End: 2025-03-18

## 2025-03-18 NOTE — FLOWSHEET NOTE
[facility]   Outpatient Rehabilitation & Therapy  [address]  P: [phone]   F: [phone]     Physical Therapy Cancel/No Show note    Date: 3/18/2025  Patient: Suzan Garcia  : 1965  MRN: [id number]    Visit Count:   Cancels/No Shows to date:     For today's appointment patient:    [x]  Cancelled    [] Rescheduled appointment    [] No-show     Reason given by patient:    []  Patient ill    []  Conflicting appointment    [] No transportation      [] Conflict with work    [] No reason given    [] Weather related    [] COVID-19    [x] Other:      Comments:  Issues with insurance and cx today's session until resolved.      [] Next appointment was confirmed    Electronically signed by: Caroline Ribera PTA

## 2025-03-19 ENCOUNTER — OFFICE VISIT (OUTPATIENT)
Dept: PHYSICAL MEDICINE AND REHAB | Age: 60
End: 2025-03-19

## 2025-03-19 VITALS
BODY MASS INDEX: 27.85 KG/M2 | SYSTOLIC BLOOD PRESSURE: 140 MMHG | WEIGHT: 145.6 LBS | DIASTOLIC BLOOD PRESSURE: 79 MMHG | HEART RATE: 79 BPM | TEMPERATURE: 98.6 F

## 2025-03-19 DIAGNOSIS — G82.20 PARAPARESIS (HCC): ICD-10-CM

## 2025-03-19 DIAGNOSIS — G61.0 AIDP (ACUTE INFLAMMATORY DEMYELINATING POLYNEUROPATHY): ICD-10-CM

## 2025-03-19 DIAGNOSIS — G89.29 CHRONIC PAIN OF RIGHT KNEE: Primary | ICD-10-CM

## 2025-03-19 DIAGNOSIS — M25.561 CHRONIC PAIN OF RIGHT KNEE: Primary | ICD-10-CM

## 2025-03-19 DIAGNOSIS — G47.9 SLEEP DISTURBANCE: ICD-10-CM

## 2025-03-19 PROCEDURE — 99214 OFFICE O/P EST MOD 30 MIN: CPT | Performed by: PHYSICAL MEDICINE & REHABILITATION

## 2025-03-19 RX ORDER — TRAZODONE HYDROCHLORIDE 50 MG/1
50 TABLET ORAL NIGHTLY
Qty: 30 TABLET | Refills: 2 | Status: SHIPPED | OUTPATIENT
Start: 2025-03-19

## 2025-03-19 NOTE — PROGRESS NOTES
[facility] PHYSICAL MEDICINE & REHABILITATION  [address] [address]  Dept: [phone]  Dept Fax: [phone]    Outpatient Followup Note    Suzan Garcia, 60 y.o., female, presents for follow up c/o of Neurologic Problem (Inpt rehab follow-up)  .     HPI:     History of Present Illness  The patient presents for evaluation of knee pain, numbness in the lower extremities, hair loss, and sleep disturbance. She is accompanied by her daughter. Visit completed with [facility] service [place] Code 92923 #[id number].    She was discharged from the acute rehab at [place] on 01/16/2025 where she was treated after hospital admission for treatment of AIDP with BLE weakness/paresthesia and neuropathic pain. She has since completed her physical therapy sessions. She is no longer requiring her walker and notes an overall improvement in her condition. However, she continues to experience numbness in her lower extremities and persistent knee pain, particularly during ambulation.  She has been diligent in performing the exercises prescribed by her therapist at home.     She describes the right knee pain as being located within the joint, akin to a pinching sensation, which is exacerbated by walking. She differentiates this pain from the numbness in her leg. She has not undergone any radiographic examination of her knee. She also reports occasional clicking, catching, and locking of her knee.     She continues to struggle with sleep despite taking medication. She reports difficulty falling asleep and maintaining sleep, often waking up after 2 to 3 hours and spending the rest of the night attempting to return to sleep. This issue was exacerbated during her hospital stay.    She also reports a sensation of robotic movement and a feeling of obstruction in her leg.          Past Medical History:   Diagnosis Date    Chronic back pain       Past

## 2025-04-15 DIAGNOSIS — G82.20 PARAPARESIS (HCC): ICD-10-CM

## 2025-04-15 DIAGNOSIS — G61.0 AIDP (ACUTE INFLAMMATORY DEMYELINATING POLYNEUROPATHY): ICD-10-CM

## 2025-04-15 NOTE — TELEPHONE ENCOUNTER
Last visit: 3.13.25  Last Med refill: 3.13.25  Does patient have enough medication for 72 hours: Yes    Next Visit Date:  Future Appointments   Date Time Provider Department Center   5/15/2025  9:15 AM Enriqueta Borges MD [facility]   7/8/2025  3:00 PM Oma Blanton MD Neuro [facility] Neurology -       Health Maintenance   Topic Date Due    HIV screen  Never done    Hepatitis C screen  Never done    DTaP/Tdap/Td vaccine (1 - Tdap) Never done    Colorectal Cancer Screen  Never done    Shingles vaccine (1 of 2) Never done    Pneumococcal 50+ years Vaccine (1 of 1 - PCV) Never done    COVID-19 Vaccine (2 - 2024-25 season) 09/01/2024    Breast cancer screen  04/24/2025    Flu vaccine (Season Ended) 08/01/2025    A1C test (Diabetic or Prediabetic)  12/26/2025    Lipids  12/26/2025    Depression Screen  03/13/2026    Cervical cancer screen  03/27/2028    Respiratory Syncytial Virus (RSV) Pregnant or age 60 yrs+ (1 - 1-dose 75+ series) 02/12/2040    Hepatitis A vaccine  Aged Out    Hepatitis B vaccine  Aged Out    Hib vaccine  Aged Out    Polio vaccine  Aged Out    Meningococcal (ACWY) vaccine  Aged Out    Meningococcal B vaccine  Aged Out    Depression Monitoring  Discontinued       Hemoglobin A1C (%)   Date Value   12/26/2024 5.7   01/24/2019 5.9             ( goal A1C is < 7)   No components found for: \"LABMICR\"  No components found for: \"LDLCHOLESTEROL\", \"LDLCALC\"    (goal LDL is <100)   AST (U/L)   Date Value   01/04/2025 41 (H)     ALT (U/L)   Date Value   01/04/2025 47 (H)     BUN (mg/dL)   Date Value   01/11/2025 12     BP Readings from Last 3 Encounters:   03/19/25 (!) 140/79   03/13/25 137/76   02/07/25 (!) 95/57          (goal 120/80)    All Future Testing planned in [facility]  Lab Frequency Next Occurrence               Patient Active Problem List:     ASCUS with positive high risk HPV cervical     Screening mammogram for breast cancer     Sleep disturbance     Cholelithiasis without cholangitis

## 2025-05-06 DIAGNOSIS — G82.20 PARAPARESIS (HCC): ICD-10-CM

## 2025-05-06 DIAGNOSIS — G61.0 AIDP (ACUTE INFLAMMATORY DEMYELINATING POLYNEUROPATHY): ICD-10-CM

## 2025-05-07 RX ORDER — TRAZODONE HYDROCHLORIDE 50 MG/1
50 TABLET ORAL NIGHTLY
Qty: 30 TABLET | Refills: 2 | OUTPATIENT
Start: 2025-05-07

## 2025-05-15 ENCOUNTER — OFFICE VISIT (OUTPATIENT)
Age: 60
End: 2025-05-15

## 2025-05-15 VITALS
TEMPERATURE: 97.2 F | WEIGHT: 150.2 LBS | OXYGEN SATURATION: 98 % | SYSTOLIC BLOOD PRESSURE: 142 MMHG | HEIGHT: 61 IN | BODY MASS INDEX: 28.36 KG/M2 | DIASTOLIC BLOOD PRESSURE: 86 MMHG | HEART RATE: 55 BPM

## 2025-05-15 DIAGNOSIS — I10 HYPERTENSION, UNSPECIFIED TYPE: Primary | ICD-10-CM

## 2025-05-15 DIAGNOSIS — G82.20 PARAPARESIS (HCC): ICD-10-CM

## 2025-05-15 DIAGNOSIS — G43.909 MIGRAINE WITHOUT STATUS MIGRAINOSUS, NOT INTRACTABLE, UNSPECIFIED MIGRAINE TYPE: ICD-10-CM

## 2025-05-15 DIAGNOSIS — G51.0 FACIAL PALSY: ICD-10-CM

## 2025-05-15 DIAGNOSIS — R09.82 POST-NASAL DRIP: ICD-10-CM

## 2025-05-15 DIAGNOSIS — G61.0 AIDP (ACUTE INFLAMMATORY DEMYELINATING POLYNEUROPATHY): ICD-10-CM

## 2025-05-15 DIAGNOSIS — H53.8 BLURRED VISION, RIGHT EYE: ICD-10-CM

## 2025-05-15 DIAGNOSIS — M54.50 LOW BACK PAIN POTENTIALLY ASSOCIATED WITH RADICULOPATHY: ICD-10-CM

## 2025-05-15 PROCEDURE — 3078F DIAST BP <80 MM HG: CPT

## 2025-05-15 PROCEDURE — 99213 OFFICE O/P EST LOW 20 MIN: CPT

## 2025-05-15 PROCEDURE — 3077F SYST BP >= 140 MM HG: CPT

## 2025-05-15 PROCEDURE — 99212 OFFICE O/P EST SF 10 MIN: CPT

## 2025-05-15 RX ORDER — CARBOXYMETHYLCELLULOSE SODIUM 5 MG/ML
1 SOLUTION/ DROPS OPHTHALMIC 4 TIMES DAILY
Qty: 15 ML | Refills: 0 | Status: SHIPPED | OUTPATIENT
Start: 2025-05-15

## 2025-05-15 RX ORDER — LISINOPRIL 20 MG/1
20 TABLET ORAL DAILY
Qty: 30 TABLET | Refills: 3 | Status: SHIPPED | OUTPATIENT
Start: 2025-05-15

## 2025-05-15 RX ORDER — MINERAL OIL AND WHITE PETROLATUM 150; 830 MG/G; MG/G
OINTMENT OPHTHALMIC
Qty: 1 EACH | Refills: 1 | Status: SHIPPED | OUTPATIENT
Start: 2025-05-15

## 2025-05-15 RX ORDER — GABAPENTIN 300 MG/1
300 CAPSULE ORAL 3 TIMES DAILY
Qty: 270 CAPSULE | Refills: 0 | Status: SHIPPED | OUTPATIENT
Start: 2025-05-15 | End: 2025-08-13

## 2025-05-15 RX ORDER — FLUTICASONE PROPIONATE 50 MCG
1 SPRAY, SUSPENSION (ML) NASAL DAILY
Qty: 1 EACH | Refills: 0 | Status: SHIPPED | OUTPATIENT
Start: 2025-05-15 | End: 2025-05-20

## 2025-05-15 RX ORDER — BUTALBITAL, ACETAMINOPHEN AND CAFFEINE 300; 40; 50 MG/1; MG/1; MG/1
1 CAPSULE ORAL EVERY 6 HOURS PRN
Qty: 28 CAPSULE | Refills: 0 | Status: SHIPPED | OUTPATIENT
Start: 2025-05-15 | End: 2025-05-22

## 2025-05-15 RX ORDER — TRAZODONE HYDROCHLORIDE 50 MG/1
50 TABLET ORAL NIGHTLY
Qty: 30 TABLET | Refills: 2 | Status: SHIPPED | OUTPATIENT
Start: 2025-05-15

## 2025-05-15 RX ORDER — ROSUVASTATIN CALCIUM 40 MG/1
40 TABLET, COATED ORAL NIGHTLY
Qty: 30 TABLET | Refills: 3 | Status: SHIPPED | OUTPATIENT
Start: 2025-05-15

## 2025-05-15 ASSESSMENT — PATIENT HEALTH QUESTIONNAIRE - PHQ9
2. FEELING DOWN, DEPRESSED OR HOPELESS: NOT AT ALL
SUM OF ALL RESPONSES TO PHQ QUESTIONS 1-9: 0
SUM OF ALL RESPONSES TO PHQ QUESTIONS 1-9: 0
1. LITTLE INTEREST OR PLEASURE IN DOING THINGS: NOT AT ALL
SUM OF ALL RESPONSES TO PHQ QUESTIONS 1-9: 0
SUM OF ALL RESPONSES TO PHQ QUESTIONS 1-9: 0

## 2025-05-15 NOTE — PATIENT INSTRUCTIONS
Thank you for letting us take care of you today. We hope all your questions were addressed. If a question was overlooked or something else comes to mind after you return home, please contact a member of your Care Team listed below.      Your Care Team at [facility] is Team #1  Anaya Rdz M.D. (Faculty)  Enriqueta Borges M.D. (Resident)  Ketty Renteria D.O. (Resident)  Jeffery Muhammad M.D. (Resident)  Nichelle Munguia M.D. (Resident)  Elidia Herrera, [facility]  Orestes Arnold, [facility]  Anjana Gonzalez, [facility]  Buffy Harvey, [facility]  Maddy Chin, [facility]  Leslie Beltran, [facility]  Romain Malone (LJ) Olive,   Kristin Aleman [facility] (Clinical Pharmacist)     Office phone number: [phone]    If you need to get in right away due to illness, please be advised we have \"Same Day\" appointments available Monday-Friday. Please call us at [phone] option #3 to schedule your \"Same Day\" appointment.

## 2025-05-15 NOTE — PROGRESS NOTES
Session code [id number] ( )    Visit Information    Have you changed or started any medications since your last visit including any over-the-counter medicines, vitamins, or herbal medicines? no   Have you stopped taking any of your medications? Is so, why? -  no  Are you having any side effects from any of your medications? - no    Have you seen any other physician or provider since your last visit?  no   Have you had any other diagnostic tests since your last visit?  no   Have you been seen in the emergency room and/or had an admission in a hospital since we last saw you?  no   Have you had your routine dental cleaning in the past 6 months?  no     Do you have an active MyChart account? If no, what is the barrier?  Yes    Patient Care Team:  Enriqueta Borges MD as PCP - General (Family Medicine)  Hubert Doll MD as Consulting Physician (Obstetrics & Gynecology)    Medical History Review  Past Medical, Family, and Social History reviewed and does not contribute to the patient presenting condition    Health Maintenance   Topic Date Due    HIV screen  Never done    Hepatitis C screen  Never done    DTaP/Tdap/Td vaccine (1 - Tdap) Never done    Colorectal Cancer Screen  Never done    Shingles vaccine (1 of 2) Never done    Pneumococcal 50+ years Vaccine (1 of 1 - PCV) Never done    COVID-19 Vaccine (2 - 2024-25 season) 09/01/2024    Breast cancer screen  04/24/2025    Flu vaccine (Season Ended) 08/01/2025    A1C test (Diabetic or Prediabetic)  12/26/2025    Lipids  12/26/2025    Depression Screen  03/13/2026    Cervical cancer screen  03/27/2028    Respiratory Syncytial Virus (RSV) Pregnant or age 60 yrs+ (1 - 1-dose 75+ series) 02/12/2040    Hepatitis A vaccine  Aged Out    Hepatitis B vaccine  Aged Out    Hib vaccine  Aged Out    Polio vaccine  Aged Out    Meningococcal (ACWY) vaccine  Aged Out    Meningococcal B vaccine  Aged Out    Depression Monitoring  Discontinued

## 2025-05-15 NOTE — PROGRESS NOTES
Subjective:    Suzan Garcia is a 60 y.o. female with  has a past medical history of Chronic back pain.    Presented to the office today for:  Chief Complaint   Patient presents with    Hypertension    Pain     Leg and knee        Hypertension  Pertinent negatives include no chest pain, headaches, palpitations or shortness of breath.   Pain  Associated symptoms include arthralgias. Pertinent negatives include no abdominal pain, chest pain, chills, congestion, fever, headaches, joint swelling or weakness.   This visit was completed with the presence of patient, patient's daughter and an .    Patient presented today for follow-up, complaining of right leg pain, radiates from lower back all the way down  No weakness but does have decreased sensation at the area, pain is minimal on walking but sometimes affects her daily activities after being active all day and becomes more painful  States that gabapentin has been helping but has been taking twice a day only not like prescribed.  Pain has been causing her to have less sleep.  No recent trauma, heart disease has been gradual ever since she got admitted to the hospital.      Patient is also endorsing right eye blurry vision that has been worsening ever since admission, patient to follow-up with neurology in 2 months, she does endorse teary eyes sometimes, blurry vision is constant associated with inability to assess color sometimes.    Blood pressure was noted to be elevated, lisinopril was decreased to 10 mg previously, will resume lisinopril 20, denies any headache at this point.      Review of Systems   Constitutional:  Negative for chills and fever.   HENT:  Negative for congestion and rhinorrhea.    Eyes:  Positive for visual disturbance.   Respiratory:  Negative for chest tightness and shortness of breath.    Cardiovascular:  Negative for chest pain, palpitations and leg swelling.   Gastrointestinal:  Negative for abdominal pain.   Genitourinary:

## 2025-05-20 NOTE — PROGRESS NOTES
ATTENDING NOTE    Attending Physician Statement  I have discussed the care of Weroaciosincluding pertinent history and exam findings,  with the resident. I have reviewed the key elements of all parts of the encounter with the resident.  I agree with the assessment, plan and orders as documented by the resident.  (GE Modifier)    Past Medical History:   Diagnosis Date    Chronic back pain        Vitals:    05/15/25 0946   BP: (!) 142/86   Pulse:    Temp:    SpO2:        Suzan was seen today for hypertension and pain.    Diagnoses and all orders for this visit:    Hypertension, unspecified type  -     lisinopril (PRINIVIL;ZESTRIL) 20 MG tablet; Take 1 tablet by mouth daily  -     rosuvastatin (CRESTOR) 40 MG tablet; Take 1 tablet by mouth nightly    Migraine without status migrainosus, not intractable, unspecified migraine type  -     butalbital-APAP-caffeine -40 MG CAPS per capsule; Take 1 capsule by mouth every 6 hours as needed for Headaches    Facial palsy  -     carboxymethylcellulose (REFRESH PLUS) 0.5 % SOLN ophthalmic solution; Place 1 drop into the left eye 4 times daily  -     lubrifresh P.M. (ARTIFICIAL TEARS) ophthalmic ointment; Place into both eyes every hour as needed (dry eyes)    Post-nasal drip  -     fluticasone (FLONASE) 50 MCG/ACT nasal spray; 1 spray by Each Nostril route daily for 5 days    Paraparesis (HCC)  -     amitriptyline (ELAVIL) 25 MG tablet; Take 1 tablet by mouth nightly  -     gabapentin (NEURONTIN) 300 MG capsule; Take 1 capsule by mouth 3 times daily for 90 days.  -     tiZANidine (ZANAFLEX) 4 MG tablet; Take 1 tablet by mouth every 8 hours as needed (muscle spasm)  -     rosuvastatin (CRESTOR) 40 MG tablet; Take 1 tablet by mouth nightly  -     traZODone (DESYREL) 50 MG tablet; Take 1 tablet by mouth nightly  -     Darrion Shaw DO Pain [facility], [place]    AIDP (acute inflammatory demyelinating polyneuropathy)  -     amitriptyline (ELAVIL) 25 MG

## 2025-06-12 ENCOUNTER — HOSPITAL ENCOUNTER (OUTPATIENT)
Age: 60
Setting detail: SPECIMEN
Discharge: HOME | End: 2025-06-12

## 2025-06-12 ENCOUNTER — OFFICE VISIT (OUTPATIENT)
Age: 60
End: 2025-06-12

## 2025-06-12 VITALS
WEIGHT: 154.8 LBS | BODY MASS INDEX: 29.23 KG/M2 | HEIGHT: 61 IN | SYSTOLIC BLOOD PRESSURE: 160 MMHG | DIASTOLIC BLOOD PRESSURE: 90 MMHG

## 2025-06-12 DIAGNOSIS — D64.9 ANEMIA, UNSPECIFIED TYPE: ICD-10-CM

## 2025-06-12 DIAGNOSIS — I10 HYPERTENSION, UNSPECIFIED TYPE: Primary | ICD-10-CM

## 2025-06-12 DIAGNOSIS — G82.20 PARAPARESIS (HCC): ICD-10-CM

## 2025-06-12 DIAGNOSIS — I10 HYPERTENSION, UNSPECIFIED TYPE: ICD-10-CM

## 2025-06-12 DIAGNOSIS — R53.83 OTHER FATIGUE: ICD-10-CM

## 2025-06-12 DIAGNOSIS — G61.0 AIDP (ACUTE INFLAMMATORY DEMYELINATING POLYNEUROPATHY): ICD-10-CM

## 2025-06-12 DIAGNOSIS — R73.03 PREDIABETES: ICD-10-CM

## 2025-06-12 LAB
EST. AVERAGE GLUCOSE BLD GHB EST-MCNC: 123 MG/DL
HBA1C MFR BLD: 5.9 % (ref 4–6)
TSH SERPL DL<=0.05 MIU/L-ACNC: 2.2 UIU/ML (ref 0.27–4.2)

## 2025-06-12 PROCEDURE — 99212 OFFICE O/P EST SF 10 MIN: CPT

## 2025-06-12 PROCEDURE — 3077F SYST BP >= 140 MM HG: CPT

## 2025-06-12 PROCEDURE — 3080F DIAST BP >= 90 MM HG: CPT

## 2025-06-12 PROCEDURE — 99213 OFFICE O/P EST LOW 20 MIN: CPT

## 2025-06-12 RX ORDER — LISINOPRIL 20 MG/1
20 TABLET ORAL DAILY
Qty: 90 TABLET | Refills: 3 | Status: SHIPPED | OUTPATIENT
Start: 2025-06-12

## 2025-06-12 RX ORDER — ROSUVASTATIN CALCIUM 40 MG/1
40 TABLET, COATED ORAL NIGHTLY
Qty: 90 TABLET | Refills: 3 | Status: SHIPPED | OUTPATIENT
Start: 2025-06-12

## 2025-06-12 RX ORDER — FERROUS SULFATE 325(65) MG
325 TABLET ORAL
Qty: 45 TABLET | Refills: 1 | Status: SHIPPED | OUTPATIENT
Start: 2025-06-12

## 2025-06-12 RX ORDER — TRAZODONE HYDROCHLORIDE 50 MG/1
50 TABLET ORAL NIGHTLY
Qty: 30 TABLET | Refills: 0 | Status: SHIPPED | OUTPATIENT
Start: 2025-06-12

## 2025-06-12 NOTE — PATIENT INSTRUCTIONS
Thank you for letting us take care of you today. We hope all your questions were addressed. If a question was overlooked or something else comes to mind after you return home, please contact a member of your Care Team listed below.      Your Care Team at [facility] is Team #1  Anaya Rdz M.D. (Faculty)  Enriqueta Borges M.D. (Resident)  Ketty Renteria D.O. (Resident)  Jeffery Muhammad M.D. (Resident)  Nichelle Munguia M.D. (Resident)  Elidia Herrera, [facility]  Orestes Arnold, [facility]  Anjana Gonzalez, [facility]  Buffy Harvey, [facility]  Maddy Chin, [facility]  Leslie Beltran, [facility]  Romain Malone (LJ) Olive,   Kristin Aleman [facility] (Clinical Pharmacist)     Office phone number: [phone]    If you need to get in right away due to illness, please be advised we have \"Same Day\" appointments available Monday-Friday. Please call us at [phone] option #3 to schedule your \"Same Day\" appointment.

## 2025-06-12 NOTE — CONSULTS
Session ID: [id number]  Session Duration: Longer than 54 minutes  Language: Kyrgyz   ID: #[id number]   Name: Claire

## 2025-06-19 ENCOUNTER — OFFICE VISIT (OUTPATIENT)
Age: 60
End: 2025-06-19

## 2025-06-19 VITALS — DIASTOLIC BLOOD PRESSURE: 90 MMHG | BODY MASS INDEX: 29.61 KG/M2 | HEIGHT: 61 IN | SYSTOLIC BLOOD PRESSURE: 158 MMHG

## 2025-06-19 DIAGNOSIS — G61.0 AIDP (ACUTE INFLAMMATORY DEMYELINATING POLYNEUROPATHY): ICD-10-CM

## 2025-06-19 DIAGNOSIS — I10 PRIMARY HYPERTENSION: Primary | ICD-10-CM

## 2025-06-19 PROCEDURE — 99212 OFFICE O/P EST SF 10 MIN: CPT

## 2025-06-19 RX ORDER — HYDROCHLOROTHIAZIDE 12.5 MG/1
12.5 CAPSULE ORAL EVERY MORNING
Qty: 90 CAPSULE | Refills: 5 | Status: SHIPPED | OUTPATIENT
Start: 2025-06-19

## 2025-06-19 ASSESSMENT — ENCOUNTER SYMPTOMS
BACK PAIN: 0
SHORTNESS OF BREATH: 0
CHEST TIGHTNESS: 0
RHINORRHEA: 0
ABDOMINAL PAIN: 0

## 2025-06-19 NOTE — PATIENT INSTRUCTIONS
Thank you for letting us take care of you today. We hope all your questions were addressed. If a question was overlooked or something else comes to mind after you return home, please contact a member of your Care Team listed below.      Your Care Team at [facility] is Team #1  Anaya Rdz M.D. (Faculty)  Enriqueta Borges M.D. (Resident)  Ketty Renteria D.O. (Resident)  Jeffery Muhammad M.D. (Resident)  Nichelle Munguia M.D. (Resident)  Elidia Herrera, [facility]  Orestes Arnold, [facility]  Anjana Gonzalez, [facility]  Buffy Harvey, [facility]  Maddy Chin, [facility]  Leslie Beltran, [facility]  Romain Malone (LJ) Olive,   Kristin Aleman [facility] (Clinical Pharmacist)     Office phone number: [phone]    If you need to get in right away due to illness, please be advised we have \"Same Day\" appointments available Monday-Friday. Please call us at [phone] option #3 to schedule your \"Same Day\" appointment.

## 2025-06-19 NOTE — PROGRESS NOTES
Attending Physician Statement  I have discussed the care of Suzan Garcia, 60 y.o. female,including pertinent history and exam findings,  with the resident Enriqueta Pitt MD.  History:  Chief Complaint   Patient presents with    Hypertension     1 week follow up       I have reviewed the key elements of the encounter with the resident. Examination was done by resident as documented in residents note.    BP Readings from Last 3 Encounters:   06/19/25 (!) 158/90   06/12/25 (!) 160/90   05/15/25 (!) 142/86     BP (!) 158/90 (BP Site: Right Upper Arm, Patient Position: Sitting, BP Cuff Size: Medium Adult)   Ht 1.54 m (5' 0.63\")   LMP 10/13/2015 (Approximate) Comment: possible menopause  BMI 29.61 kg/m²   Lab Results   Component Value Date    WBC 7.8 01/11/2025    HGB 11.9 (L) 01/11/2025    HCT 34.0 (L) 01/11/2025     01/11/2025    CHOL 148 12/26/2024    TRIG 188 (H) 12/26/2024    HDL 20 (L) 12/26/2024    ALT 47 (H) 01/04/2025    AST 41 (H) 01/04/2025     01/11/2025    K 4.1 01/11/2025     01/11/2025    CREATININE 0.5 (L) 01/11/2025    BUN 12 01/11/2025    CO2 26 01/11/2025    TSH 2.20 06/12/2025    INR 1.1 12/26/2024    LABA1C 5.9 06/12/2025     Lab Results   Component Value Date    CALCIUM 9.0 01/11/2025     No results found for: \"LDLDIRECT\"  I agree with the assessment, plan and diagnosis of    Diagnosis Orders   1. Primary hypertension  hydroCHLOROthiazide 12.5 MG capsule      2. Paraparesis (HCC)        3. AIDP (acute inflammatory demyelinating polyneuropathy)          I agree with orders as documented by the resident.    Recommendations: Agree with resident assessment and plan.    Return in about 6 days (around 6/25/2025) for nurse visit  for htn .   (GE Modifier ) Dr. ANDREA BURKS MD

## 2025-06-19 NOTE — PROGRESS NOTES
Subjective:    Suzan Garcia is a 60 y.o. female with  has a past medical history of Chronic back pain.    Presented to the office today for:  Chief Complaint   Patient presents with    Hypertension     1 week follow up       Hypertension  Pertinent negatives include no chest pain, headaches, palpitations or shortness of breath.     Presented for follow-up on hypertension  Lisinopril increased to 20 mg last visit  Patient is asymptomatic  /90 today, slightly improved from last visit  Patient is agreeable to starting other medications  ED precautions provided    Review of Systems   Constitutional:  Negative for chills and fever.   HENT:  Negative for congestion and rhinorrhea.    Respiratory:  Negative for chest tightness and shortness of breath.    Cardiovascular:  Negative for chest pain, palpitations and leg swelling.   Gastrointestinal:  Negative for abdominal pain.   Genitourinary:  Negative for dysuria, flank pain and frequency.   Musculoskeletal:  Negative for back pain.   Neurological:  Negative for weakness and headaches.   Psychiatric/Behavioral:  Negative for agitation and behavioral problems. The patient is not nervous/anxious.          The patient has a   Family History   Problem Relation Age of Onset    Diabetes Mother        Objective:    BP (!) 158/90 (BP Site: Right Upper Arm, Patient Position: Sitting, BP Cuff Size: Medium Adult)   Ht 1.54 m (5' 0.63\")   LMP 10/13/2015 (Approximate) Comment: possible menopause  BMI 29.61 kg/m²    BP Readings from Last 3 Encounters:   06/19/25 (!) 158/90   06/12/25 (!) 160/90   05/15/25 (!) 142/86       Physical Exam  Constitutional:       Appearance: Normal appearance.   Cardiovascular:      Rate and Rhythm: Normal rate and regular rhythm.   Pulmonary:      Effort: Pulmonary effort is normal.      Breath sounds: Normal breath sounds.   Abdominal:      General: Abdomen is flat.      Palpations: Abdomen is soft.   Musculoskeletal:      Right lower leg:

## 2025-06-19 NOTE — PROGRESS NOTES
Visit Information    Have you changed or started any medications since your last visit including any over-the-counter medicines, vitamins, or herbal medicines? no   Have you stopped taking any of your medications? Is so, why? -  no  Are you having any side effects from any of your medications? - no    Have you seen any other physician or provider since your last visit?  no   Have you had any other diagnostic tests since your last visit?  no   Have you been seen in the emergency room and/or had an admission in a hospital since we last saw you?  no   Have you had your routine dental cleaning in the past 6 months?  no     Do you have an active MyChart account? If no, what is the barrier?  Yes    Patient Care Team:  Enriqueta Borges MD as PCP - General (Family Medicine)  Hubert Doll MD as Consulting Physician (Obstetrics & Gynecology)    Medical History Review  Past Medical, Family, and Social History reviewed and does contribute to the patient presenting condition    Health Maintenance   Topic Date Due    HIV screen  Never done    Hepatitis C screen  Never done    DTaP/Tdap/Td vaccine (1 - Tdap) Never done    Colorectal Cancer Screen  Never done    Shingles vaccine (1 of 2) Never done    Pneumococcal 50+ years Vaccine (1 of 1 - PCV) Never done    COVID-19 Vaccine (2 - 2024-25 season) 09/01/2024    Breast cancer screen  04/24/2025    Flu vaccine (Season Ended) 08/01/2025    Lipids  12/26/2025    Depression Screen  05/15/2026    A1C test (Diabetic or Prediabetic)  06/12/2026    Cervical cancer screen  03/27/2028    Respiratory Syncytial Virus (RSV) Pregnant or age 60 yrs+ (1 - 1-dose 75+ series) 02/12/2040    Hepatitis A vaccine  Aged Out    Hepatitis B vaccine  Aged Out    Hib vaccine  Aged Out    Polio vaccine  Aged Out    Meningococcal (ACWY) vaccine  Aged Out    Meningococcal B vaccine  Aged Out    Depression Monitoring  Discontinued

## 2025-07-14 NOTE — PROGRESS NOTES
Attending Physician Statement  I  have discussed the care of Suzan Garcia including pertinent history and exam findings with the resident. I agree with the assessment, plan and orders as documented by the resident.      BP (!) 160/90 (BP Site: Left Upper Arm, Patient Position: Sitting, BP Cuff Size: Medium Adult)   Ht 1.54 m (5' 0.63\")   Wt 70.2 kg (154 lb 12.8 oz)   LMP 10/13/2015 (Approximate) Comment: possible menopause  BMI 29.61 kg/m²    BP Readings from Last 3 Encounters:   06/19/25 (!) 158/90   06/12/25 (!) 160/90   05/15/25 (!) 142/86     Wt Readings from Last 3 Encounters:   06/12/25 70.2 kg (154 lb 12.8 oz)   05/15/25 68.1 kg (150 lb 3.2 oz)   03/19/25 66 kg (145 lb 9.6 oz)          Diagnosis Orders   1. Hypertension, unspecified type  TSH reflex to FT4    lisinopril (PRINIVIL;ZESTRIL) 20 MG tablet    rosuvastatin (CRESTOR) 40 MG tablet      2. Other fatigue  TSH reflex to FT4      3. Anemia, unspecified type  ferrous sulfate (IRON 325) 325 (65 Fe) MG tablet      4. Paraparesis (HCC)  amitriptyline (ELAVIL) 25 MG tablet    rosuvastatin (CRESTOR) 40 MG tablet    traZODone (DESYREL) 50 MG tablet      5. AIDP (acute inflammatory demyelinating polyneuropathy)  amitriptyline (ELAVIL) 25 MG tablet    traZODone (DESYREL) 50 MG tablet      6. Prediabetes  Hemoglobin A1C              Moe Bianchi MD 7/14/2025 10:10 AM      
Subjective:    Suzan Garcia is a 60 y.o. female with  has a past medical history of Chronic back pain.    Presented to the office today for:  Chief Complaint   Patient presents with    Hypertension     Needs clarification on medications - which ones should she keep taking. Possible allergy to rosuvastatin       Hypertension  Pertinent negatives include no chest pain, headaches, palpitations or shortness of breath.     Hypertension  Uncontrolled, 170/91 presented, repeat was 160/90, patient is asymptomatic  Still taking lisinopril 10 mg, denies any other concerns but would like her blood pressure to be better controlled      Patient has concerns regarding fatigue, excessive tiredness sometimes, history of anemia, denies any heat or cold intolerance, no polyuria polydipsia but did check her blood sugar fasting was 140, patient is concerned about her blood sugar    Long discussion regarding medications currently  Patient is encouraged to taper down and take trazodone only as needed and not every day  Patient is on amitriptyline as well  Patient is on gabapentin 300 3 times daily, instructed that in the near future we need to cut down slowly if patient thinks that she has been taking a lot of sedating medications, no concerns for falls currently  Patient is also encouraged to decrease the use of Zanaflex, patient was agreeable    Review of Systems   Constitutional:  Positive for fatigue.   Respiratory:  Negative for chest tightness and shortness of breath.    Cardiovascular:  Negative for chest pain, palpitations and leg swelling.   Gastrointestinal:  Negative for abdominal pain.   Genitourinary:  Negative for dysuria, flank pain and frequency.   Musculoskeletal:  Negative for back pain.   Neurological:  Negative for weakness and headaches.   Psychiatric/Behavioral:  Negative for agitation. The patient is not nervous/anxious.          The patient has a   Family History   Problem Relation Age of Onset    Diabetes 
Visit Information    Have you changed or started any medications since your last visit including any over-the-counter medicines, vitamins, or herbal medicines? no   Have you stopped taking any of your medications? Is so, why? -  no  Are you having any side effects from any of your medications? - no    Have you seen any other physician or provider since your last visit?  no   Have you had any other diagnostic tests since your last visit?  no   Have you been seen in the emergency room and/or had an admission in a hospital since we last saw you?  no   Have you had your routine dental cleaning in the past 6 months?  no     Do you have an active MyChart account? If no, what is the barrier?  Yes    Patient Care Team:  Enriqueta Borges MD as PCP - General (Family Medicine)  Hubert Doll MD as Consulting Physician (Obstetrics & Gynecology)    Medical History Review  Past Medical, Family, and Social History reviewed and does contribute to the patient presenting condition    Health Maintenance   Topic Date Due    HIV screen  Never done    Hepatitis C screen  Never done    DTaP/Tdap/Td vaccine (1 - Tdap) Never done    Colorectal Cancer Screen  Never done    Shingles vaccine (1 of 2) Never done    Pneumococcal 50+ years Vaccine (1 of 1 - PCV) Never done    COVID-19 Vaccine (2 - 2024-25 season) 09/01/2024    Breast cancer screen  04/24/2025    Flu vaccine (Season Ended) 08/01/2025    A1C test (Diabetic or Prediabetic)  12/26/2025    Lipids  12/26/2025    Depression Screen  05/15/2026    Cervical cancer screen  03/27/2028    Respiratory Syncytial Virus (RSV) Pregnant or age 60 yrs+ (1 - 1-dose 75+ series) 02/12/2040    Hepatitis A vaccine  Aged Out    Hepatitis B vaccine  Aged Out    Hib vaccine  Aged Out    Polio vaccine  Aged Out    Meningococcal (ACWY) vaccine  Aged Out    Meningococcal B vaccine  Aged Out    Depression Monitoring  Discontinued             
(0) independent